# Patient Record
Sex: MALE | Race: WHITE | NOT HISPANIC OR LATINO | Employment: FULL TIME | URBAN - METROPOLITAN AREA
[De-identification: names, ages, dates, MRNs, and addresses within clinical notes are randomized per-mention and may not be internally consistent; named-entity substitution may affect disease eponyms.]

---

## 2019-03-29 ENCOUNTER — MYC MEDICAL ADVICE (OUTPATIENT)
Dept: FAMILY MEDICINE | Facility: CLINIC | Age: 49
End: 2019-03-29

## 2019-04-26 ENCOUNTER — OFFICE VISIT (OUTPATIENT)
Dept: FAMILY MEDICINE | Facility: CLINIC | Age: 49
End: 2019-04-26
Payer: COMMERCIAL

## 2019-04-26 VITALS
BODY MASS INDEX: 34.78 KG/M2 | DIASTOLIC BLOOD PRESSURE: 82 MMHG | WEIGHT: 279.7 LBS | SYSTOLIC BLOOD PRESSURE: 124 MMHG | HEART RATE: 71 BPM | OXYGEN SATURATION: 97 % | HEIGHT: 75 IN | TEMPERATURE: 98.3 F

## 2019-04-26 DIAGNOSIS — K21.9 GASTROESOPHAGEAL REFLUX DISEASE WITHOUT ESOPHAGITIS: ICD-10-CM

## 2019-04-26 DIAGNOSIS — Z12.11 SCREEN FOR COLON CANCER: ICD-10-CM

## 2019-04-26 DIAGNOSIS — M51.26 LUMBAR HERNIATED DISC: ICD-10-CM

## 2019-04-26 DIAGNOSIS — D22.9 ATYPICAL MOLE: ICD-10-CM

## 2019-04-26 DIAGNOSIS — I10 ESSENTIAL HYPERTENSION: ICD-10-CM

## 2019-04-26 DIAGNOSIS — Z11.3 SCREEN FOR STD (SEXUALLY TRANSMITTED DISEASE): ICD-10-CM

## 2019-04-26 DIAGNOSIS — E78.2 MIXED HYPERLIPIDEMIA: ICD-10-CM

## 2019-04-26 DIAGNOSIS — G47.09 OTHER INSOMNIA: ICD-10-CM

## 2019-04-26 DIAGNOSIS — E55.9 VITAMIN D DEFICIENCY: ICD-10-CM

## 2019-04-26 DIAGNOSIS — Z00.00 ENCOUNTER FOR ROUTINE ADULT HEALTH EXAMINATION WITHOUT ABNORMAL FINDINGS: Primary | ICD-10-CM

## 2019-04-26 DIAGNOSIS — I82.90 CLOT: ICD-10-CM

## 2019-04-26 PROBLEM — R73.03 PREDIABETES: Status: ACTIVE | Noted: 2019-04-26

## 2019-04-26 PROBLEM — K80.20 CHOLELITHIASIS: Status: ACTIVE | Noted: 2019-04-26

## 2019-04-26 PROBLEM — K22.4 INEFFECTIVE ESOPHAGEAL MOTILITY: Status: ACTIVE | Noted: 2019-04-26

## 2019-04-26 LAB
ALBUMIN SERPL-MCNC: 4 G/DL (ref 3.4–5)
ALP SERPL-CCNC: 73 U/L (ref 40–150)
ALT SERPL W P-5'-P-CCNC: 73 U/L (ref 0–70)
ANION GAP SERPL CALCULATED.3IONS-SCNC: 7 MMOL/L (ref 3–14)
AST SERPL W P-5'-P-CCNC: 28 U/L (ref 0–45)
BILIRUB SERPL-MCNC: 0.3 MG/DL (ref 0.2–1.3)
BUN SERPL-MCNC: 11 MG/DL (ref 7–30)
CALCIUM SERPL-MCNC: 9.4 MG/DL (ref 8.5–10.1)
CHLORIDE SERPL-SCNC: 107 MMOL/L (ref 94–109)
CHOLEST SERPL-MCNC: 196 MG/DL
CO2 SERPL-SCNC: 26 MMOL/L (ref 20–32)
CREAT SERPL-MCNC: 0.78 MG/DL (ref 0.66–1.25)
DEPRECATED CALCIDIOL+CALCIFEROL SERPL-MC: 22 UG/L (ref 20–75)
GFR SERPL CREATININE-BSD FRML MDRD: >90 ML/MIN/{1.73_M2}
GLUCOSE SERPL-MCNC: 104 MG/DL (ref 70–99)
HBA1C MFR BLD: 6 % (ref 0–5.6)
HCV AB SERPL QL IA: NONREACTIVE
HDLC SERPL-MCNC: 38 MG/DL
HIV 1+2 AB+HIV1 P24 AG SERPL QL IA: NONREACTIVE
LDLC SERPL CALC-MCNC: 128 MG/DL
NONHDLC SERPL-MCNC: 158 MG/DL
POTASSIUM SERPL-SCNC: 4 MMOL/L (ref 3.4–5.3)
PROT SERPL-MCNC: 7.7 G/DL (ref 6.8–8.8)
PSA SERPL-ACNC: 2.48 UG/L (ref 0–4)
SODIUM SERPL-SCNC: 140 MMOL/L (ref 133–144)
T PALLIDUM AB SER QL: NONREACTIVE
TRIGL SERPL-MCNC: 150 MG/DL

## 2019-04-26 PROCEDURE — 80061 LIPID PANEL: CPT | Performed by: FAMILY MEDICINE

## 2019-04-26 PROCEDURE — G0103 PSA SCREENING: HCPCS | Performed by: FAMILY MEDICINE

## 2019-04-26 PROCEDURE — 0064U ANTB TP TOTAL&RPR IA QUAL: CPT | Performed by: FAMILY MEDICINE

## 2019-04-26 PROCEDURE — 86803 HEPATITIS C AB TEST: CPT | Performed by: FAMILY MEDICINE

## 2019-04-26 PROCEDURE — 80053 COMPREHEN METABOLIC PANEL: CPT | Performed by: FAMILY MEDICINE

## 2019-04-26 PROCEDURE — 99396 PREV VISIT EST AGE 40-64: CPT | Performed by: FAMILY MEDICINE

## 2019-04-26 PROCEDURE — 82306 VITAMIN D 25 HYDROXY: CPT | Performed by: FAMILY MEDICINE

## 2019-04-26 PROCEDURE — 36415 COLL VENOUS BLD VENIPUNCTURE: CPT | Performed by: FAMILY MEDICINE

## 2019-04-26 PROCEDURE — 87591 N.GONORRHOEAE DNA AMP PROB: CPT | Performed by: FAMILY MEDICINE

## 2019-04-26 PROCEDURE — 87389 HIV-1 AG W/HIV-1&-2 AB AG IA: CPT | Performed by: FAMILY MEDICINE

## 2019-04-26 PROCEDURE — 87491 CHLMYD TRACH DNA AMP PROBE: CPT | Performed by: FAMILY MEDICINE

## 2019-04-26 PROCEDURE — 83036 HEMOGLOBIN GLYCOSYLATED A1C: CPT | Performed by: FAMILY MEDICINE

## 2019-04-26 RX ORDER — AMLODIPINE BESYLATE 10 MG/1
10 TABLET ORAL DAILY
Qty: 90 TABLET | Refills: 3 | Status: SHIPPED | OUTPATIENT
Start: 2019-04-26 | End: 2020-03-10

## 2019-04-26 RX ORDER — LOSARTAN POTASSIUM 25 MG/1
25 TABLET ORAL DAILY
Qty: 90 TABLET | Refills: 3 | Status: SHIPPED | OUTPATIENT
Start: 2019-04-26 | End: 2020-05-19

## 2019-04-26 RX ORDER — ZOLPIDEM TARTRATE 10 MG/1
10 TABLET ORAL
COMMUNITY
Start: 2018-11-30 | End: 2020-05-27

## 2019-04-26 RX ORDER — CYCLOBENZAPRINE HCL 10 MG
10 TABLET ORAL 3 TIMES DAILY PRN
Qty: 90 TABLET | Refills: 0 | Status: SHIPPED | OUTPATIENT
Start: 2019-04-26 | End: 2019-09-25

## 2019-04-26 RX ORDER — ROSUVASTATIN CALCIUM 20 MG/1
20 TABLET, COATED ORAL DAILY
Qty: 90 TABLET | Refills: 3 | Status: SHIPPED | OUTPATIENT
Start: 2019-04-26 | End: 2020-03-10

## 2019-04-26 RX ORDER — NAPROXEN 500 MG/1
500 TABLET ORAL PRN
COMMUNITY
Start: 2018-06-08 | End: 2020-05-27

## 2019-04-26 RX ORDER — LOSARTAN POTASSIUM 25 MG/1
25 TABLET ORAL
COMMUNITY
Start: 2018-11-25 | End: 2019-04-26

## 2019-04-26 RX ORDER — ASPIRIN 325 MG
TABLET ORAL DAILY
COMMUNITY

## 2019-04-26 RX ORDER — CYCLOBENZAPRINE HCL 10 MG
10 TABLET ORAL
COMMUNITY
Start: 2018-06-08 | End: 2019-04-26

## 2019-04-26 RX ORDER — AMLODIPINE BESYLATE 10 MG/1
10 TABLET ORAL
COMMUNITY
Start: 2018-11-25 | End: 2019-04-26

## 2019-04-26 RX ORDER — PRAVASTATIN SODIUM 40 MG
TABLET ORAL
COMMUNITY
Start: 2018-11-25 | End: 2019-04-26

## 2019-04-26 SDOH — HEALTH STABILITY: MENTAL HEALTH: HOW OFTEN DO YOU HAVE A DRINK CONTAINING ALCOHOL?: 2-3 TIMES A WEEK

## 2019-04-26 ASSESSMENT — MIFFLIN-ST. JEOR: SCORE: 2219.34

## 2019-04-26 NOTE — NURSING NOTE
"Chief Complaint   Patient presents with     Physical     would like to get tested for \"everything\", pt is fasting     /82   Pulse 71   Temp 98.3  F (36.8  C) (Oral)   Ht 1.905 m (6' 3\")   Wt 126.9 kg (279 lb 11.2 oz)   SpO2 97%   BMI 34.96 kg/m   Estimated body mass index is 34.96 kg/m  as calculated from the following:    Height as of this encounter: 1.905 m (6' 3\").    Weight as of this encounter: 126.9 kg (279 lb 11.2 oz).        Health Maintenance due pending provider review:  NONE    n/a    Lashell Branch CMA  "

## 2019-04-26 NOTE — PROGRESS NOTES
SUBJECTIVE:   CC: Sinan Rodriguez is an 48 year old male who presents for preventative health visit.     Healthy Habits:     Getting at least 3 servings of Calcium per day:  NO    Bi-annual eye exam:  Yes    Dental care twice a year:  Yes    Sleep apnea or symptoms of sleep apnea:  None    Diet:  Regular (no restrictions)    Frequency of exercise:  2-3 days/week    Duration of exercise:  Other    Taking medications regularly:  Yes    Medication side effects:  None    PHQ-2 Total Score: 0    Additional concerns today:  No          -------------------------------------    Today's PHQ-2 Score:   PHQ-2 ( 1999 [employer]) 4/19/2019   Q1: Little interest or pleasure in doing things 0   Q2: Feeling down, depressed or hopeless 0   PHQ-2 Score 0   Q1: Little interest or pleasure in doing things Not at all   Q2: Feeling down, depressed or hopeless Not at all   PHQ-2 Score 0       Abuse: Current or Past(Physical, Sexual or Emotional)- NO  Do you feel safe in your environment? YES    Social History     Tobacco Use     Smoking status: Not on file   Substance Use Topics     Alcohol use: Not on file         Alcohol Use 4/19/2019   Prescreen: >3 drinks/day or >7 drinks/week? No   No flowsheet data found.    Last PSA: No results found for: PSA    Reviewed orders with patient. Reviewed health maintenance and updated orders accordingly - Yes  Patient Active Problem List   Diagnosis     Chondromalacia of patella     Knee pain     Low back pain     Essential hypertension     Clot     Mixed hyperlipidemia     Gastroesophageal reflux disease without esophagitis     Other insomnia     Lumbar herniated disc     Past Surgical History:   Procedure Laterality Date     KNEE SURGERY Bilateral     scope multiple - most recent 2014     STRIP VEIN BILATERAL Bilateral 2015       Social History     Tobacco Use     Smoking status: Current Some Day Smoker     Types: Cigars, Dip, chew, snus or snuff     Smokeless tobacco: Current User   Substance Use Topics      Alcohol use: Yes     Frequency: 2-3 times a week     Family History   Problem Relation Age of Onset     Cholecystitis Father 67        passed away - gangrene     Cancer Brother         arm - sarcoma in HS           Reviewed and updated as needed this visit by clinical staff         Reviewed and updated as needed this visit by Provider            Review of Systems  CONSTITUTIONAL: NEGATIVE for fever, chills, change in weight  INTEGUMENTARY/SKIN: NEGATIVE for worrisome rashes, moles or lesions  EYES: NEGATIVE for vision changes or irritation  ENT: NEGATIVE for ear, mouth and throat problems  RESP: NEGATIVE for significant cough or SOB  CV: NEGATIVE for chest pain, palpitations or peripheral edema  GI: NEGATIVE for nausea, abdominal pain, heartburn, or change in bowel habits   male: negative for dysuria, hematuria, decreased urinary stream, erectile dysfunction, urethral discharge  MUSCULOSKELETAL: NEGATIVE for significant arthralgias or myalgia  NEURO: NEGATIVE for weakness, dizziness or paresthesias  PSYCHIATRIC: NEGATIVE for changes in mood or affect    OBJECTIVE:   There were no vitals taken for this visit.    Physical Exam  GENERAL: alert, no distress and over weight  EYES: Eyes grossly normal to inspection, PERRL and conjunctivae and sclerae normal  HENT: ear canals and TM's normal, nose and mouth without ulcers or lesions  NECK: no adenopathy, no asymmetry, masses, or scars and thyroid normal to palpation  RESP: lungs clear to auscultation - no rales, rhonchi or wheezes  CV: regular rate and rhythm, normal S1 S2, no S3 or S4, no murmur, click or rub, no peripheral edema and peripheral pulses strong  ABDOMEN: soft, nontender, no hepatosplenomegaly, no masses and bowel sounds normal  MS: no gross musculoskeletal defects noted, no edema  SKIN: multiple atypical moles  NEURO: Normal strength and tone, mentation intact and speech normal  PSYCH: mentation appears normal, affect normal/bright    Diagnostic Test  Results:  Labs pending    ASSESSMENT/PLAN:   1. Encounter for routine adult health examination without abnormal findings  Routine screening  - PSA, screen    2. Essential hypertension  BP is controlled - refilled meds  - losartan (COZAAR) 25 MG tablet; Take 1 tablet (25 mg) by mouth daily  Dispense: 90 tablet; Refill: 3  - amLODIPine (NORVASC) 10 MG tablet; Take 1 tablet (10 mg) by mouth daily  Dispense: 90 tablet; Refill: 3  - Comprehensive metabolic panel (BMP + Alb, Alk Phos, ALT, AST, Total. Bili, TP)  - Hemoglobin A1c    3. Mixed hyperlipidemia  Will try crestor instead of pravasatin  - rosuvastatin (CRESTOR) 20 MG tablet; Take 1 tablet (20 mg) by mouth daily  Dispense: 90 tablet; Refill: 3  - Comprehensive metabolic panel (BMP + Alb, Alk Phos, ALT, AST, Total. Bili, TP)  - Lipid panel reflex to direct LDL Fasting  - Hemoglobin A1c    4. Clot  On ASA    5. Gastroesophageal reflux disease without esophagitis     - omeprazole (PRILOSEC) 20 MG DR capsule; Take 1 capsule (20 mg) by mouth daily  Dispense: 90 capsule; Refill: 3    6. Other insomnia  Uses ambien -   Thinks 90 tablets will last 1 year   Had Rx filled 6/2018 and another 90 filled 11/2018  Discussed future refills will have him come in to discuss    7. Lumbar herniated disc  Refilled prn back goes out  - cyclobenzaprine (FLEXERIL) 10 MG tablet; Take 1 tablet (10 mg) by mouth 3 times daily as needed for muscle spasms  Dispense: 90 tablet; Refill: 0    8. Screen for STD (sexually transmitted disease)  pending  - HIV Antigen Antibody Combo  - Hepatitis C antibody  - Treponema Abs w Reflex to RPR and Titer  - NEISSERIA GONORRHOEA PCR  - CHLAMYDIA TRACHOMATIS PCR    9. Vitamin D deficiency  pending  - Vitamin D Deficiency    10. Screen for colon cancer  referred  - GASTROENTEROLOGY ADULT REF PROCEDURE ONLY [facility] [phone]; Dr. SAJAN Frankel    11. Atypical mole  referred  - DERMATOLOGY REFERRAL    COUNSELING:   Reviewed preventive health  counseling, as reflected in patient instructions    BP Readings from Last 1 Encounters:   No data found for BP     There is no height or weight on file to calculate BMI.      Weight management plan: Discussed healthy diet and exercise guidelines     has no tobacco history on file.  Tobacco Cessation Action Plan: smoking cigars intermiitent    Counseling Resources:  ATP IV Guidelines  Pooled Cohorts Equation Calculator  FRAX Risk Assessment  ICSI Preventive Guidelines  Dietary Guidelines for Americans, 2010  USDA's MyPlate  ASA Prophylaxis  Lung CA Screening    Jyotsna Mora,   [facility]

## 2019-04-28 LAB
C TRACH DNA SPEC QL NAA+PROBE: NEGATIVE
N GONORRHOEA DNA SPEC QL NAA+PROBE: NEGATIVE
SPECIMEN SOURCE: NORMAL
SPECIMEN SOURCE: NORMAL

## 2019-05-07 NOTE — RESULT ENCOUNTER NOTE
Dear Sinan,   Your test results are all back -   -PSA (prostate specific antigen) test is normal.  This indicates a low likelihood of prostate cancer.  ADVISE: rechecking this in 1 year.  -Cholesterol levels (LDL,HDL, Triglycerides) are borderline.  ADVISE: keep working on healthy diet and exercise - rechecking in 1 year.   -Liver and gallbladder tests (ALT,AST, Alk phos,bilirubin) ALT is just mildly elevated.   Typically we are concerned if the value is 3 times the upper limit of normal so in the 200+ range.   Since it is so minimal we should plan to recheck in 3 months.  -Kidney function (GFR) is normal.  -Sodium is normal.  -Potassium is normal.  -Calcium is normal.  -Glucose is slight elevated and may be a sign of early diabetes (prediabetes). ADVISE:: eating a low carbohydrate diet, exercising, trying to lose weight (if necessary) and rechecking your glucose level in 12 months.  -A1C (diabetic test) is elevated and a sign of prediabetes.  ADVISE: recheck in 12 months  -Hepatitis C antibody screen test shows no signs of a previous hepatitis C infection.  -HIV test is normal.  -Vitamin D level is normal and getting 1000 IU daily in your diet or supplements is recommended.   -Chlamydia and gonnohrea tests are normal.  Let us know if you have any questions.  -Jyotsna Mora, DO

## 2019-06-03 ENCOUNTER — MYC MEDICAL ADVICE (OUTPATIENT)
Dept: FAMILY MEDICINE | Facility: CLINIC | Age: 49
End: 2019-06-03

## 2019-06-21 ENCOUNTER — HOSPITAL ENCOUNTER (OUTPATIENT)
Facility: CLINIC | Age: 49
End: 2019-06-21
Attending: SURGERY | Admitting: SURGERY
Payer: COMMERCIAL

## 2019-07-12 ENCOUNTER — OFFICE VISIT (OUTPATIENT)
Dept: FAMILY MEDICINE | Facility: CLINIC | Age: 49
End: 2019-07-12
Payer: COMMERCIAL

## 2019-07-12 VITALS
WEIGHT: 280.5 LBS | HEIGHT: 75 IN | DIASTOLIC BLOOD PRESSURE: 80 MMHG | TEMPERATURE: 98.8 F | OXYGEN SATURATION: 97 % | BODY MASS INDEX: 34.88 KG/M2 | HEART RATE: 71 BPM | SYSTOLIC BLOOD PRESSURE: 117 MMHG

## 2019-07-12 DIAGNOSIS — I82.90 CLOT: ICD-10-CM

## 2019-07-12 DIAGNOSIS — E78.2 MIXED HYPERLIPIDEMIA: ICD-10-CM

## 2019-07-12 DIAGNOSIS — R73.09 ABNORMAL GLUCOSE: ICD-10-CM

## 2019-07-12 DIAGNOSIS — H26.9 CATARACT, UNSPECIFIED CATARACT TYPE, UNSPECIFIED LATERALITY: ICD-10-CM

## 2019-07-12 DIAGNOSIS — Z01.818 PREOP GENERAL PHYSICAL EXAM: Primary | ICD-10-CM

## 2019-07-12 LAB
ALBUMIN SERPL-MCNC: 4.1 G/DL (ref 3.4–5)
ALP SERPL-CCNC: 72 U/L (ref 40–150)
ALT SERPL W P-5'-P-CCNC: 72 U/L (ref 0–70)
ANION GAP SERPL CALCULATED.3IONS-SCNC: 5 MMOL/L (ref 3–14)
AST SERPL W P-5'-P-CCNC: 28 U/L (ref 0–45)
BILIRUB SERPL-MCNC: 0.4 MG/DL (ref 0.2–1.3)
BUN SERPL-MCNC: 11 MG/DL (ref 7–30)
CALCIUM SERPL-MCNC: 9.1 MG/DL (ref 8.5–10.1)
CHLORIDE SERPL-SCNC: 108 MMOL/L (ref 94–109)
CHOLEST SERPL-MCNC: 155 MG/DL
CK SERPL-CCNC: 264 U/L (ref 30–300)
CO2 SERPL-SCNC: 28 MMOL/L (ref 20–32)
CREAT SERPL-MCNC: 0.88 MG/DL (ref 0.66–1.25)
GFR SERPL CREATININE-BSD FRML MDRD: >90 ML/MIN/{1.73_M2}
GLUCOSE SERPL-MCNC: 94 MG/DL (ref 70–99)
HBA1C MFR BLD: 5.9 % (ref 0–5.6)
HDLC SERPL-MCNC: 39 MG/DL
LDLC SERPL CALC-MCNC: 80 MG/DL
NONHDLC SERPL-MCNC: 116 MG/DL
POTASSIUM SERPL-SCNC: 4.1 MMOL/L (ref 3.4–5.3)
PROT SERPL-MCNC: 7.3 G/DL (ref 6.8–8.8)
SODIUM SERPL-SCNC: 141 MMOL/L (ref 133–144)
TRIGL SERPL-MCNC: 180 MG/DL

## 2019-07-12 PROCEDURE — 83036 HEMOGLOBIN GLYCOSYLATED A1C: CPT | Performed by: FAMILY MEDICINE

## 2019-07-12 PROCEDURE — 80053 COMPREHEN METABOLIC PANEL: CPT | Performed by: FAMILY MEDICINE

## 2019-07-12 PROCEDURE — 99214 OFFICE O/P EST MOD 30 MIN: CPT | Performed by: FAMILY MEDICINE

## 2019-07-12 PROCEDURE — 82550 ASSAY OF CK (CPK): CPT | Performed by: FAMILY MEDICINE

## 2019-07-12 PROCEDURE — 36415 COLL VENOUS BLD VENIPUNCTURE: CPT | Performed by: FAMILY MEDICINE

## 2019-07-12 PROCEDURE — 80061 LIPID PANEL: CPT | Performed by: FAMILY MEDICINE

## 2019-07-12 ASSESSMENT — MIFFLIN-ST. JEOR: SCORE: 2222.97

## 2019-07-12 NOTE — NURSING NOTE
"Chief Complaint   Patient presents with     Pre-Op Exam     /80   Pulse 71   Temp 98.8  F (37.1  C) (Oral)   Ht 1.905 m (6' 3\")   Wt 127.2 kg (280 lb 8 oz)   SpO2 97%   BMI 35.06 kg/m   Estimated body mass index is 35.06 kg/m  as calculated from the following:    Height as of this encounter: 1.905 m (6' 3\").    Weight as of this encounter: 127.2 kg (280 lb 8 oz).        Health Maintenance due pending provider review:  NONE    n/a    Lashell Branch CMA  "

## 2019-07-12 NOTE — RESULT ENCOUNTER NOTE
Dear Sinan,   Your test results are all back -   -Cholesterol levels (LDL,HDL, Triglycerides) are improved on the Crestor - if you are tolerating, continue the same dose.  ADVISE: rechecking in 1 year.   -Liver and gallbladder tests (ALT,AST, Alk phos,bilirubin) ALT is still borderline and unchanged.  Plan to recheck in one year.  -Kidney function (GFR) is normal.  -Sodium is normal.  -Potassium is normal.  -Calcium is normal.  -Glucose (diabetic screening test) is normal.  -A1C (diabetic test) is borderline (but better) and indicates that your blood sugar has been in a borderline range the last 3 months.    Let us know if you have any questions.  -Jyotsna Mora, DO

## 2019-07-12 NOTE — PROGRESS NOTES
[facility]  [address] [place]  [facility] [address] [phone]  Dept: [phone]    PRE-OP EVALUATION:  Today's date: 2019    Sinan Rodriguez (: 1970) presents for pre-operative evaluation assessment as requested by Dr. Yohan Pierson.  He requires evaluation and anesthesia risk assessment prior to undergoing surgery/procedure for treatment of cataract .    Fax number for surgical facility: [phone]  Primary Physician: Jyotsna Mora  Type of Anesthesia Anticipated: to be determined    Patient has a Health Care Directive or Living Will:  NO    Preop Questions 2019   Who is doing your surgery? Yohan Pierson   What are you having done? Cataract Surgery, both eyes.   Date of Surgery/Procedure: 2019 and 2019   Facility or Hospital where procedure/surgery will be performed: [facility]   1.  Do you have a history of Heart attack, stroke, stent, coronary bypass surgery, or other heart surgery? No   2.  Do you ever have any pain or discomfort in your chest? No   3.  Do you have a history of  Heart Failure? No   4.   Are you troubled by shortness of breath when:  walking on a level surface, or up a slight hill, or at night? No   5.  Do you currently have a cold, bronchitis or other respiratory infection? No   6.  Do you have a cough, shortness of breath, or wheezing? No   7.  Do you sometimes get pains in the calves of your legs when you walk? No   8. Do you or anyone in your family have previous history of blood clots? YES - personal hx - superficial but took lovenox and now is on ASA   9.  Do you or does anyone in your family have a serious bleeding problem such as prolonged bleeding following surgeries or cuts? No   10. Have you ever had problems with anemia or been told to take iron pills? No   11. Have you had any abnormal blood loss such as black, tarry or bloody stools? No   12. Have you ever had a blood transfusion? No   13. Have you  or any of your relatives ever had problems with anesthesia? No   14. Do you have sleep apnea, excessive snoring or daytime drowsiness? No   15. Do you have any prosthetic heart valves? No   16. Do you have prosthetic joints? No         HPI:     HPI related to upcoming procedure: Bilateral cataracts dx 2019 -         See problem list for active medical problems.  Problems all longstanding and stable, except as noted/documented.  See ROS for pertinent symptoms related to these conditions.      MEDICAL HISTORY:     Patient Active Problem List    Diagnosis Date Noted     Essential hypertension 04/26/2019     Priority: Medium     Diagnosed late 40's  ? Other med       Clot 04/26/2019     Priority: Medium     Superficial - diagnosed in 10/2015  Took lovenox for a while  Now on ASA 325mg daily       Mixed hyperlipidemia 04/26/2019     Priority: Medium     Took atorvastatin but had myalgia and mildly elevated CK       Gastroesophageal reflux disease without esophagitis 04/26/2019     Priority: Medium     Went to [facility] -   Globus sensation - had series of tests  Omeprazole 20mg daily       Other insomnia 04/26/2019     Priority: Medium     Uses prn travel   Qty 90 tablets will last one year       Lumbar herniated disc 04/26/2019     Priority: Medium     Uses prn flexeril       Ineffective esophageal motility 04/26/2019     Priority: Medium     Prediabetes 04/26/2019     Priority: Medium     Cholelithiasis 04/26/2019     Priority: Medium     asymptomatic, see u/s 10/16       Low back pain 04/08/2015     Priority: Medium     Diagnosis updated by automated process. Provider to review and confirm.       Chondromalacia of patella 04/03/2015     Priority: Medium     Knee pain 04/03/2015     Priority: Medium     Varicose veins of other specified sites 08/19/2014     Priority: Medium     Obesity, unspecified 12/01/2006     Priority: Medium      No past medical history on file.  Past Surgical History:   Procedure Laterality Date      KNEE SURGERY Bilateral     scope multiple - most recent 2014     STRIP VEIN BILATERAL Bilateral 2015     Current Outpatient Medications   Medication Sig Dispense Refill     amLODIPine (NORVASC) 10 MG tablet Take 1 tablet (10 mg) by mouth daily 90 tablet 3     aspirin (ASPIRIN ADULT) 325 MG tablet daily        cyclobenzaprine (FLEXERIL) 10 MG tablet Take 1 tablet (10 mg) by mouth 3 times daily as needed for muscle spasms 90 tablet 0     losartan (COZAAR) 25 MG tablet Take 1 tablet (25 mg) by mouth daily 90 tablet 3     omeprazole (PRILOSEC) 20 MG DR capsule Take 1 capsule (20 mg) by mouth daily 90 capsule 3     rosuvastatin (CRESTOR) 20 MG tablet Take 1 tablet (20 mg) by mouth daily 90 tablet 3     naproxen (NAPROSYN) 500 MG tablet Take 500 mg by mouth as needed        zolpidem (AMBIEN) 10 MG tablet Take 10 mg by mouth       OTC products: Aspirin    Allergies   Allergen Reactions     Atorvastatin      Mildly elevated CK     Erythromycin      No reaction to eye drops recently     Iodine      injectable     Phenobarbital       Latex Allergy: NO    Social History     Tobacco Use     Smoking status: Current Some Day Smoker     Types: Cigars, Dip, chew, snus or snuff     Smokeless tobacco: Current User   Substance Use Topics     Alcohol use: Yes     Frequency: 2-3 times a week     History   Drug Use Unknown       REVIEW OF SYSTEMS:   CONSTITUTIONAL: NEGATIVE for fever, chills, change in weight  INTEGUMENTARY/SKIN: NEGATIVE for worrisome rashes, moles or lesions  EYES: NEGATIVE for vision changes or irritation  ENT/MOUTH: NEGATIVE for ear, mouth and throat problems  RESP: NEGATIVE for significant cough or SOB  BREAST: NEGATIVE for masses, tenderness or discharge  CV: NEGATIVE for chest pain, palpitations or peripheral edema  GI: NEGATIVE for nausea, abdominal pain, heartburn, or change in bowel habits  : NEGATIVE for frequency, dysuria, or hematuria  MUSCULOSKELETAL: NEGATIVE for significant arthralgias or  "myalgia  NEURO: NEGATIVE for weakness, dizziness or paresthesias  ENDOCRINE: NEGATIVE for temperature intolerance, skin/hair changes  HEME: NEGATIVE for bleeding problems  PSYCHIATRIC: NEGATIVE for changes in mood or affect    EXAM:   /80   Pulse 71   Temp 98.8  F (37.1  C) (Oral)   Ht 1.905 m (6' 3\")   Wt 127.2 kg (280 lb 8 oz)   SpO2 97%   BMI 35.06 kg/m      GENERAL APPEARANCE: healthy, alert and no distress     EYES: EOMI,  PERRL     HENT: ear canals and TM's normal and nose and mouth without ulcers or lesions     NECK: no adenopathy, no asymmetry, masses, or scars and thyroid normal to palpation     RESP: lungs clear to auscultation - no rales, rhonchi or wheezes     CV: regular rates and rhythm, normal S1 S2, no S3 or S4 and no murmur, click or rub     ABDOMEN:  soft, nontender, no HSM or masses and bowel sounds normal     MS: extremities normal- no gross deformities noted, no evidence of inflammation in joints, FROM in all extremities.     SKIN: no suspicious lesions or rashes     NEURO: Normal strength and tone, sensory exam grossly normal, mentation intact and speech normal     PSYCH: mentation appears normal. and affect normal/bright     LYMPHATICS: No cervical adenopathy    DIAGNOSTICS:   No labs or EKG required for low risk surgery (cataract, skin procedure, breast biopsy, etc)    Recent Labs   Lab Test 04/26/19  0858      POTASSIUM 4.0   CR 0.78   A1C 6.0*    had labs drawn but for follow-up on other issues not related to surgery     IMPRESSION:   Reason for surgery/procedure: 47 y/o male here for preoperative evaluation for cataract surgery     The proposed surgical procedure is considered LOW risk.    REVISED CARDIAC RISK INDEX  The patient has the following serious cardiovascular risks for perioperative complications such as (MI, PE, VFib and 3  AV Block):  No serious cardiac risks  INTERPRETATION: 0 risks: Class I (very low risk - 0.4% complication rate)    The patient has the " following additional risks for perioperative complications:  No identified additional risks      ICD-10-CM    1. Preop general physical exam Z01.818    2. Cataract, unspecified cataract type, unspecified laterality H26.9    3. Clot I82.90    4. Mixed hyperlipidemia E78.2 Lipid panel reflex to direct LDL Fasting     CK total   5. Abnormal glucose R73.09 Comprehensive metabolic panel (BMP + Alb, Alk Phos, ALT, AST, Total. Bili, TP)     Hemoglobin A1c       RECOMMENDATIONS:         --Patient is to take all scheduled medications on the day of surgery EXCEPT for modifications listed below.  Hold PRN meds - flexeril and naproxen     APPROVAL GIVEN to proceed with proposed procedure, without further diagnostic evaluation       Signed Electronically by: Jyotsna Mora,     Copy of this evaluation report is provided to requesting physician.    August Preop Guidelines    Revised Cardiac Risk Index

## 2019-08-19 ENCOUNTER — TRANSFERRED RECORDS (OUTPATIENT)
Dept: HEALTH INFORMATION MANAGEMENT | Facility: CLINIC | Age: 49
End: 2019-08-19

## 2019-08-23 ENCOUNTER — HOSPITAL ENCOUNTER (OUTPATIENT)
Facility: CLINIC | Age: 49
Discharge: HOME | End: 2019-08-23
Attending: SURGERY | Admitting: SURGERY
Payer: COMMERCIAL

## 2019-08-23 VITALS
DIASTOLIC BLOOD PRESSURE: 91 MMHG | SYSTOLIC BLOOD PRESSURE: 134 MMHG | OXYGEN SATURATION: 98 % | RESPIRATION RATE: 16 BRPM | WEIGHT: 280 LBS | HEIGHT: 75 IN | BODY MASS INDEX: 34.82 KG/M2

## 2019-08-23 LAB
COLONOSCOPY: NORMAL
GI HISTORY AND PHYSICALL: NORMAL

## 2019-08-23 PROCEDURE — 45378 DIAGNOSTIC COLONOSCOPY: CPT | Mod: PT | Performed by: SURGERY

## 2019-08-23 PROCEDURE — 25000128 H RX IP 250 OP 636

## 2019-08-23 PROCEDURE — G0500 MOD SEDAT ENDO SERVICE >5YRS: HCPCS

## 2019-08-23 RX ORDER — ONDANSETRON 2 MG/ML
4 INJECTION INTRAMUSCULAR; INTRAVENOUS EVERY 6 HOURS PRN
Status: CANCELLED | OUTPATIENT
Start: 2019-08-23

## 2019-08-23 RX ORDER — NALOXONE HYDROCHLORIDE 0.4 MG/ML
.1-.4 INJECTION, SOLUTION INTRAMUSCULAR; INTRAVENOUS; SUBCUTANEOUS
Status: CANCELLED | OUTPATIENT
Start: 2019-08-23 | End: 2019-08-24

## 2019-08-23 RX ORDER — ONDANSETRON 4 MG/1
4 TABLET, ORALLY DISINTEGRATING ORAL EVERY 6 HOURS PRN
Status: CANCELLED | OUTPATIENT
Start: 2019-08-23

## 2019-08-23 RX ORDER — FLUMAZENIL 0.1 MG/ML
0.2 INJECTION, SOLUTION INTRAVENOUS
Status: CANCELLED | OUTPATIENT
Start: 2019-08-23 | End: 2019-08-23

## 2019-08-23 RX ORDER — LIDOCAINE 40 MG/G
CREAM TOPICAL
Status: DISCONTINUED | OUTPATIENT
Start: 2019-08-23 | End: 2019-08-23 | Stop reason: HOSPADM

## 2019-08-23 RX ORDER — ONDANSETRON 2 MG/ML
4 INJECTION INTRAMUSCULAR; INTRAVENOUS
Status: DISCONTINUED | OUTPATIENT
Start: 2019-08-23 | End: 2019-08-23 | Stop reason: HOSPADM

## 2019-08-23 ASSESSMENT — MIFFLIN-ST. JEOR: SCORE: 2220.7

## 2019-09-10 ENCOUNTER — OFFICE VISIT (OUTPATIENT)
Dept: FAMILY MEDICINE | Facility: CLINIC | Age: 49
End: 2019-09-10
Payer: COMMERCIAL

## 2019-09-10 VITALS
RESPIRATION RATE: 14 BRPM | SYSTOLIC BLOOD PRESSURE: 118 MMHG | HEART RATE: 69 BPM | DIASTOLIC BLOOD PRESSURE: 81 MMHG | WEIGHT: 278.5 LBS | HEIGHT: 75 IN | OXYGEN SATURATION: 97 % | BODY MASS INDEX: 34.63 KG/M2

## 2019-09-10 DIAGNOSIS — I10 ESSENTIAL HYPERTENSION: ICD-10-CM

## 2019-09-10 DIAGNOSIS — H26.9 CATARACT OF BOTH EYES, UNSPECIFIED CATARACT TYPE: ICD-10-CM

## 2019-09-10 DIAGNOSIS — Z01.818 PREOP GENERAL PHYSICAL EXAM: Primary | ICD-10-CM

## 2019-09-10 DIAGNOSIS — E78.2 MIXED HYPERLIPIDEMIA: ICD-10-CM

## 2019-09-10 PROCEDURE — 99214 OFFICE O/P EST MOD 30 MIN: CPT | Performed by: PHYSICIAN ASSISTANT

## 2019-09-10 ASSESSMENT — PAIN SCALES - GENERAL: PAINLEVEL: NO PAIN (0)

## 2019-09-10 ASSESSMENT — MIFFLIN-ST. JEOR: SCORE: 2213.9

## 2019-09-10 NOTE — PROGRESS NOTES
[facility]  [address]  [facility] [address] [phone]  Dept: [phone]    PRE-OP EVALUATION:  Today's date: 9/10/2019    Sinan oRdriguez (: 1970) presents for pre-operative evaluation assessment as requested by Dr. Pierson.  He requires evaluation and anesthesia risk assessment prior to undergoing surgery/procedure for treatment of cataract surgery .    Proposed Surgery/ Procedure: Cataract surgery  Date of Surgery/ Procedure: 2019  Time of Surgery/ Procedure: [facility]  Hospital/Surgical Facility: [facility]  Fax number for surgical facility: [phone]  Primary Physician: Jyotsna Mora  Type of Anesthesia Anticipated: to be determined    Patient has a Health Care Directive or Living Will:  NO    1. NO - Do you have a history of heart attack, stroke, stent, bypass or surgery on an artery in the head, neck, heart or legs?  2. NO - Do you ever have any pain or discomfort in your chest?  3. NO - Do you have a history of  Heart Failure?  4. NO - Are you troubled by shortness of breath when: walking on the level, up a slight hill or at night?  5. NO - Do you currently have a cold, bronchitis or other respiratory infection?  6. NO - Do you have a cough, shortness of breath or wheezing?  7. NO - Do you sometimes get pains in the calves of your legs when you walk?  8. YES - DO YOU OR ANYONE IN YOUR FAMILY HAVE PREVIOUS HISTORY OF BLOOD CLOTS? Yes- Personal hx-superficial but took lovenox and now is on ASA  9. NO - Do you or does anyone in your family have a serious bleeding problem such as prolonged bleeding following surgeries or cuts?  10. NO - Have you ever had problems with anemia or been told to take iron pills?  11. NO - Have you had any abnormal blood loss such as black, tarry or bloody stools, or abnormal vaginal bleeding?  12. NO - Have you ever had a blood transfusion?  13. NO - Have you or any of your relatives ever had problems with  anesthesia?  14. NO - Do you have sleep apnea, excessive snoring or daytime drowsiness?  15. NO - Do you have any prosthetic heart valves?  16. NO - Do you have prosthetic joints?  17. NO - Is there any chance that you may be pregnant?      HPI:     HPI related to upcoming procedure: 49 y/o male here for pre-op exam.  Hx of cataracts.  Was going to get done this summer, but found out that new trifocal lens was going to get approved so he has waited.  It has now been approved, and is planning on getting done.      HYPERLIPIDEMIA - Patient has a long history of significant Hyperlipidemia requiring medication for treatment with recent good control. Patient reports no problems or side effects with the medication.     HYPERTENSION - Patient has longstanding history of HTN , currently denies any symptoms referable to elevated blood pressure. Specifically denies chest pain, palpitations, dyspnea, orthopnea, PND or peripheral edema. Blood pressure readings have been in normal range. Current medication regimen is as listed below. Patient denies any side effects of medication.       MEDICAL HISTORY:     Patient Active Problem List    Diagnosis Date Noted     Essential hypertension 04/26/2019     Priority: Medium     Diagnosed late 40's  ? Other med       Clot 04/26/2019     Priority: Medium     Superficial - diagnosed in 10/2015  Took lovenox for a while  Now on ASA 325mg daily       Mixed hyperlipidemia 04/26/2019     Priority: Medium     Took atorvastatin but had myalgia and mildly elevated CK       Gastroesophageal reflux disease without esophagitis 04/26/2019     Priority: Medium     Went to [facility] -   Globus sensation - had series of tests  Omeprazole 20mg daily       Other insomnia 04/26/2019     Priority: Medium     Uses prn travel   Qty 90 tablets will last one year       Lumbar herniated disc 04/26/2019     Priority: Medium     Uses prn flexeril       Ineffective esophageal motility 04/26/2019     Priority: Medium      Prediabetes 04/26/2019     Priority: Medium     Cholelithiasis 04/26/2019     Priority: Medium     asymptomatic, see u/s 10/16       Low back pain 04/08/2015     Priority: Medium     Diagnosis updated by automated process. Provider to review and confirm.       Chondromalacia of patella 04/03/2015     Priority: Medium     Knee pain 04/03/2015     Priority: Medium     Varicose veins of other specified sites 08/19/2014     Priority: Medium     Obesity, unspecified 12/01/2006     Priority: Medium      Past Medical History:   Diagnosis Date     Hypertension      Past Surgical History:   Procedure Laterality Date     COLONOSCOPY N/A 8/23/2019    Procedure: COLONOSCOPY;  Surgeon: Silvano Segovia MD;  Location:  GI     KNEE SURGERY Bilateral     scope multiple - most recent 2014     ORTHOPEDIC SURGERY       STRIP VEIN BILATERAL Bilateral 2015     Current Outpatient Medications   Medication Sig Dispense Refill     amLODIPine (NORVASC) 10 MG tablet Take 1 tablet (10 mg) by mouth daily 90 tablet 3     aspirin (ASPIRIN ADULT) 325 MG tablet daily        cyclobenzaprine (FLEXERIL) 10 MG tablet Take 1 tablet (10 mg) by mouth 3 times daily as needed for muscle spasms 90 tablet 0     losartan (COZAAR) 25 MG tablet Take 1 tablet (25 mg) by mouth daily 90 tablet 3     naproxen (NAPROSYN) 500 MG tablet Take 500 mg by mouth as needed        omeprazole (PRILOSEC) 20 MG DR capsule Take 1 capsule (20 mg) by mouth daily 90 capsule 3     rosuvastatin (CRESTOR) 20 MG tablet Take 1 tablet (20 mg) by mouth daily 90 tablet 3     zolpidem (AMBIEN) 10 MG tablet Take 10 mg by mouth       OTC products: None, except as noted above    Allergies   Allergen Reactions     Atorvastatin      Mildly elevated CK     Erythromycin      No reaction to eye drops recently     Iodine      injectable     Phenobarbital       Latex Allergy: NO    Social History     Tobacco Use     Smoking status: Current Some Day Smoker     Types: Cigars, Dip, chew, snus or  "snuff     Smokeless tobacco: Current User   Substance Use Topics     Alcohol use: Yes     Frequency: 2-3 times a week     History   Drug Use Unknown       REVIEW OF SYSTEMS:   CONSTITUTIONAL: NEGATIVE for fever, chills, change in weight  ENT/MOUTH: NEGATIVE for ear, mouth and throat problems  RESP: NEGATIVE for significant cough or SOB  CV: NEGATIVE for chest pain, palpitations or peripheral edema    EXAM:   /81 (BP Location: Left arm, Patient Position: Sitting, Cuff Size: Adult Large)   Pulse 69   Resp 14   Ht 1.905 m (6' 3\")   Wt 126.3 kg (278 lb 8 oz)   SpO2 97%   BMI 34.81 kg/m    GENERAL APPEARANCE: alert and no distress  HENT: ear canals and TM's normal and nose and mouth without ulcers or lesions  RESP: lungs clear to auscultation - no rales, rhonchi or wheezes  CV: regular rate and rhythm, normal S1 S2, no S3 or S4 and no murmur, click or rub   NEURO: Normal strength and tone, sensory exam grossly normal, mentation intact and speech normal    DIAGNOSTICS:   No labs or EKG required for low risk surgery (cataract, skin procedure, breast biopsy, etc)    Recent Labs   Lab Test 07/12/19  0929 04/26/19  0858    140   POTASSIUM 4.1 4.0   CR 0.88 0.78   A1C 5.9* 6.0*        IMPRESSION:   Reason for surgery/procedure: cataract  Diagnosis/reason for consult: as above    The proposed surgical procedure is considered LOW risk.    REVISED CARDIAC RISK INDEX  The patient has the following serious cardiovascular risks for perioperative complications such as (MI, PE, VFib and 3  AV Block):  No serious cardiac risks  INTERPRETATION: 0 risks: Class I (very low risk - 0.4% complication rate)    The patient has the following additional risks for perioperative complications:  No identified additional risks      ICD-10-CM    1. Preop general physical exam Z01.818    2. Cataract of both eyes, unspecified cataract type H26.9    3. Mixed hyperlipidemia E78.2    4. Essential hypertension I10        RECOMMENDATIONS: "       Cardiovascular Risk  Performs 4 METs exercise without symptoms (Walk on level ground at 15 minutes per mile (4 miles/hour)) .       --Patient is to take all scheduled medications on the day of surgery EXCEPT for modifications listed below.  Naproxen and flexeril.    APPROVAL GIVEN to proceed with proposed procedure, without further diagnostic evaluation       Signed Electronically by: Luis Castro PA-C    Copy of this evaluation report is provided to requesting physician.    [facility] Preop Guidelines    Revised Cardiac Risk Index

## 2019-09-25 DIAGNOSIS — M51.26 LUMBAR HERNIATED DISC: ICD-10-CM

## 2019-09-25 RX ORDER — CYCLOBENZAPRINE HCL 10 MG
TABLET ORAL
Qty: 90 TABLET | Refills: 0 | Status: SHIPPED | OUTPATIENT
Start: 2019-09-25

## 2019-09-25 NOTE — TELEPHONE ENCOUNTER
Last Written Prescription Date:  4/26/2019  Last Fill Quantity: 90,  # refills: 0   Last office visit: 9/10/2019 with prescribing provider:  Abby   Future Office Visit:      Requested Prescriptions   Pending Prescriptions Disp Refills     cyclobenzaprine (FLEXERIL) 10 MG tablet [Pharmacy Med Name: CYCLOBENZAPRINE HCL TABS 10MG] 90 tablet 12     Sig: TAKE 1 TABLET THREE TIMES A DAY AS NEEDED FOR MUSCLE SPASMS       There is no refill protocol information for this order

## 2019-09-25 NOTE — TELEPHONE ENCOUNTER
Routing refill request to provider for review/approval because:  Drug not on the [facility] refill protocol   Sujey DE LA CRUZ RN

## 2019-11-04 ENCOUNTER — HEALTH MAINTENANCE LETTER (OUTPATIENT)
Age: 49
End: 2019-11-04

## 2020-02-05 ENCOUNTER — OFFICE VISIT (OUTPATIENT)
Dept: URGENT CARE | Facility: URGENT CARE | Age: 50
End: 2020-02-05
Payer: COMMERCIAL

## 2020-02-05 ENCOUNTER — APPOINTMENT (OUTPATIENT)
Dept: CT IMAGING | Facility: CLINIC | Age: 50
End: 2020-02-05
Attending: EMERGENCY MEDICINE
Payer: COMMERCIAL

## 2020-02-05 ENCOUNTER — HOSPITAL ENCOUNTER (EMERGENCY)
Facility: CLINIC | Age: 50
Discharge: HOME | End: 2020-02-05
Attending: EMERGENCY MEDICINE | Admitting: EMERGENCY MEDICINE
Payer: COMMERCIAL

## 2020-02-05 VITALS
DIASTOLIC BLOOD PRESSURE: 118 MMHG | BODY MASS INDEX: 34.54 KG/M2 | RESPIRATION RATE: 18 BRPM | HEIGHT: 75 IN | OXYGEN SATURATION: 98 % | SYSTOLIC BLOOD PRESSURE: 131 MMHG | WEIGHT: 277.8 LBS | TEMPERATURE: 98.1 F | HEART RATE: 87 BPM

## 2020-02-05 VITALS
DIASTOLIC BLOOD PRESSURE: 74 MMHG | SYSTOLIC BLOOD PRESSURE: 121 MMHG | BODY MASS INDEX: 34.62 KG/M2 | WEIGHT: 277 LBS | OXYGEN SATURATION: 96 % | HEART RATE: 107 BPM | TEMPERATURE: 100.1 F

## 2020-02-05 DIAGNOSIS — C64.1 RENAL CELL CARCINOMA OF RIGHT KIDNEY METASTATIC TO OTHER SITE (H): ICD-10-CM

## 2020-02-05 DIAGNOSIS — R14.0 ABDOMINAL BLOATING: Primary | ICD-10-CM

## 2020-02-05 DIAGNOSIS — R31.9 HEMATURIA, UNSPECIFIED TYPE: ICD-10-CM

## 2020-02-05 DIAGNOSIS — D72.819 LEUKOPENIA, UNSPECIFIED TYPE: ICD-10-CM

## 2020-02-05 LAB
ALBUMIN SERPL-MCNC: 3.9 G/DL (ref 3.4–5)
ALBUMIN UR-MCNC: 30 MG/DL
ALP SERPL-CCNC: 86 U/L (ref 40–150)
ALT SERPL W P-5'-P-CCNC: 41 U/L (ref 0–70)
ANION GAP SERPL CALCULATED.3IONS-SCNC: 6 MMOL/L (ref 3–14)
APPEARANCE UR: CLEAR
AST SERPL W P-5'-P-CCNC: 14 U/L (ref 0–45)
BASOPHILS # BLD AUTO: 0 10E9/L (ref 0–0.2)
BASOPHILS NFR BLD AUTO: 0.3 %
BILIRUB SERPL-MCNC: 0.5 MG/DL (ref 0.2–1.3)
BILIRUB UR QL STRIP: ABNORMAL
BUN SERPL-MCNC: 7 MG/DL (ref 7–30)
CALCIUM SERPL-MCNC: 9.2 MG/DL (ref 8.5–10.1)
CHLORIDE SERPL-SCNC: 105 MMOL/L (ref 94–109)
CO2 SERPL-SCNC: 25 MMOL/L (ref 20–32)
COLOR UR AUTO: YELLOW
CREAT SERPL-MCNC: 0.68 MG/DL (ref 0.66–1.25)
D DIMER PPP FEU-MCNC: 7 UG/ML FEU (ref 0–0.5)
DIFFERENTIAL METHOD BLD: ABNORMAL
EOSINOPHIL # BLD AUTO: 0.3 10E9/L (ref 0–0.7)
EOSINOPHIL NFR BLD AUTO: 2.1 %
ERYTHROCYTE [DISTWIDTH] IN BLOOD BY AUTOMATED COUNT: 14.5 % (ref 10–15)
GFR SERPL CREATININE-BSD FRML MDRD: >90 ML/MIN/{1.73_M2}
GLUCOSE SERPL-MCNC: 110 MG/DL (ref 70–99)
GLUCOSE UR STRIP-MCNC: NEGATIVE MG/DL
HCT VFR BLD AUTO: 42 % (ref 40–53)
HGB BLD-MCNC: 13.9 G/DL (ref 13.3–17.7)
HGB UR QL STRIP: ABNORMAL
KETONES UR STRIP-MCNC: >=80 MG/DL
LEUKOCYTE ESTERASE UR QL STRIP: NEGATIVE
LIPASE SERPL-CCNC: 676 U/L (ref 73–393)
LYMPHOCYTES # BLD AUTO: 1.3 10E9/L (ref 0.8–5.3)
LYMPHOCYTES NFR BLD AUTO: 8.6 %
MCH RBC QN AUTO: 27.6 PG (ref 26.5–33)
MCHC RBC AUTO-ENTMCNC: 33.1 G/DL (ref 31.5–36.5)
MCV RBC AUTO: 83 FL (ref 78–100)
MONOCYTES # BLD AUTO: 0.9 10E9/L (ref 0–1.3)
MONOCYTES NFR BLD AUTO: 6.2 %
MUCOUS THREADS #/AREA URNS LPF: PRESENT /LPF
NEUTROPHILS # BLD AUTO: 12.3 10E9/L (ref 1.6–8.3)
NEUTROPHILS NFR BLD AUTO: 82.8 %
NITRATE UR QL: NEGATIVE
NON-SQ EPI CELLS #/AREA URNS LPF: ABNORMAL /LPF
PH UR STRIP: 7 PH (ref 5–7)
PLATELET # BLD AUTO: 272 10E9/L (ref 150–450)
POTASSIUM SERPL-SCNC: 3.5 MMOL/L (ref 3.4–5.3)
PROT SERPL-MCNC: 7.6 G/DL (ref 6.8–8.8)
RBC # BLD AUTO: 5.04 10E12/L (ref 4.4–5.9)
RBC #/AREA URNS AUTO: ABNORMAL /HPF
SODIUM SERPL-SCNC: 136 MMOL/L (ref 133–144)
SOURCE: ABNORMAL
SP GR UR STRIP: 1.02 (ref 1–1.03)
UROBILINOGEN UR STRIP-ACNC: 0.2 EU/DL (ref 0.2–1)
WBC # BLD AUTO: 14.9 10E9/L (ref 4–11)
WBC #/AREA URNS AUTO: ABNORMAL /HPF

## 2020-02-05 PROCEDURE — 99213 OFFICE O/P EST LOW 20 MIN: CPT | Performed by: PHYSICIAN ASSISTANT

## 2020-02-05 PROCEDURE — 83690 ASSAY OF LIPASE: CPT | Performed by: EMERGENCY MEDICINE

## 2020-02-05 PROCEDURE — 80053 COMPREHEN METABOLIC PANEL: CPT | Performed by: EMERGENCY MEDICINE

## 2020-02-05 PROCEDURE — 81001 URINALYSIS AUTO W/SCOPE: CPT | Performed by: PHYSICIAN ASSISTANT

## 2020-02-05 PROCEDURE — 25000125 ZZHC RX 250: Performed by: EMERGENCY MEDICINE

## 2020-02-05 PROCEDURE — 85025 COMPLETE CBC W/AUTO DIFF WBC: CPT | Performed by: PHYSICIAN ASSISTANT

## 2020-02-05 PROCEDURE — 36415 COLL VENOUS BLD VENIPUNCTURE: CPT | Performed by: PHYSICIAN ASSISTANT

## 2020-02-05 PROCEDURE — 25000128 H RX IP 250 OP 636: Performed by: EMERGENCY MEDICINE

## 2020-02-05 PROCEDURE — 74177 CT ABD & PELVIS W/CONTRAST: CPT

## 2020-02-05 PROCEDURE — 99285 EMERGENCY DEPT VISIT HI MDM: CPT | Mod: 25

## 2020-02-05 PROCEDURE — 96374 THER/PROPH/DIAG INJ IV PUSH: CPT | Mod: 59

## 2020-02-05 PROCEDURE — 85379 FIBRIN DEGRADATION QUANT: CPT | Performed by: EMERGENCY MEDICINE

## 2020-02-05 RX ORDER — IOPAMIDOL 755 MG/ML
135 INJECTION, SOLUTION INTRAVASCULAR ONCE
Status: COMPLETED | OUTPATIENT
Start: 2020-02-05 | End: 2020-02-05

## 2020-02-05 RX ORDER — ONDANSETRON 2 MG/ML
4 INJECTION INTRAMUSCULAR; INTRAVENOUS ONCE
Status: COMPLETED | OUTPATIENT
Start: 2020-02-05 | End: 2020-02-05

## 2020-02-05 RX ADMIN — IOPAMIDOL 135 ML: 755 INJECTION, SOLUTION INTRAVENOUS at 21:39

## 2020-02-05 RX ADMIN — SODIUM CHLORIDE 80 ML: 9 INJECTION, SOLUTION INTRAVENOUS at 21:39

## 2020-02-05 RX ADMIN — ONDANSETRON 4 MG: 2 INJECTION INTRAMUSCULAR; INTRAVENOUS at 21:40

## 2020-02-05 ASSESSMENT — ENCOUNTER SYMPTOMS
RHINORRHEA: 0
CARDIOVASCULAR NEGATIVE: 1
ABDOMINAL DISTENTION: 1
NAUSEA: 0
DYSURIA: 0
FEVER: 1
RESPIRATORY NEGATIVE: 1
MUSCULOSKELETAL NEGATIVE: 1
VOMITING: 0
FEVER: 1
DIARRHEA: 0
ABDOMINAL PAIN: 0
SHORTNESS OF BREATH: 1
GASTROINTESTINAL NEGATIVE: 1

## 2020-02-05 ASSESSMENT — MIFFLIN-ST. JEOR: SCORE: 2205.72

## 2020-02-05 NOTE — ED AVS SNAPSHOT
Emergency Department  [address] [place] [address]  Phone:  [phone]  Fax:  [phone]                                    Sinan Rodriguez   MRN: [id number]    Department:   Emergency Department   Date of Visit:  2/5/2020           After Visit Summary Signature Page    I have received my discharge instructions, and my questions have been answered. I have discussed any challenges I see with this plan with the nurse or doctor.    ..........................................................................................................................................  Patient/Patient Representative Signature      ..........................................................................................................................................  Patient Representative Print Name and Relationship to Patient    ..................................................               ................................................  Date                                   Time    ..........................................................................................................................................  Reviewed by Signature/Title    ...................................................              ..............................................  Date                                               Time          22EPIC Rev 08/18

## 2020-02-05 NOTE — PROGRESS NOTES
ua  SUBJECTIVE:   Sinan Rodriguez is a 49 year old male presenting with a chief complaint of   Chief Complaint   Patient presents with     Urgent Care     Breathing Problem     when breathing abdominal area feels tight.       He is an established patient of [facility].  Patient presents with a bloating feeling with deep breaths.  Patient states it started on 2/2/20 evening.  No pain anywhere.  Normal bowel movement and urinating normally.  Started with fever last night.  No nausea or vomiting, no ear pain, ST, cough, dysuria, complaints of anything else.      Review of Systems   Constitutional: Positive for fever.   HENT: Negative.    Respiratory: Negative.    Cardiovascular: Negative.    Gastrointestinal: Negative.         Abdominal bloating with deep breaths   Genitourinary: Negative.    Musculoskeletal: Negative.    All other systems reviewed and are negative.      Past Medical History:   Diagnosis Date     Hypertension      Family History   Problem Relation Age of Onset     Cholecystitis Father 67        passed away - gangrene     Cancer Brother         arm - sarcoma in HS     Current Outpatient Medications   Medication Sig Dispense Refill     amLODIPine (NORVASC) 10 MG tablet Take 1 tablet (10 mg) by mouth daily 90 tablet 3     aspirin (ASPIRIN ADULT) 325 MG tablet daily        cyclobenzaprine (FLEXERIL) 10 MG tablet TAKE 1 TABLET THREE TIMES A DAY AS NEEDED FOR MUSCLE SPASMS 90 tablet 0     losartan (COZAAR) 25 MG tablet Take 1 tablet (25 mg) by mouth daily 90 tablet 3     naproxen (NAPROSYN) 500 MG tablet Take 500 mg by mouth as needed        omeprazole (PRILOSEC) 20 MG DR capsule Take 1 capsule (20 mg) by mouth daily 90 capsule 3     rosuvastatin (CRESTOR) 20 MG tablet Take 1 tablet (20 mg) by mouth daily 90 tablet 3     zolpidem (AMBIEN) 10 MG tablet Take 10 mg by mouth       Social History     Tobacco Use     Smoking status: Current Some Day Smoker     Types: Cigars, Dip, chew, snus or snuff     Smokeless  tobacco: Current User   Substance Use Topics     Alcohol use: Yes     Frequency: 2-3 times a week       OBJECTIVE  /74   Pulse 107   Temp 100.1  F (37.8  C) (Oral)   Wt 125.6 kg (277 lb)   SpO2 96%   BMI 34.62 kg/m      Physical Exam  Vitals signs and nursing note reviewed.   Constitutional:       General: He is not in acute distress.     Appearance: Normal appearance. He is obese. He is not ill-appearing, toxic-appearing or diaphoretic.   HENT:      Head: Normocephalic and atraumatic.      Right Ear: Tympanic membrane, ear canal and external ear normal.      Left Ear: Tympanic membrane, ear canal and external ear normal.      Nose: Nose normal.      Mouth/Throat:      Mouth: Mucous membranes are moist.      Pharynx: Oropharynx is clear.   Eyes:      Extraocular Movements: Extraocular movements intact.      Conjunctiva/sclera: Conjunctivae normal.   Neck:      Musculoskeletal: Normal range of motion and neck supple. No neck rigidity or muscular tenderness.   Cardiovascular:      Rate and Rhythm: Normal rate and regular rhythm.      Pulses: Normal pulses.      Heart sounds: Normal heart sounds.   Pulmonary:      Effort: Pulmonary effort is normal.      Breath sounds: Normal breath sounds.   Abdominal:      General: Abdomen is flat. Bowel sounds are normal. There is no distension.      Palpations: Abdomen is soft. There is no mass.      Tenderness: There is no abdominal tenderness. There is no guarding or rebound.      Hernia: No hernia is present.   Lymphadenopathy:      Cervical: No cervical adenopathy.   Skin:     General: Skin is warm and dry.      Capillary Refill: Capillary refill takes less than 2 seconds.   Neurological:      General: No focal deficit present.      Mental Status: He is alert.   Psychiatric:         Mood and Affect: Mood normal.         Behavior: Behavior normal.         Labs:  Results for orders placed or performed in visit on 02/05/20 (from the past 24 hour(s))   CBC with platelets  and differential   Result Value Ref Range    WBC 14.9 (H) 4.0 - 11.0 10e9/L    RBC Count 5.04 4.4 - 5.9 10e12/L    Hemoglobin 13.9 13.3 - 17.7 g/dL    Hematocrit 42.0 40.0 - 53.0 %    MCV 83 78 - 100 fl    MCH 27.6 26.5 - 33.0 pg    MCHC 33.1 31.5 - 36.5 g/dL    RDW 14.5 10.0 - 15.0 %    Platelet Count 272 150 - 450 10e9/L    % Neutrophils 82.8 %    % Lymphocytes 8.6 %    % Monocytes 6.2 %    % Eosinophils 2.1 %    % Basophils 0.3 %    Absolute Neutrophil 12.3 (H) 1.6 - 8.3 10e9/L    Absolute Lymphocytes 1.3 0.8 - 5.3 10e9/L    Absolute Monocytes 0.9 0.0 - 1.3 10e9/L    Absolute Eosinophils 0.3 0.0 - 0.7 10e9/L    Absolute Basophils 0.0 0.0 - 0.2 10e9/L    Diff Method Automated Method        X-Ray was not done.    ASSESSMENT:      ICD-10-CM    1. Abdominal bloating R14.0 CBC with platelets and differential     *UA reflex to Microscopic and Culture ([facility] (except [place] and [place])        Medical Decision Making:    Differential Diagnosis:  Abdominal Pain: Constipation, viral infection.    Serious Comorbid Conditions:  Adult:  None    PLAN:    To ED for evaluation. Discussed lab results.     Followup:  Patient directed to ED for evaluation.  Patient left in stable condition.

## 2020-02-06 ENCOUNTER — APPOINTMENT (OUTPATIENT)
Dept: GENERAL RADIOLOGY | Facility: CLINIC | Age: 50
End: 2020-02-06
Attending: PHYSICIAN ASSISTANT
Payer: COMMERCIAL

## 2020-02-06 ENCOUNTER — HOSPITAL ENCOUNTER (EMERGENCY)
Facility: CLINIC | Age: 50
Discharge: HOME | End: 2020-02-06
Attending: PHYSICIAN ASSISTANT | Admitting: PHYSICIAN ASSISTANT
Payer: COMMERCIAL

## 2020-02-06 ENCOUNTER — APPOINTMENT (OUTPATIENT)
Dept: ULTRASOUND IMAGING | Facility: CLINIC | Age: 50
End: 2020-02-06
Attending: PHYSICIAN ASSISTANT
Payer: COMMERCIAL

## 2020-02-06 VITALS
BODY MASS INDEX: 34.54 KG/M2 | HEART RATE: 94 BPM | OXYGEN SATURATION: 98 % | DIASTOLIC BLOOD PRESSURE: 86 MMHG | SYSTOLIC BLOOD PRESSURE: 120 MMHG | WEIGHT: 277.8 LBS | HEIGHT: 75 IN | TEMPERATURE: 97.4 F | RESPIRATION RATE: 16 BRPM

## 2020-02-06 DIAGNOSIS — R22.32 MASS OF LEFT WRIST: ICD-10-CM

## 2020-02-06 PROCEDURE — 99285 EMERGENCY DEPT VISIT HI MDM: CPT | Mod: 25

## 2020-02-06 PROCEDURE — 73110 X-RAY EXAM OF WRIST: CPT | Mod: LT

## 2020-02-06 PROCEDURE — 93971 EXTREMITY STUDY: CPT | Mod: LT

## 2020-02-06 ASSESSMENT — MIFFLIN-ST. JEOR: SCORE: 2205.72

## 2020-02-06 ASSESSMENT — ENCOUNTER SYMPTOMS
COLOR CHANGE: 0
JOINT SWELLING: 1
CONSTIPATION: 0
ARTHRALGIAS: 0
SHORTNESS OF BREATH: 0

## 2020-02-06 NOTE — ED NOTES
Report received. Patient visualized. Patient is resting in bed and awaits MD. Will continue to monitor.

## 2020-02-06 NOTE — ED TRIAGE NOTES
Pt developed lump on left wrist over the past 2 hours. Denies injury. Has iodine injection yesterday.

## 2020-02-06 NOTE — TELEPHONE ENCOUNTER
ONCOLOGY INTAKE: Records Information      APPT INFORMATION:  Referring provider:  ED   Referring provider s clinic:  ED   Reason for visit/diagnosis:  Renal cell carcinoma of right kidney   Has patient been notified of appointment date and time?: yes     RECORDS INFORMATION:  Were the records received with the referral (via [employer])? Records in [facility]     Has patient been seen for any external appt for this diagnosis? Was seen at ED     If yes, where?     Has patient had any imaging or procedures outside of [facility] for this condition? Yes, CT chest       If Yes, where? [facility]     ADDITIONAL INFORMATION:  none

## 2020-02-06 NOTE — ED PROVIDER NOTES
"  History     Chief Complaint:  Wrist Pain      The history is provided by the patient.      Sinan Rodriguez is a 49 year old left-handed male who presents for evaluation of left wrist pain. About two hours ago, the patient began feeling some discomfort in his left wrist and \"felt like his arm was raising off the table\" so he looked down and saw a bump on his left distal forearm. He called his oncologist which wasn't able to see him until next week, so he came to the ED. He does not have any pain in the area, but it does feel sore and tight, especially when he moves his fingers. There is no redness and he denies chest pain and shortness of breath. He also denies trauma to the area.  He does have a history of blood clots and has had veins stripped in both legs.    Allergies:  Atorvastatin  Erythromycin  Iodine  Phenobarbital    Medications:    Ambien   Cozaar  Flexeril  Naproxen  Norvasc  Pravastatin    Past Medical History:    Cholelithiasis  Clot  GERD  Hyperlipidemia  Hypertension   Lumbar herniated disc  Obesity  Prediabetes  Varicose veins    Past Surgical History:    Colonoscopy  Knee surgery, scope multiple  Orthopedic surgery  Strip vein bilateral    Family History:    Cholecystitis  Arm cancer    Social History:  Marital Status:  Single [1]  Presents by himself.  Positive for tobacco use.  Positive for alcohol use.   Negative for drug use.    Review of Systems   Respiratory: Negative for shortness of breath.    Cardiovascular: Negative for chest pain.   Musculoskeletal: Positive for joint swelling. Negative for arthralgias (sore and tight left wrist).   Skin: Negative for color change.   All other systems reviewed and are negative.      Physical Exam     Patient Vitals for the past 24 hrs:   BP Temp Temp src Pulse Resp SpO2 Height Weight   02/06/20 1610 120/86 -- -- -- -- 98 % -- --   02/06/20 1153 (!) 141/80 97.4  F (36.3  C) Temporal 94 16 98 % 1.905 m (6' 3\") 126 kg (277 lb 12.8 oz)       Physical " Exam  Constitutional: Pleasant. Cooperative.   Eyes: Pupils equally round and reactive  HENT: Head is normal in appearance. Oropharynx is normal with moist mucus membranes.  Cardiovascular: Regular rate and rhythm and without murmurs.  Respiratory: Normal respiratory effort, lungs are clear bilaterally.  GI: Abdomen is soft, non-tender, non-distended. No guarding, rebound, or rigidity.  Musculoskeletal: No asymmetry of the lower extremities. Mass to distal left forearm just proximal to wrist on volar aspect. Non-tender. Full ROM of left upper extremity. 5/5 strength in LUE throughout.   Skin: Normal, without rash.  Neurologic: Cranial nerves grossly intact, normal cognition, no focal deficits. Alert and oriented x 3. Sensation intact distally.  Psychiatric: Normal affect.  Nursing notes and vital signs reviewed.      Emergency Department Course     Imaging:  Radiology findings were communicated with the patient who voiced understanding of the findings.    XR wrist left G/E 3 views:  Negative exam, as per radiology.     US upper extremity venous duplex left:  1.  Area of palpable concern in the left forearm represents a vascular  mass, concerning for metastasis.  2.  No evidence of , as per radiology.     Procedures:  None    Emergency Department Course:  Past medical records, nursing notes, and vitals reviewed.    1325 I performed an exam of the patient as documented above.     The patient was sent for a left wrist XR and an upper extremity venous duplex US while in the emergency department, results above.     1507 I rechecked the patient and discussed the results of his workup thus far.   1514 Patient rechecked and updated.   1600 Patient rechecked and updated.     Findings and plan explained to the patient. Patient discharged home with instructions regarding supportive care, medications, and reasons to return. The importance of close follow-up was reviewed.     I personally reviewed the imaging results with the  patient and answered all related questions prior to discharge.     Impression & Plan     Medical Decision Making:  Sinan Rodriguez is a 49 year old male who presents to the emergency department for evaluation of left wrist mass.  Patient noticed development of lump to his left distal forearm around 10 AM today.  This is in the setting of renal cell carcinoma diagnosis yesterday.  Patient has not seen oncology yet.  He denies any trauma.  See HPI as above for additional details.  Vitals and physical exam as above.  Differential included fracture, strain, sprain, hematoma, metastasis, ganglion cyst, DVT, among others.  The above work-up was obtained.  No evidence for DVT.  X-ray showed no evidence for fracture.  Ultrasound revealed mass that was vascularized.  Discussed with patient the unclear etiology of the mass.  Metastasis is certainly a possibility, however this seems quite atypical given that it just seemed to appear around 10 AM today. Another vascular anomaly may be possible as well, although was not noted on US. No injections to that forearm in prior work up to suggest association with previous injections.  Remainder of differential seems unlikely.  Will have patient follow-up with oncology as scheduled.  Discussed reasons to return.  All questions answered.  Patient discharged home in stable condition.    Diagnosis:    ICD-10-CM    1. Mass of left wrist R22.32        Disposition:  Discharged to home.    Scribe Disclosure:  Carol CAMPOVERDE, am serving as a scribe at 1:25 PM on 2/6/2020 to document services personally performed by Rajan Collado PA-C based on my observations and the provider's statements to me.      EMERGENCY DEPARTMENT       Rajan Collado PA-C  02/06/20 1932

## 2020-02-06 NOTE — ED PROVIDER NOTES
History   Chief Complaint:  Abdominal Distension    The history is provided by the patient.      Sinan Rodriguez is a 49 year old male with a history of GERD, hypertension, and hyperlipidemia who presents with abdominal discomfort. Two nights ago he developed a discomfort in his abdomen, like a bloating sensation, worse when he takes a deep breath. He has dyspnea only because of this sensation. He has had TMax of 100 F for which he took ibuprofen, last dose 3 hours prior to arrival. He also took an extra omeprazole out of concern for GERD without improvement in his symptoms. He went to urgent care for evaluation where a UA and CBC were performed and he was referred to the ER.    Sinan denies abdominal pain, chest pain, nausea, vomiting, diarrhea, dysuria, rhinorrhea, leg pain or swelling, or known sick contacts. He has had regular bowel movements recently. He recently traveled to [place]. He has no history of abdominal surgeries.    [facility] Workup- 02/05/2020:  CBC: WBC 14.9 (H) o/w WNL (HGB 13.9, )  UA macroscopic: Bilirubin- small, Ketones >= 80, Blood- small, Protein Albumin 30 o/w negative  Urine microscopic: RBC 2-5, Squamous Epithelial- moderate, Mucous- present o/w negative    Allergies:  Atorvastatin  Erythromycin  Iodine  Phenobarbital    Medications:   Amlodipine  Aspirin  Flexeril  Losartan  Prilosec  Crestor  Ambien    Past Medical History:    Essential hypertension  Mixed hyperlipidemia  GERD without esophagitis  Superficial clot  Insomnia  Lumbar herniated disc  Ineffective esophageal motility  Prediabetes  Cholelithiasis  Low back pain  Chondromalacia of patella  Varicose veins of other specified sites  Obesity    Past Surgical History:    Strip vein bilateral    Family History:    Cholecystitis   Sarcoma - arm    Social History:  Smoking status: No  Smokeless tobacco: Current user - chewing tobacco  Alcohol use: Yes  Drug use: Never  Presents with a female .    Review of  "Systems   Constitutional: Positive for fever (low-grade).   HENT: Negative for rhinorrhea.    Respiratory: Positive for shortness of breath (with deep breaths).    Cardiovascular: Negative for chest pain and leg swelling.   Gastrointestinal: Positive for abdominal distention. Negative for abdominal pain, constipation, diarrhea, nausea and vomiting.   Genitourinary: Negative for dysuria.   Musculoskeletal:        Negative for leg pain.   All other systems reviewed and are negative.    Physical Exam   Patient Vitals for the past 24 hrs:   BP Temp Temp src Pulse Heart Rate Resp SpO2 Height Weight   02/05/20 2300 (!) 131/118 -- -- 87 -- -- 98 % -- --   02/05/20 2230 (!) 123/91 -- -- 96 -- -- 97 % -- --   02/05/20 2200 124/77 -- -- 80 -- -- 98 % -- --   02/05/20 2045 135/88 -- -- -- -- -- -- -- --   02/05/20 1900 118/81 98.1  F (36.7  C) Temporal -- 95 18 97 % 1.905 m (6' 3\") 126 kg (277 lb 12.8 oz)     Physical Exam  General: Well-developed and well-nourished. Well appearing middle aged  man. Cooperative.  Head:  Atraumatic.  Eyes:  Conjunctivae, lids, and sclerae are normal.  ENT:    Normal nose. Moist mucous membranes.  Neck:  Supple. Normal range of motion.  CV:  Regular rate and rhythm. Normal heart sounds with no murmurs, rubs, or gallops detected.  Resp:  No respiratory distress. Clear to auscultation bilaterally without decreased breath sounds, wheezing, rales, or rhonchi.  GI:  Soft. Non-distended. Non-tender.    MS:  Normal ROM. No bilateral lower extremity edema.  Skin:  Warm. Non-diaphoretic. No pallor.  Neuro:  Awake. A&Ox3. Normal strength.  Psych: Normal mood and affect. Normal speech.  Vitals reviewed.    Emergency Department Course   Imaging:  Radiographic findings were communicated with the patient who voiced understanding of the findings.    CT Chest (PE)/Abdomen/Pelvis w Contrast:  1. Negative for pulmonary embolism.  2. Bulky soft tissue mass involving the left kidney measuring 4.7 x 6.9 cm " consistent with renal cell carcinoma. There are multiple bilateral pulmonary nodules, extensive mediastinal adenopathy and extensive retroperitoneal adenopathy as well as mesenteric adenopathy. There is an abnormal nodule in the right adrenal gland with 3 abnormal enhancing masses in the liver. Lytic bony destructive lesion involving right posterior rib 6 as well as bony destructive lesion in the left acetabulum. Constellation of findings are consistent with metastatic renal cell carcinoma.  As read by Radiology.    Laboratory:  Laboratory findings were communicated with the patient who voiced understanding of the findings.    CMP: Glucose 110 (H) o/w WNL (Creatinine 0.68)  Lipase: 676 (H)  D-dimer: 7.0 (H)    Interventions:  2140 Zofran 4 mg IV    Emergency Department Course:  Past medical records, nursing notes, and vitals reviewed.   1954 I performed an exam of the patient and obtained history, as documented above.    IV inserted and blood drawn. The patient was sent for a chest/abdomen/pelvis CT while in the emergency department, findings above.    2223 I spoke to Dr. Dupree of radiology regarding the findings on the patient's CT.    2303 I rechecked the patient. Explained findings to the patient. Discussed the plan with the patient.    2313 I spoke to Dr. Nash of oncology regarding the patient.    2329 I rechecked and updated the patient.    Findings and plan explained to the patient. Patient discharged home with instructions regarding supportive care, medications, and reasons to return. The importance of close follow-up was reviewed.     Impression & Plan    Medical Decision Making:  Sinan is a 49-year-old man who developed abdominal discomfort described as a bloating sensation 2 days ago.  He notes this is worse when he takes a deep breath but he denies other shortness of breath or true pain in the chest or abdomen.  He was sent from urgent care due to a leukocytosis of 14.9.  His exam is unremarkable.   However, I am somewhat concerned by the pleuritic nature of his discomfort and sent a d-dimer as he has had recent travel.  This is significantly elevated at 7.0.  As such, he was sent for CT scan of the chest, abdomen, and pelvis with contrast.  While his laboratory studies are reassuring aside from a minimally elevated lipase of 676, his CT reveals what is most likely a metastatic renal cell carcinoma with a left kidney soft tissue mass with multiple pulmonary nodules, mediastinal, mesenteric, and retroperitoneal adenopathy, right adrenal nodule, liver masses, and lytic bony lesions of the right sixth posterior rib and left acetabulum.  He was given some Zofran without much change in his symptoms which were quite mild and vague to begin with.  Because he is not acutely ill and his symptoms are mild he does not require admission.  However, I did discuss his case with Dr. Nash, hematology/oncology, who will have the oncology office contact Sinan to arrange close outpatient follow-up as he will clearly need biopsy and further work-up and treatment as urgently as possible.  I discussed the results at length with Sinan and his significant other and he verbalized understanding of likely metastatic cancer diagnosis.  He understands he will be called by the cancer center but I have also provided him with their number if he has not heard from them in a timely manner.  He will return with new concerns.  All questions answered.  Amenable to discharge.    Diagnosis:    ICD-10-CM   1. Renal cell carcinoma of right kidney metastatic to other site (H) C64.1        Disposition:  Discharged home.    2/5/2020   David Williamson I, David Williamson, am serving as a scribe at 7:51 PM on 2/5/2020 to document services personally performed by Nereida Haas MD based on my observations and the provider's statements to me.      Nereida Haas MD  02/06/20 1961

## 2020-02-06 NOTE — ED PROVIDER NOTES
Emergency Department Attending Supervision Note  2/6/2020  3:17 PM      I evaluated this patient in conjunction with Rajan Collado PA-C.      Briefly, the patient presented with acute painless swelling to his left anterior distal forearm that he noticed several hours ago.  He was in this emergency department yesterday for abdominal symptoms and was diagnosed with a renal mass presumably a new diagnosis of metastatic renal cell carcinoma, though no tissue diagnosis has yet been confirmed.  He has an oncology appointment set for Monday, in 4 days.  He denies any known trauma to his left forearm.  No fevers or rash.  No recent IVs in his left arm.  No prior similar problems.  He spends extensive time typing for work, including resting his wrist at the edge of the desk.  He has no chest pain or trouble breathing.      On my exam, he has modest swelling to the left distal anterior forearm without fluctuance or induration.  Good range of motion of left wrist and fingers.  Easily palpable left wrist pulses, no thrill palpable.  No overlying skin changes or crepitus.    XR wrist left G/E 3 views:  Negative exam, as per radiology.      US upper extremity venous duplex left:  1.  Area of palpable concern in the left forearm represents a vascular  mass, concerning for metastasis.  2.  No evidence of DVT, as per radiology.     My impression is that the etiology of his swelling in the left anterior wrist is unconfirmed.  No overlying skin changes or fluctuance to suggest infection.  I do not think that attempt at aspiration is indicated at this time.  No signs of acute vascular occlusion or compartment syndrome.  No bony pathology detected.  While the radiologist read comments on the possibility that this represents a metastasis, the time course and location would be quite unusual for this.  Tissue diagnosis may ultimately be pursued.  I explained this diagnostic uncertainty to the patient, as well as my recommendation for close  outpatient follow-up.  I do not think that immediate consultation or hospitalization is indicated.  We discussed close monitoring of this.  I offered to place an Ace wrap or other measures to support his wrist, which he declined, as he wishes to be able to fully observe this out of concern that it will grow rapidly.  Patient stated that he has the means to be seen anywhere necessary, and mentioned a variety of prestigious cancer centers around the country that he plans to contact in an effort to get seen sooner than his currently scheduled appointment for this coming Monday, in 4 days.  He was specifically asked to return here for sudden worsening at any hour.  He was discharged home.      Diagnosis    ICD-10-CM    1. Mass of left wrist R22.32          JEREMY Bach MD    This record was created at least in part using electronic voice recognition software, so please excuse any typographical errors.         Luis Bach MD  02/06/20 [id number]

## 2020-02-06 NOTE — ED AVS SNAPSHOT
Emergency Department  [address] [place] [id number]  Phone:  [phone]  Fax:  [phone]                                    Sinan Rodriguez   MRN: [id number]    Department:   Emergency Department   Date of Visit:  2/6/2020           After Visit Summary Signature Page    I have received my discharge instructions, and my questions have been answered. I have discussed any challenges I see with this plan with the nurse or doctor.    ..........................................................................................................................................  Patient/Patient Representative Signature      ..........................................................................................................................................  Patient Representative Print Name and Relationship to Patient    ..................................................               ................................................  Date                                   Time    ..........................................................................................................................................  Reviewed by Signature/Title    ...................................................              ..............................................  Date                                               Time          22EPIC Rev 08/18

## 2020-02-06 NOTE — DISCHARGE INSTRUCTIONS
You should be called by the [facility].  If you are not called by midday on Friday please call them to arrange follow-up.  You will need a biopsy for definitive diagnosis.  Return with new concerns.

## 2020-02-07 ENCOUNTER — TEAM CONFERENCE (OUTPATIENT)
Dept: INTERVENTIONAL RADIOLOGY/VASCULAR | Facility: CLINIC | Age: 50
End: 2020-02-07

## 2020-02-07 ENCOUNTER — TRANSFERRED RECORDS (OUTPATIENT)
Dept: HEALTH INFORMATION MANAGEMENT | Facility: CLINIC | Age: 50
End: 2020-02-07

## 2020-02-07 ENCOUNTER — PREP FOR PROCEDURE (OUTPATIENT)
Dept: SURGERY | Facility: CLINIC | Age: 50
End: 2020-02-07

## 2020-02-07 DIAGNOSIS — R59.0 MEDIASTINAL ADENOPATHY: Primary | ICD-10-CM

## 2020-02-07 DIAGNOSIS — N28.89 RENAL MASS: ICD-10-CM

## 2020-02-07 NOTE — TELEPHONE ENCOUNTER
Pulmonary Nodule Conference      Patient Name: Sinan Rodriguez    Reason for conference discussion (brief overview): 49 year old male with a history of GERD, hypertension, and hyperlipidemia who presents with abdominal discomfort. Two nights ago he developed a discomfort in his abdomen, like a bloating sensation, worse when he takes a deep breath. He has dyspnea only because of this sensation. He has had TMax of 100 F for which he took ibuprofen, last dose 3 hours prior to arrival. He also took an extra omeprazole out of concern for GERD without improvement in his symptoms. He went to urgent care for evaluation where a UA and CBC were performed and he was referred to the ER.  CT Chest (PE)/Abdomen/Pelvis w Contrast:  1. Negative for pulmonary embolism.  2. Bulky soft tissue mass involving the left kidney measuring 4.7 x 6.9 cm consistent with renal cell carcinoma. There are multiple bilateral pulmonary nodules, extensive mediastinal adenopathy and extensive retroperitoneal adenopathy as well as mesenteric adenopathy. There is an abnormal nodule in the right adrenal gland with 3 abnormal enhancing masses in the liver. Lytic bony destructive lesion involving right posterior rib 6 as well as bony destructive lesion in the left acetabulum. Constellation of findings are consistent with metastatic renal cell carcinoma.  Specific Question:  What can be biopsied. Prefer not to do bone    Pertinent Histology:  NA    Referring Physician: med onc    The patient's case was presented at the multidisciplinary conference for the above noted reason.  There was a consensus recommendation for the following actions:     The consensus from the group was that pulmonology could do a flex and radial bronchoscopy and EBUS.  I will send a message to med onc.  He has an appt with Dr. Zen Tolentino on 2/10/2020     Case Lead:  Kelly Burnett, EDE    Interventional Radiology Staff Present: Gianfranco Zepeda MD.  Dr. Baig was present from  pulmonology

## 2020-02-10 ENCOUNTER — PRE VISIT (OUTPATIENT)
Facility: CLINIC | Age: 50
End: 2020-02-10

## 2020-02-12 ENCOUNTER — TRANSFERRED RECORDS (OUTPATIENT)
Dept: HEALTH INFORMATION MANAGEMENT | Facility: CLINIC | Age: 50
End: 2020-02-12

## 2020-02-19 ENCOUNTER — TRANSFERRED RECORDS (OUTPATIENT)
Dept: HEALTH INFORMATION MANAGEMENT | Facility: CLINIC | Age: 50
End: 2020-02-19

## 2020-02-28 ENCOUNTER — TRANSFERRED RECORDS (OUTPATIENT)
Dept: HEALTH INFORMATION MANAGEMENT | Facility: CLINIC | Age: 50
End: 2020-02-28

## 2020-02-29 ENCOUNTER — TRANSFERRED RECORDS (OUTPATIENT)
Dept: HEALTH INFORMATION MANAGEMENT | Facility: CLINIC | Age: 50
End: 2020-02-29

## 2020-02-29 LAB
CREAT SERPL-MCNC: 0.8 MG/DL (ref 0.72–1.25)
GFR SERPL CREATININE-BSD FRML MDRD: >60 ML/MIN/1.73M2
GLUCOSE SERPL-MCNC: 138 MG/DL (ref 65–100)
POTASSIUM SERPL-SCNC: 3.9 MMOL/L (ref 3.5–5)

## 2020-03-10 DIAGNOSIS — I10 ESSENTIAL HYPERTENSION: ICD-10-CM

## 2020-03-10 DIAGNOSIS — E78.2 MIXED HYPERLIPIDEMIA: ICD-10-CM

## 2020-03-10 RX ORDER — AMLODIPINE BESYLATE 10 MG/1
TABLET ORAL
Qty: 90 TABLET | Refills: 0 | Status: SHIPPED | OUTPATIENT
Start: 2020-03-10 | End: 2020-05-27

## 2020-03-10 RX ORDER — ROSUVASTATIN CALCIUM 20 MG/1
TABLET, COATED ORAL
Qty: 90 TABLET | Refills: 0 | Status: SHIPPED | OUTPATIENT
Start: 2020-03-10 | End: 2020-05-27

## 2020-03-10 NOTE — TELEPHONE ENCOUNTER
"Requested Prescriptions   Pending Prescriptions Disp Refills     amLODIPine (NORVASC) 10 MG tablet [Pharmacy Med Name: AMLODIPINE BESYLATE TABS 10MG] 90 tablet 3     Sig: TAKE 1 TABLET DAILY       Calcium Channel Blockers Protocol  Passed - 3/10/2020  7:41 AM        Passed - Blood pressure under 140/90 in past 12 months     BP Readings from Last 3 Encounters:   02/06/20 120/86   02/05/20 (!) 131/118   02/05/20 121/74                 Passed - Recent (12 mo) or future (30 days) visit within the authorizing provider's specialty     Patient has had an office visit with the authorizing provider or a provider within the authorizing providers department within the previous 12 mos or has a future within next 30 days. See \"Patient Info\" tab in inbasket, or \"Choose Columns\" in Meds & Orders section of the refill encounter.              Passed - Medication is active on med list        Passed - Patient is age 18 or older        Passed - Normal serum creatinine on file in past 12 months     Recent Labs   Lab Test 02/05/20  1939   CR 0.68                rosuvastatin (CRESTOR) 20 MG tablet [Pharmacy Med Name: ROSUVASTATIN TABS 20MG] 90 tablet 3     Sig: TAKE 1 TABLET DAILY       Statins Protocol Passed - 3/10/2020  7:41 AM        Passed - LDL on file in past 12 months     Recent Labs   Lab Test 07/12/19  0929   LDL 80             Passed - No abnormal creatine kinase in past 12 months     Recent Labs   Lab Test 07/12/19  0929                   Passed - Recent (12 mo) or future (30 days) visit within the authorizing provider's specialty     Patient has had an office visit with the authorizing provider or a provider within the authorizing providers department within the previous 12 mos or has a future within next 30 days. See \"Patient Info\" tab in inbasket, or \"Choose Columns\" in Meds & Orders section of the refill encounter.              Passed - Medication is active on med list        Passed - Patient is age 18 or older    "      Medication is being filled for 1 time refill only due to:  Patient needs to be seen because due for follow up appointment with PCP.

## 2020-03-18 ENCOUNTER — DOCUMENTATION ONLY (OUTPATIENT)
Dept: OTHER | Facility: CLINIC | Age: 50
End: 2020-03-18

## 2020-03-18 ENCOUNTER — AMBULATORY - HEALTHEAST (OUTPATIENT)
Dept: OTHER | Facility: CLINIC | Age: 50
End: 2020-03-18

## 2020-03-20 ENCOUNTER — TRANSFERRED RECORDS (OUTPATIENT)
Dept: HEALTH INFORMATION MANAGEMENT | Facility: CLINIC | Age: 50
End: 2020-03-20

## 2020-04-10 ENCOUNTER — TRANSFERRED RECORDS (OUTPATIENT)
Dept: HEALTH INFORMATION MANAGEMENT | Facility: CLINIC | Age: 50
End: 2020-04-10

## 2020-05-01 ENCOUNTER — TRANSFERRED RECORDS (OUTPATIENT)
Dept: HEALTH INFORMATION MANAGEMENT | Facility: CLINIC | Age: 50
End: 2020-05-01

## 2020-05-18 DIAGNOSIS — I10 ESSENTIAL HYPERTENSION: ICD-10-CM

## 2020-05-18 DIAGNOSIS — K21.9 GASTROESOPHAGEAL REFLUX DISEASE WITHOUT ESOPHAGITIS: ICD-10-CM

## 2020-05-18 DIAGNOSIS — E78.2 MIXED HYPERLIPIDEMIA: ICD-10-CM

## 2020-05-19 RX ORDER — ROSUVASTATIN CALCIUM 20 MG/1
TABLET, COATED ORAL
Start: 2020-05-19

## 2020-05-19 NOTE — TELEPHONE ENCOUNTER
Rosuvastatin:  Too soon.  Rx sent 3/10/2020 for #90    Last seen 4/26/19 for physical.  [employer] message sent to patient    Jolynn Weathers RN

## 2020-05-22 RX ORDER — LOSARTAN POTASSIUM 25 MG/1
TABLET ORAL
Qty: 30 TABLET | Refills: 0 | Status: SHIPPED | OUTPATIENT
Start: 2020-05-22 | End: 2020-05-27

## 2020-05-22 NOTE — TELEPHONE ENCOUNTER
Prescription approved per [facility] Refill Protocol.  Sujey DE LA CRUZ RN

## 2020-05-27 ENCOUNTER — MYC MEDICAL ADVICE (OUTPATIENT)
Dept: FAMILY MEDICINE | Facility: CLINIC | Age: 50
End: 2020-05-27

## 2020-05-27 ENCOUNTER — VIRTUAL VISIT (OUTPATIENT)
Dept: FAMILY MEDICINE | Facility: CLINIC | Age: 50
End: 2020-05-27
Payer: COMMERCIAL

## 2020-05-27 ENCOUNTER — TRANSFERRED RECORDS (OUTPATIENT)
Dept: HEALTH INFORMATION MANAGEMENT | Facility: CLINIC | Age: 50
End: 2020-05-27

## 2020-05-27 DIAGNOSIS — Z11.3 ROUTINE SCREENING FOR STI (SEXUALLY TRANSMITTED INFECTION): ICD-10-CM

## 2020-05-27 DIAGNOSIS — I10 ESSENTIAL HYPERTENSION: ICD-10-CM

## 2020-05-27 DIAGNOSIS — C79.51 METASTATIC RENAL CELL CARCINOMA TO BONE (H): ICD-10-CM

## 2020-05-27 DIAGNOSIS — G47.00 INSOMNIA, UNSPECIFIED TYPE: Primary | ICD-10-CM

## 2020-05-27 DIAGNOSIS — K21.9 GASTROESOPHAGEAL REFLUX DISEASE WITHOUT ESOPHAGITIS: ICD-10-CM

## 2020-05-27 DIAGNOSIS — C64.9 METASTATIC RENAL CELL CARCINOMA TO BONE (H): ICD-10-CM

## 2020-05-27 DIAGNOSIS — R73.09 ELEVATED GLUCOSE: ICD-10-CM

## 2020-05-27 DIAGNOSIS — E78.2 MIXED HYPERLIPIDEMIA: ICD-10-CM

## 2020-05-27 PROCEDURE — 99214 OFFICE O/P EST MOD 30 MIN: CPT | Mod: TEL | Performed by: FAMILY MEDICINE

## 2020-05-27 RX ORDER — AMLODIPINE BESYLATE 10 MG/1
10 TABLET ORAL DAILY
Qty: 90 TABLET | Refills: 3 | Status: SHIPPED | OUTPATIENT
Start: 2020-05-27 | End: 2021-04-12

## 2020-05-27 RX ORDER — ROSUVASTATIN CALCIUM 20 MG/1
20 TABLET, COATED ORAL DAILY
Qty: 90 TABLET | Refills: 3 | Status: SHIPPED | OUTPATIENT
Start: 2020-05-27 | End: 2021-02-05

## 2020-05-27 RX ORDER — ZOLPIDEM TARTRATE 10 MG/1
10 TABLET ORAL
Qty: 30 TABLET | Refills: 1 | Status: SHIPPED | OUTPATIENT
Start: 2020-05-27 | End: 2022-08-10

## 2020-05-27 RX ORDER — LOSARTAN POTASSIUM 25 MG/1
25 TABLET ORAL DAILY
Qty: 90 TABLET | Refills: 3 | Status: SHIPPED | OUTPATIENT
Start: 2020-05-27 | End: 2020-08-13

## 2020-05-27 NOTE — PROGRESS NOTES
"Sinan Rodriguez is a 49 year old male who is being evaluated via a billable telephone visit.      The patient has been notified of following:     \"This telephone visit will be conducted via a call between you and your physician/provider. We have found that certain health care needs can be provided without the need for a physical exam.  This service lets us provide the care you need with a short phone conversation.  If a prescription is necessary we can send it directly to your pharmacy.  If lab work is needed we can place an order for that and you can then stop by our lab to have the test done at a later time.    Telephone visits are billed at different rates depending on your insurance coverage. During this emergency period, for some insurers they may be billed the same as an in-person visit.  Please reach out to your insurance provider with any questions.    If during the course of the call the physician/provider feels a telephone visit is not appropriate, you will not be charged for this service.\"    Patient has given verbal consent for Telephone visit?  Yes    What phone number would you like to be contacted at? [phone]    How would you like to obtain your AVS? Kourtneyhart    Tara     Sinan Rodrgiuez is a 49 year old male who presents via phone visit today for the following health issues:    HPI  Hyperlipidemia Follow-Up      Are you regularly taking any medication or supplement to lower your cholesterol?   Crestor    Are you having muscle aches or other side effects that you think could be caused by your cholesterol lowering medication?  No    Hypertension Follow-up      Do you check your blood pressure regularly outside of the clinic? No     Are you following a low salt diet? Yes    Are your blood pressures ever more than 140 on the top number (systolic) OR more   than 90 on the bottom number (diastolic), for example 140/90? No      How many servings of fruits and vegetables do you eat daily?  0-1    On " average, how many sweetened beverages do you drink each day (Examples: soda, juice, sweet tea, etc.  Do NOT count diet or artificially sweetened beverages)?   0    How many days per week do you exercise enough to make your heart beat faster? 7    How many minutes a day do you exercise enough to make your heart beat faster? 30 - 60    How many days per week do you miss taking your medication? 0    Was taking the blood pressure regularly - 115-125/75-85   Last 128/77 May 27th     part of clinical trial -   Finished the first 3 months   All the tumors except for one are smaller    One of the tumors was on the femur    -------------------------------------    Patient Active Problem List   Diagnosis     Chondromalacia of patella     Knee pain     Low back pain     Essential hypertension     Clot     Mixed hyperlipidemia     Gastroesophageal reflux disease without esophagitis     Other insomnia     Lumbar herniated disc     Ineffective esophageal motility     Obesity, unspecified     Prediabetes     Cholelithiasis     Varicose veins of other specified sites     Metastatic renal cell carcinoma to bone (H)     Past Surgical History:   Procedure Laterality Date     COLONOSCOPY N/A 8/23/2019    Procedure: COLONOSCOPY;  Surgeon: Silvaon Segovia MD;  Location:  GI     KNEE SURGERY Bilateral     scope multiple - most recent 2014     ORTHOPEDIC SURGERY       STRIP VEIN BILATERAL Bilateral 2015       Social History     Tobacco Use     Smoking status: Current Some Day Smoker     Types: Cigars, Dip, chew, snus or snuff     Smokeless tobacco: Current User   Substance Use Topics     Alcohol use: Yes     Frequency: 2-3 times a week     Family History   Problem Relation Age of Onset     Cholecystitis Father 67        passed away - gangrene     Cancer Brother         arm - sarcoma in HS           Reviewed and updated as needed this visit by Provider         Review of Systems   Constitutional, HEENT, cardiovascular, pulmonary, GI,  , musculoskeletal, neuro, skin, endocrine and psych systems are negative, except as otherwise noted.       Objective   Reported vitals:  There were no vitals taken for this visit. , alert and no distress  PSYCH: Alert and oriented times 3; coherent speech, normal   rate and volume, able to articulate logical thoughts, able   to abstract reason, no tangential thoughts, no hallucinations   or delusions  His affect is normal  RESP: No cough, no audible wheezing, able to talk in full sentences  Remainder of exam unable to be completed due to telephone visits    Diagnostic Test Results:  Labs reviewed in Epic        Assessment/Plan:  1. Metastatic renal cell carcinoma to bone (H)  Working with [facility] - in immunotherapy study  - Comprehensive metabolic panel (BMP + Alb, Alk Phos, ALT, AST, Total. Bili, TP); Future  - **TSH with free T4 reflex FUTURE anytime; Future  - **CBC with platelets FUTURE anytime; Future    2. Essential hypertension  BP controlled on current meds  Will refill for now  Stopped  naproxen   - amLODIPine (NORVASC) 10 MG tablet; Take 1 tablet (10 mg) by mouth daily  Dispense: 90 tablet; Refill: 3  - losartan (COZAAR) 25 MG tablet; Take 1 tablet (25 mg) by mouth daily  Dispense: 90 tablet; Refill: 3  - Comprehensive metabolic panel (BMP + Alb, Alk Phos, ALT, AST, Total. Bili, TP); Future  - **TSH with free T4 reflex FUTURE anytime; Future  - **CBC with platelets FUTURE anytime; Future    3. Gastroesophageal reflux disease without esophagitis  GERD stable - refilled   - omeprazole (PRILOSEC) 20 MG DR capsule; Take 1 capsule (20 mg) by mouth daily  Dispense: 90 capsule; Refill: 3  - Comprehensive metabolic panel (BMP + Alb, Alk Phos, ALT, AST, Total. Bili, TP); Future  - **TSH with free T4 reflex FUTURE anytime; Future  - **CBC with platelets FUTURE anytime; Future    4. Mixed hyperlipidemia     - Lipid panel reflex to direct LDL Fasting; Future  - rosuvastatin (CRESTOR) 20 MG tablet; Take 1 tablet  (20 mg) by mouth daily  Dispense: 90 tablet; Refill: 3  - Comprehensive metabolic panel (BMP + Alb, Alk Phos, ALT, AST, Total. Bili, TP); Future  - **TSH with free T4 reflex FUTURE anytime; Future  - **CBC with platelets FUTURE anytime; Future    5. Insomnia, unspecified type  Will refill the ambien using prn   - zolpidem (AMBIEN) 10 MG tablet; Take 1 tablet (10 mg) by mouth nightly as needed for sleep  Dispense: 30 tablet; Refill: 1    6. Elevated glucose     - **A1C FUTURE anytime; Future  - Comprehensive metabolic panel (BMP + Alb, Alk Phos, ALT, AST, Total. Bili, TP); Future  - **CBC with platelets FUTURE anytime; Future    7. Routine screening for STI (sexually transmitted infection)     - HIV Antigen Antibody Combo; Future  - Hepatitis B Surface Antibody; Future  - **Hepatitis C Screen Reflex to RNA FUTURE anytime; Future  - Treponema Abs w Reflex to RPR and Titer; Future  - NEISSERIA GONORRHOEA PCR; Future  - CHLAMYDIA TRACHOMATIS PCR; Future    No follow-ups on file.      Phone call duration: 19 minutes    Jyotsna Mora, DO

## 2020-06-02 ENCOUNTER — MYC MEDICAL ADVICE (OUTPATIENT)
Dept: FAMILY MEDICINE | Facility: CLINIC | Age: 50
End: 2020-06-02

## 2020-06-02 DIAGNOSIS — G89.18 POSTOPERATIVE PAIN: Primary | ICD-10-CM

## 2020-06-02 RX ORDER — OXYCODONE HYDROCHLORIDE 5 MG/1
5 TABLET ORAL EVERY 6 HOURS PRN
Qty: 30 TABLET | Refills: 0 | Status: SHIPPED | OUTPATIENT
Start: 2020-06-02 | End: 2020-06-05

## 2020-06-02 NOTE — TELEPHONE ENCOUNTER
PN  [facility]  [facility] to discuss or let him know one week of opioids is sufficient post surgery?  Thank you,  Rebeca Kelley RN

## 2020-06-02 NOTE — TELEPHONE ENCOUNTER
[place] let him know I faxed and Rx for qty 30   They often want patients to start lowering the dose of the pain medication after the first 3-7 days.    Hopefully pharmacy will fill but [place] has state law for acute pain to only fill a 3 day supply  Post op may have slight increase but will have to see.  If needs future refill will have visit  Thanks  JHONNY

## 2020-06-26 ENCOUNTER — TRANSFERRED RECORDS (OUTPATIENT)
Dept: HEALTH INFORMATION MANAGEMENT | Facility: CLINIC | Age: 50
End: 2020-06-26

## 2020-07-10 ENCOUNTER — TRANSFERRED RECORDS (OUTPATIENT)
Dept: HEALTH INFORMATION MANAGEMENT | Facility: CLINIC | Age: 50
End: 2020-07-10

## 2020-07-16 ENCOUNTER — MYC MEDICAL ADVICE (OUTPATIENT)
Dept: FAMILY MEDICINE | Facility: CLINIC | Age: 50
End: 2020-07-16

## 2020-07-16 DIAGNOSIS — I10 ESSENTIAL HYPERTENSION: ICD-10-CM

## 2020-07-17 NOTE — TELEPHONE ENCOUNTER
Please have the patient increase the losartan from 25mg up to 50mg (so two tablets a day).  Keep rechecking BP for the next 2 weeks then a virtual visit to discuss   Thanks  PN

## 2020-07-24 ENCOUNTER — TRANSFERRED RECORDS (OUTPATIENT)
Dept: HEALTH INFORMATION MANAGEMENT | Facility: CLINIC | Age: 50
End: 2020-07-24

## 2020-07-24 DIAGNOSIS — E78.2 MIXED HYPERLIPIDEMIA: ICD-10-CM

## 2020-07-24 DIAGNOSIS — C79.51 METASTATIC RENAL CELL CARCINOMA TO BONE (H): ICD-10-CM

## 2020-07-24 DIAGNOSIS — R73.09 ELEVATED GLUCOSE: ICD-10-CM

## 2020-07-24 DIAGNOSIS — C64.9 METASTATIC RENAL CELL CARCINOMA TO BONE (H): ICD-10-CM

## 2020-07-24 DIAGNOSIS — K21.9 GASTROESOPHAGEAL REFLUX DISEASE WITHOUT ESOPHAGITIS: ICD-10-CM

## 2020-07-24 DIAGNOSIS — I10 ESSENTIAL HYPERTENSION: ICD-10-CM

## 2020-07-24 DIAGNOSIS — Z11.3 ROUTINE SCREENING FOR STI (SEXUALLY TRANSMITTED INFECTION): ICD-10-CM

## 2020-07-24 LAB
ALBUMIN SERPL-MCNC: 3.8 G/DL (ref 3.4–5)
ALP SERPL-CCNC: 106 U/L (ref 40–150)
ALT SERPL W P-5'-P-CCNC: 97 U/L (ref 0–70)
ANION GAP SERPL CALCULATED.3IONS-SCNC: 7 MMOL/L (ref 3–14)
AST SERPL W P-5'-P-CCNC: 49 U/L (ref 0–45)
BILIRUB SERPL-MCNC: 0.3 MG/DL (ref 0.2–1.3)
BUN SERPL-MCNC: 9 MG/DL (ref 7–30)
CALCIUM SERPL-MCNC: 8.3 MG/DL (ref 8.5–10.1)
CHLORIDE SERPL-SCNC: 105 MMOL/L (ref 94–109)
CHOLEST SERPL-MCNC: 164 MG/DL
CO2 SERPL-SCNC: 25 MMOL/L (ref 20–32)
CREAT SERPL-MCNC: 0.67 MG/DL (ref 0.66–1.25)
ERYTHROCYTE [DISTWIDTH] IN BLOOD BY AUTOMATED COUNT: 17.7 % (ref 10–15)
GFR SERPL CREATININE-BSD FRML MDRD: >90 ML/MIN/{1.73_M2}
GLUCOSE SERPL-MCNC: 109 MG/DL (ref 70–99)
HBA1C MFR BLD: 6.3 % (ref 0–5.6)
HCT VFR BLD AUTO: 47.5 % (ref 40–53)
HDLC SERPL-MCNC: 40 MG/DL
HGB BLD-MCNC: 15.8 G/DL (ref 13.3–17.7)
LDLC SERPL CALC-MCNC: 75 MG/DL
MCH RBC QN AUTO: 26.2 PG (ref 26.5–33)
MCHC RBC AUTO-ENTMCNC: 33.3 G/DL (ref 31.5–36.5)
MCV RBC AUTO: 79 FL (ref 78–100)
NONHDLC SERPL-MCNC: 124 MG/DL
PLATELET # BLD AUTO: 214 10E9/L (ref 150–450)
POTASSIUM SERPL-SCNC: 3.8 MMOL/L (ref 3.4–5.3)
PROT SERPL-MCNC: 7.4 G/DL (ref 6.8–8.8)
RBC # BLD AUTO: 6.03 10E12/L (ref 4.4–5.9)
SODIUM SERPL-SCNC: 137 MMOL/L (ref 133–144)
T4 FREE SERPL-MCNC: 1.29 NG/DL (ref 0.76–1.46)
TRIGL SERPL-MCNC: 243 MG/DL
TSH SERPL DL<=0.005 MIU/L-ACNC: 4.24 MU/L (ref 0.4–4)
WBC # BLD AUTO: 8 10E9/L (ref 4–11)

## 2020-07-24 PROCEDURE — 86780 TREPONEMA PALLIDUM: CPT | Performed by: FAMILY MEDICINE

## 2020-07-24 PROCEDURE — 84439 ASSAY OF FREE THYROXINE: CPT | Performed by: FAMILY MEDICINE

## 2020-07-24 PROCEDURE — 80061 LIPID PANEL: CPT | Performed by: FAMILY MEDICINE

## 2020-07-24 PROCEDURE — 86803 HEPATITIS C AB TEST: CPT | Performed by: FAMILY MEDICINE

## 2020-07-24 PROCEDURE — 87591 N.GONORRHOEAE DNA AMP PROB: CPT | Performed by: FAMILY MEDICINE

## 2020-07-24 PROCEDURE — 87491 CHLMYD TRACH DNA AMP PROBE: CPT | Performed by: FAMILY MEDICINE

## 2020-07-24 PROCEDURE — 86706 HEP B SURFACE ANTIBODY: CPT | Performed by: FAMILY MEDICINE

## 2020-07-24 PROCEDURE — 83036 HEMOGLOBIN GLYCOSYLATED A1C: CPT | Performed by: FAMILY MEDICINE

## 2020-07-24 PROCEDURE — 80053 COMPREHEN METABOLIC PANEL: CPT | Performed by: FAMILY MEDICINE

## 2020-07-24 PROCEDURE — 84443 ASSAY THYROID STIM HORMONE: CPT | Performed by: FAMILY MEDICINE

## 2020-07-24 PROCEDURE — 87389 HIV-1 AG W/HIV-1&-2 AB AG IA: CPT | Performed by: FAMILY MEDICINE

## 2020-07-24 PROCEDURE — 85027 COMPLETE CBC AUTOMATED: CPT | Performed by: FAMILY MEDICINE

## 2020-07-24 PROCEDURE — 36415 COLL VENOUS BLD VENIPUNCTURE: CPT | Performed by: FAMILY MEDICINE

## 2020-07-25 LAB — T PALLIDUM AB SER QL: NONREACTIVE

## 2020-07-26 ENCOUNTER — MYC MEDICAL ADVICE (OUTPATIENT)
Dept: FAMILY MEDICINE | Facility: CLINIC | Age: 50
End: 2020-07-26

## 2020-07-27 LAB
HBV SURFACE AB SERPL IA-ACNC: >1000 M[IU]/ML
HCV AB SERPL QL IA: NONREACTIVE
HIV 1+2 AB+HIV1 P24 AG SERPL QL IA: NONREACTIVE

## 2020-08-07 ENCOUNTER — TRANSFERRED RECORDS (OUTPATIENT)
Dept: HEALTH INFORMATION MANAGEMENT | Facility: CLINIC | Age: 50
End: 2020-08-07

## 2020-08-12 ENCOUNTER — MYC MEDICAL ADVICE (OUTPATIENT)
Dept: FAMILY MEDICINE | Facility: CLINIC | Age: 50
End: 2020-08-12

## 2020-08-12 DIAGNOSIS — I10 ESSENTIAL HYPERTENSION: ICD-10-CM

## 2020-08-13 RX ORDER — LOSARTAN POTASSIUM 25 MG/1
75 TABLET ORAL DAILY
Qty: 90 TABLET | Refills: 0 | Status: SHIPPED | OUTPATIENT
Start: 2020-08-13 | End: 2020-09-09

## 2020-08-13 NOTE — TELEPHONE ENCOUNTER
PN,    Please see [employer] message below.  Offered patient appointment today with CLAY.    Do you want to see patient?  Do virtual visit? - currently your schedule is full     Jolynn Weathers RN

## 2020-08-21 ENCOUNTER — TRANSFERRED RECORDS (OUTPATIENT)
Dept: HEALTH INFORMATION MANAGEMENT | Facility: CLINIC | Age: 50
End: 2020-08-21

## 2020-09-02 ENCOUNTER — TRANSFERRED RECORDS (OUTPATIENT)
Dept: HEALTH INFORMATION MANAGEMENT | Facility: CLINIC | Age: 50
End: 2020-09-02

## 2020-09-04 ENCOUNTER — TRANSFERRED RECORDS (OUTPATIENT)
Dept: HEALTH INFORMATION MANAGEMENT | Facility: CLINIC | Age: 50
End: 2020-09-04

## 2020-09-08 ENCOUNTER — MYC MEDICAL ADVICE (OUTPATIENT)
Dept: FAMILY MEDICINE | Facility: CLINIC | Age: 50
End: 2020-09-08

## 2020-09-09 ENCOUNTER — VIRTUAL VISIT (OUTPATIENT)
Dept: FAMILY MEDICINE | Facility: CLINIC | Age: 50
End: 2020-09-09
Payer: COMMERCIAL

## 2020-09-09 DIAGNOSIS — E03.2 HYPOTHYROIDISM DUE TO MEDICATION: ICD-10-CM

## 2020-09-09 DIAGNOSIS — R73.09 ELEVATED GLUCOSE: ICD-10-CM

## 2020-09-09 DIAGNOSIS — I10 ESSENTIAL HYPERTENSION: ICD-10-CM

## 2020-09-09 PROCEDURE — 99214 OFFICE O/P EST MOD 30 MIN: CPT | Mod: TEL | Performed by: FAMILY MEDICINE

## 2020-09-09 RX ORDER — LEVOTHYROXINE SODIUM 75 UG/1
75 TABLET ORAL DAILY
COMMUNITY
Start: 2020-09-09 | End: 2021-04-28

## 2020-09-09 RX ORDER — LOSARTAN POTASSIUM 25 MG/1
75 TABLET ORAL DAILY
Qty: 270 TABLET | Refills: 1 | Status: SHIPPED | OUTPATIENT
Start: 2020-09-09 | End: 2021-04-12

## 2020-09-09 NOTE — PROGRESS NOTES
"Sinan Rodriguez is a 49 year old male who is being evaluated via a billable telephone visit.      The patient has been notified of following:     \"This telephone visit will be conducted via a call between you and your physician/provider. We have found that certain health care needs can be provided without the need for a physical exam.  This service lets us provide the care you need with a short phone conversation.  If a prescription is necessary we can send it directly to your pharmacy.  If lab work is needed we can place an order for that and you can then stop by our lab to have the test done at a later time.    Telephone visits are billed at different rates depending on your insurance coverage. During this emergency period, for some insurers they may be billed the same as an in-person visit.  Please reach out to your insurance provider with any questions.    If during the course of the call the physician/provider feels a telephone visit is not appropriate, you will not be charged for this service.\"    Patient has given verbal consent for Telephone visit?  Yes    What phone number would you like to be contacted at? [phone]    How would you like to obtain your AVS? Kourtneyharalla Pacheco     Sinan Rodriguez is a 49 year old male who presents via phone visit today for the following health issues:    HPI     Hypertension Follow-up      Do you check your blood pressure regularly outside of the clinic? Yes     Are you following a low salt diet? Yes    Are your blood pressures ever more than 140 on the top number (systolic) OR more   than 90 on the bottom number (diastolic), for example 140/90? No      How many servings of fruits and vegetables do you eat daily?  2-3    On average, how many sweetened beverages do you drink each day (Examples: soda, juice, sweet tea, etc.  Do NOT count diet or artificially sweetened beverages)?   1    How many days per week do you exercise enough to make your heart beat faster? " "7    How many minutes a day do you exercise enough to make your heart beat faster? 60 or more    How many days per week do you miss taking your medication? 0    About 2 weeks ago started taking losartan 25mg TID  Will take it at 8am, 1pm and evening before sleep  Unsure if any side effects -     Recently had bout of fatigue for 4-5 days  Didn't stop him from doing things -   Middle of last week -     Went to see endocrine -   Started levothyroxine 75mcg           Review of Systems   Constitutional, HEENT, cardiovascular, pulmonary, GI, , musculoskeletal, neuro, skin, endocrine and psych systems are negative, except as otherwise noted.       Objective          Vitals:  No vitals were obtained today due to virtual visit.    healthy, alert and no distress  PSYCH: Alert and oriented times 3; coherent speech, normal   rate and volume, able to articulate logical thoughts, able   to abstract reason, no tangential thoughts, no hallucinations   or delusions  His affect is normal  RESP: No cough, no audible wheezing, able to talk in full sentences  Remainder of exam unable to be completed due to telephone visits            Assessment/Plan:    Assessment & Plan     Hypothyroidism due to medication  Saw endocrinologist -   Started on levothyroxine -   Updated chart -  Due to his chemotherapy drugs    Essential hypertension  BP well controlled  Actually taking the med TID  Pt will call or RTC if symptoms worsen or do not improve.   - losartan (COZAAR) 25 MG tablet; Take 3 tablets (75 mg) by mouth daily    Elevated glucose  Plan to check A1C at the end of the October        Tobacco Cessation:   reports that he has been smoking cigars and dip, chew, snus or snuff. He uses smokeless tobacco.        BMI:   Estimated body mass index is 34.72 kg/m  as calculated from the following:    Height as of 2/6/20: 1.905 m (6' 3\").    Weight as of 2/6/20: 126 kg (277 lb 12.8 oz).           Pt will call or RTC if symptoms worsen or do not " improve.      No follow-ups on file.    Jyotsna Mora DO  [facility]    Phone call duration:  14 minutes

## 2020-09-09 NOTE — TELEPHONE ENCOUNTER
PN,    Please see [employer] message.  Patient has appointment with you this am.  Thanks,  Jolynn Weathers RN

## 2020-09-18 ENCOUNTER — TRANSFERRED RECORDS (OUTPATIENT)
Dept: HEALTH INFORMATION MANAGEMENT | Facility: CLINIC | Age: 50
End: 2020-09-18

## 2020-10-08 ENCOUNTER — MYC MEDICAL ADVICE (OUTPATIENT)
Dept: FAMILY MEDICINE | Facility: CLINIC | Age: 50
End: 2020-10-08

## 2020-10-16 ENCOUNTER — TRANSFERRED RECORDS (OUTPATIENT)
Dept: HEALTH INFORMATION MANAGEMENT | Facility: CLINIC | Age: 50
End: 2020-10-16

## 2020-11-20 ENCOUNTER — MYC MEDICAL ADVICE (OUTPATIENT)
Dept: FAMILY MEDICINE | Facility: CLINIC | Age: 50
End: 2020-11-20

## 2020-11-22 ENCOUNTER — HEALTH MAINTENANCE LETTER (OUTPATIENT)
Age: 50
End: 2020-11-22

## 2020-12-11 ENCOUNTER — TRANSFERRED RECORDS (OUTPATIENT)
Dept: HEALTH INFORMATION MANAGEMENT | Facility: CLINIC | Age: 50
End: 2020-12-11

## 2020-12-18 ENCOUNTER — OFFICE VISIT (OUTPATIENT)
Dept: PODIATRY | Facility: CLINIC | Age: 50
End: 2020-12-18
Payer: COMMERCIAL

## 2020-12-18 VITALS
HEIGHT: 75 IN | DIASTOLIC BLOOD PRESSURE: 72 MMHG | WEIGHT: 261 LBS | BODY MASS INDEX: 32.45 KG/M2 | SYSTOLIC BLOOD PRESSURE: 110 MMHG

## 2020-12-18 DIAGNOSIS — L85.9 HYPERKERATOSIS: ICD-10-CM

## 2020-12-18 DIAGNOSIS — L85.3 DRY SKIN: Primary | ICD-10-CM

## 2020-12-18 PROCEDURE — 99203 OFFICE O/P NEW LOW 30 MIN: CPT | Mod: 25 | Performed by: PODIATRIST

## 2020-12-18 PROCEDURE — 11055 PARING/CUTG B9 HYPRKER LES 1: CPT | Performed by: PODIATRIST

## 2020-12-18 RX ORDER — AMMONIUM LACTATE 12 G/100G
CREAM TOPICAL
Qty: 385 G | Refills: 1 | Status: SHIPPED | OUTPATIENT
Start: 2020-12-18 | End: 2022-01-14

## 2020-12-18 ASSESSMENT — MIFFLIN-ST. JEOR: SCORE: 2124.52

## 2020-12-18 NOTE — PROGRESS NOTES
"Foot & Ankle Surgery  December 18, 2020    CC: \"sore on bottom of foot\"; also concerned about dry skin on heels    I was asked to see Sinan Rodriguez regarding the chief complaint by:  self    HPI:  Pt is a 50 year old male who presents with above complaint.  Lesion plantar left foot x 3 months.  \"remove the sore\".  \"pain\", 4/10 \"daily\", worse with \"walking\".  \"creams + ointments\" for treatment.  Insufficient improvement.  Also c/o dry skin on heels.      ROS:   Pos for CC.  The patient denies current nausea, vomiting, chills, fevers, belly pain, calf pain, chest pain or SOB.  Complete remainder of ROS is otherwise neg.    VITALS:  There were no vitals filed for this visit.    PMH:    Past Medical History:   Diagnosis Date     Hypertension        SXHX:    Past Surgical History:   Procedure Laterality Date     COLONOSCOPY N/A 8/23/2019    Procedure: COLONOSCOPY;  Surgeon: Silvano Segovia MD;  Location:  GI     KNEE SURGERY Bilateral     scope multiple - most recent 2014     ORTHOPEDIC SURGERY       STRIP VEIN BILATERAL Bilateral 2015        MEDS:    Current Outpatient Medications   Medication     amLODIPine (NORVASC) 10 MG tablet     aspirin (ASPIRIN ADULT) 325 MG tablet     Cabozantinib S-Malate (CABOMETYX) 60 MG     cyclobenzaprine (FLEXERIL) 10 MG tablet     levothyroxine (SYNTHROID/LEVOTHROID) 75 MCG tablet     losartan (COZAAR) 25 MG tablet     omeprazole (PRILOSEC) 20 MG DR capsule     rosuvastatin (CRESTOR) 20 MG tablet     zolpidem (AMBIEN) 10 MG tablet     No current facility-administered medications for this visit.        ALL:     Allergies   Allergen Reactions     Atorvastatin      Mildly elevated CK     Erythromycin      No reaction to eye drops recently     Iodine      injectable     Phenobarbital        FMH:    Family History   Problem Relation Age of Onset     Cholecystitis Father 67        passed away - gangrene     Cancer Brother         arm - sarcoma in HS       SocHx:    Social History "     Socioeconomic History     Marital status: Single     Spouse name: Not on file     Number of children: Not on file     Years of education: Not on file     Highest education level: Not on file   Occupational History     Not on file   Social Needs     Financial resource strain: Not on file     Food insecurity     Worry: Not on file     Inability: Not on file     Transportation needs     Medical: Not on file     Non-medical: Not on file   Tobacco Use     Smoking status: Current Some Day Smoker     Types: Cigars, Dip, chew, snus or snuff     Smokeless tobacco: Current User   Substance and Sexual Activity     Alcohol use: Yes     Frequency: 2-3 times a week     Drug use: Never     Sexual activity: Yes     Partners: Female   Lifestyle     Physical activity     Days per week: Not on file     Minutes per session: Not on file     Stress: Not on file   Relationships     Social connections     Talks on phone: Not on file     Gets together: Not on file     Attends [employer] service: Not on file     Active member of club or organization: Not on file     Attends meetings of clubs or organizations: Not on file     Relationship status: Not on file     Intimate partner violence     Fear of current or ex partner: Not on file     Emotionally abused: Not on file     Physically abused: Not on file     Forced sexual activity: Not on file   Other Topics Concern     Parent/sibling w/ CABG, MI or angioplasty before 65F 55M? Not Asked   Social History Narrative     Not on file           EXAMINATION:  Gen:   No apparent distress  Neuro:   A&Ox3, no deficits  Psych:    Answering questions appropriately for age and situation with normal affect  Head:    NCAT  Eye:    Visual scanning without deficit  Ear:    Response to auditory stimuli wnl  Lung:    Non-labored breathing on RA noted  Abd:    NTND per patient report  Lymph:    Neg for pitting/non-pitting edema BLE  Vasc:    Pulses palpable, CFT minimally delayed  Neuro:    Light touch  sensation intact to all sensory nerve distributions without paresthesias  Derm:    Large nucleated lesion sub 3rd MPJ L foot.  After debridement, there was a large crater but no open wounds.  No clear evidence of verrucous lesion.  Dry skin on heels, no current wounds/fissures  MSK:    ROM, strength wnl without limitation, no pain on palpation noted.  Calf:    Neg for redness, swelling or tenderness    Assessment:  50 year old male with large nucleated hyperkeratotic lesion sub 3rd MPJ L foot; dry skin on heels      Plan:  Discussed etiologies, anatomy and options  1.  Large nucleated hyperkeratotic lesion sub 3rd MPJ L foot  -We discussed that many hyper-keratotic lesions are caused by pressure or shearing forces on the skin, and these can often be treated sufficiently with shoes, inserts and local tissue debridement.  Some lesions, however, can have a deep core, and while home treatments are helpful, occasional clinical debridement may be necessary.  In certain cases, surgical excision may be required if no other treatments have proven sufficient.  -Our home instruction handout was dispensed, detailing home therapies to minimize regrowth of the hyperkeratotic tissue.    -debrided with 15 blade without incident  -OTC insert; we also discussed option for custom orthotics.  He will consider and call prn for an order  -he asked about a better long-term solution.  We discussed that, as these are often related to abnormal biomechanics, there is an option for a 3rd metatarsal osteotomy +/- excision of the skin lesion and flap closure.  Not indicated right now    2.  Dry skin on heels with occasional fissuring  -Rx for Lac hydrin  -advised wearing shoes with heel counter and minimizing shoeless ambulation, as this can stress the skin and lead to cracking     Follow up:  prn or sooner with acute issues      Patient's medical history was reviewed today      Mike Calix DPM [facility]AC[facility] FACFAOM  Podiatric Foot & Ankle  Surgeon  [facility]  [phone]

## 2020-12-18 NOTE — PATIENT INSTRUCTIONS
Thank you for choosing [facility] Podiatry / Foot & Ankle Surgery!    DR. MENDOZA'S CLINIC LOCATIONS:   [address] [facility] [address]   [place] [address]                        [place] [address]   [phone]      SET UP SURGERY: [phone]   APPOINTMENTS: [phone]   BILLING QUESTIONS: [phone]   FAX NUMBER: [phone]     Follow up: as needed    Next steps: Please follow instructions and follow up as needed    Please read through the following handouts and if you have any questions, please feel free to call us or send a [employer] message!    CALLUS / CORNS / IPKs  When there is excessive friction or pressure on the skin, the body responds by making the skin thicker to protect the deeper structures from becoming exposed. While this works well to protect the deeper structures, the thickened skin can increase pressure and pain.    CALLUS: Flat, diffuse thickening are simple calluses and they are usually caused by friction. Often these are the result of rubbing on a shoe or going barefoot.    CORNS: Calluses with a central core between the toes are called corns. These result from prominent joints on adjacent toes rubbing together. Theses are a symptom of bone malalignment and will always recur unless the underlying bones are addressed surgically.    IPKs: Calluses with a central core on the ball of the foot are usually IPKs (intractable plantar keratosis). These are caused by excessive pressure from the metatarsals, the bones that make up the ball of the foot. Often one of these bones is too long or too prominent.  Again, these will always recur unless the underlying bone issue is addressed. There is no cure for these. They will either go away by themselves, recur, or more could develop.    ROUTINE MAINTENANCE  1. File them down with a pumice stone or  callus file a couple times a week.   2. An electric callus removing device. Amope Pedi Perfect Electronic Pedicure Foot File and Callus Remover can be a good option.   3. Lotion can be applied to soften the callus. A urea based cream such as Kersal or Vanicream or thicker cream with shea butter are good options.  4. Toe spacers or toe covers can be used for corns, gel pads can be used for other lesions on the bottom of the foot.   If there is a surgical pathology noted, such as a prominent bone, often this needs to be addressed surgically to minimize recurrence. However, sometimes the lesion simply migrates to another spot after surgery, so it is not a guaranteed cure.     There was also discussion of the cost structure of callus care if they were to come back and have it treated in the clinic. Explained that if insurance does not cover it, they would be billed. This charge could range from $100 - $160.  They were also provided information on places to get the callus treatment.    OVER THE COUNTER INSERTS    Most of these can be found at your local [employer], [employer], or online:    [employer]   Sofsole Fit [facility]   Power Step   Walk-Fit ([employer])  *For heel pain* Arch Cradles  *For heel pain*       ** A good high quality over the counter insert should cost around $40-$50    ** Capsulitis/Metarasalgia - try adding a metatarsal pad on your inserts or find an insert with one built in        BUNION (HALLUX ABDUCTO VALGUS)  A bunion is caused by muscle imbalance. The great toe is pulled toward the smaller toes. The metatarsal head is pushed outward creating a lump on the side of your foot. Imbalance is the result of foot structure and instability.   Bunions do not improve with time. They usually enlarge, however this is a fairly slow process. Shoes do not necessarily cause bunions, however, they can hasten development and definitely cause bunions to hurt.   Bunions often run in  families. We inherit a certain foot structure, which may be predisposed to bunion development.   Bunion pain is likely a combination of shoes rubbing on the bump, nerve irritation, compression between the toes, joint misalignment, arthritis and altered gait.   SYMPTOMS   Bunions are usually termed mild, moderate or severe. Just because you have a bunion does not mean you have to have pain. There are some people with very severe bunions and no pain and people with mild bunions and a lot of pain.   - Pain on the inside of your foot at the big toe joint (1st MTPJ)   - Swelling on the inside of your foot at the big toe joint   - Redness on the inside of your foot at the big toe joint   - Numbness or burning in the big toe (hallux)   - Decreased motion at the big toe joint   - Painful bursa (fluid-filled sac) on the inside of your foot at the big toe joint   - Pain while wearing shoes -especially shoes too narrow or with high heels    - Pain during activities   - Corn in between the big toe and second toe   - Callous formation on the side or bottom of the big toe or big toe joint   - Callous under the second toe joint (2nd MTPJ)   - Pain in the second toe joint   TREATMENT  Conservative (non-surgical) treatment will not make the bunion go away, but it will hopefully decrease the signs and symptoms you have and help you get rid of the pain and get you back to your activities.   1.  Wider shoes or extra depth shoes: Most bunion pain can be improved simply by wearing compatible shoes. People with bunions cannot choose footwear simply because they like the style. Your bunion should determine which shoes are to be worn. Wide shoes with nonirritating seams,soft leather and a square toe box are most compatible with a bunion. Shoes should fit appropriately right out of the box but may need to be professionally stretched and modified to accommodate the bump. Heels, dress shoes and shoes with pointed toes will not be comfortable.    2. NSAIDs   3.  Arch supports, custom inserts, padding, splints, toe spacers : Most bunion pain can be improved simply by wearing compatible shoes. People with bunions cannot choose footwear simply because they like the style. Your bunion should determine which shoes are to be worn. Wide shoes with nonirritating seams,soft leather and a square toe box are most compatible with a bunion. Shoes should fit appropriately right out of the box but may need to be professionally stretched and modified to accommodate the bump. Heels, dress shoes and shoes with pointed toes will not be comfortable.   4.  Change activities   5.  Physical therapy  SURGERY  Surgical treatment for bunions is sometimes needed. If you are limited by pain, cannot fit in shoes comfortably and are not able to do your daily activities then surgery may be a good option for you. There are many different surgical procedures to repair bunions. Your foot and ankle surgeon will review your foot exam findings, your x-rays, your age, your health, your lifestyle, your physical activity level and discuss with you which procedure he or she would recommend. Surgical procedures for bunions range from soft tissue repair to cutting and realigning the bones. It is not recommended that you have bunion surgery for cosmetic reasons (you do not like how your foot looks) or because you want to fit in a certain pair of shoes; There is the risk that even after surgery, the bunion will reoccur 9-10% of the time.   Bunion surgery involves cutting and repositioning the bones surrounding the bunion. Pins and screws are used to hold the bones in place during the healing process. The goal of bunion surgery is to reduce the size of the bunion bump. Realignment of the toe and joint is attempted.     Some first toes cannot be forced back into normal alignment even with surgery. Surgery is helpful in most cases but does not necessarily create a normal foot.   Healing after surgery  requires about six weeks of protection. This allows the bone to heal. Maximum recovery takes about one year. The scar tissue and jOint structures require this amount of time to finish the healing process. Expect stiffness, swelling and numbness during that time frame. Bunion surgery does involve side effects. Some side effects are predictable and others are less common but do occur. A scar will be visible and could be irritated by shoes. The shoe may rub on the screw or internal pin requiring surgical removal of these fixation devices. The screw and pin would likely be left in place for a full year. The first toe may loose motion after bunion surgery. The amount of stiffness is variable. Some people never regain normal motion of the first toe. This is due to scar tissue inherent to any surgery. The first toe may drift toward the second toe or away from the second toe. Spreading of the first and second toes is a rare occurrence after bunion surgery. This can be quite bothersome and would need to be surgically repaired. Toe drift toward the second toe could result in a recurrent bunion and revision surgery. Joint fusion is one option to correct an unstable, drifting toe. This procedure straightens the toe, however, no motion remains. Fusion may be necessary to correct complications of bunion surgery or as the original procedure in severe cases.   All surgical procedures involve risk of infection, numbness, pain, delayed healing, osteotomy dislocation, blood clots, continued foot pain, etc. Bunion surgery is quite complex and should not be taken lightly.   Any skin incision can lead to infection. Deep infection might involve the bone and thus repeat surgery and six weeks of IV antibiotics. Scar tissue can cause nerve pain or numbness. This is generally temporary but can be permanent. We do not have treatments that cure nerve problems. Second toe pain could be related to altered mechanics and pressure transferred to the  second toe. Most feet with bunions have pre-existing second toe problems. Delayed bone healing would lengthen the healing time. Some bones simply do not heal. This requires repeat surgery, electronic bone stimulation and/or extended protection. Smokers have an approximate 20% chance of poor bone healing. This is double that of a non-smoker. The bone cut may displace. This may need to be repaired with a second operation. Displacement can cause jOint malalignment. Immobility after surgery can cause blood clots in the legs and lungs. This could result in death.   Foot pain is complex. Most feet hurt for more than one reason. Fixing the bunion would not necessarily create a pain free foot. Appropriate shoes, healthy body weight, avoidance of bare foot walking and moderation of activity will always be necessary to enjoy foot comfort. Your bunion may involve arthritis, which is incurable even with surgery. Long standing bunions often involve chronic irritation to the surrounding nerves. Nerve pain may not resolve even with reducing the bunion bump since permanent nerve damage may be present   Bunion surgery is nevertheless quite successful. Most surgical patients are pleased with their foot following bunion surgery. Many of the issues described above can be controlled by taking proper care of your foot during the healing process.   Your surgeon would be happy to fully describe any of the above issues. You should pursue a full understanding of the operation,recovery process and any potential problems that could develop.   PREVENTION  1.  Do not wear high heels if there is a family history of bunions.  2.  Wear shoes that have enough width and depth in the toe box  Here are exercises that may benefit people with bunions:   Toe stretches - Stretching out your toes can help keep them limber and offset foot pain. To stretch your toes, point your toes straight ahead for 5 seconds and then curl them under for 5 seconds. Repeat  these stretches 10 times. These exercises can be especially beneficial if you also have hammertoes, or chronically bent toes, in addition to a bunion.   Toe flexing and kaylen - Press your toes against a hard surface such as a wall, to flex and stretch them; hold the position for 10 seconds and repeat three to four times. Then flex your toes in the opposite direction; hold the position for 10 seconds and repeat three to four times.   Stretching your big toe - Using your fingers to gently pull your big toe over into proper alignment can be helpful as well. Hold your toe in position for 10 seconds and repeat three to four times.   Resistance exercises - Wrap either a towel or belt around your big toe and use it to pull your big toe toward you while simultaneously pushing forward, against the towel, with your big toe.   Ball roll - To massage the bottom of your foot, sit down, place a golf ball on the floor under your foot, and roll it around under your foot for two minutes. This can help relieve foot strain and cramping.   Towel curls - You can strengthen your toes by spreading out a small towel on the floor, curling your toes around it, and pulling it toward you. Repeat five times. Gripping objects with your toes like this can help keep your foot flexible.   Picking up marbles - Another gripping exercise you can perform to keep your foot flexible is picking up marbles with your toes. Do this by placing 20 marbles on the floor in front of you and use your foot to pick the marbles up one by one and place them in a bowl.   Walking along the beach - Whenever possible, spend time walking on sand. This can give you a gentle foot massage and also help strengthen your toes. This is especially beneficial for people who have arthritis associated with their bunions.         (BMI) Body Mass Index  Many things can cause foot and ankle problems. Foot structure, activity level, foot mechanics and injuries are common causes of  pain.One very important issue that often goes unmentioned, is body weight.  Extra weight can cause increased stress on muscles, ligaments, bones and tendons. Sometimes just a few extra pounds is all it takes to put one over her/his threshold. Without reducing that stress, it can be difficult to alleviate pain. Some people are uncomfortable addressing this issue, but we feel it is important for you to think about it. As Foot & Ankle specialists, our job is addressing the lower extremity problem and possible causes. Regarding extra body weight, we encourage patients to discuss diet and weight management plans with their primary care doctors. It is this team approach that gives you the best opportunity for pain relief and getting you back on your feet.

## 2021-02-05 ENCOUNTER — TELEPHONE (OUTPATIENT)
Dept: FAMILY MEDICINE | Facility: CLINIC | Age: 51
End: 2021-02-05

## 2021-02-05 ENCOUNTER — TRANSFERRED RECORDS (OUTPATIENT)
Dept: HEALTH INFORMATION MANAGEMENT | Facility: CLINIC | Age: 51
End: 2021-02-05

## 2021-02-05 NOTE — TELEPHONE ENCOUNTER
Please let her know that I am OK with stopping the crestor   We should plan to recheck his lipids in around 2-3 months.    Thanks  PN

## 2021-02-05 NOTE — TELEPHONE ENCOUNTER
PN,  Bere, Dr. Simpson's nurse, in oncology calling wanting to update you that he stopped pt's Crestor due to persistantly elevated ALT.  Bere is calling wanting to make sure you are ok with it too.  Please advise.  Thanks,  Gemini Arceo RN

## 2021-02-08 NOTE — TELEPHONE ENCOUNTER
Attempt to call Bere at [facility], on hold x 5+ minutes.    Will try to call later.  Jolynn Weathers RN

## 2021-02-18 ENCOUNTER — TRANSFERRED RECORDS (OUTPATIENT)
Dept: HEALTH INFORMATION MANAGEMENT | Facility: CLINIC | Age: 51
End: 2021-02-18

## 2021-03-12 ENCOUNTER — IMMUNIZATION (OUTPATIENT)
Dept: NURSING | Facility: CLINIC | Age: 51
End: 2021-03-12
Payer: COMMERCIAL

## 2021-03-12 PROCEDURE — 91300 PR COVID VAC PFIZER DIL RECON 30 MCG/0.3 ML IM: CPT

## 2021-03-12 PROCEDURE — 0001A PR COVID VAC PFIZER DIL RECON 30 MCG/0.3 ML IM: CPT

## 2021-03-26 ENCOUNTER — TRANSFERRED RECORDS (OUTPATIENT)
Dept: HEALTH INFORMATION MANAGEMENT | Facility: CLINIC | Age: 51
End: 2021-03-26

## 2021-04-02 ENCOUNTER — IMMUNIZATION (OUTPATIENT)
Dept: NURSING | Facility: CLINIC | Age: 51
End: 2021-04-02
Attending: INTERNAL MEDICINE
Payer: COMMERCIAL

## 2021-04-02 PROCEDURE — 91300 PR COVID VAC PFIZER DIL RECON 30 MCG/0.3 ML IM: CPT

## 2021-04-02 PROCEDURE — 0002A PR COVID VAC PFIZER DIL RECON 30 MCG/0.3 ML IM: CPT

## 2021-04-23 ENCOUNTER — TRANSFERRED RECORDS (OUTPATIENT)
Dept: HEALTH INFORMATION MANAGEMENT | Facility: CLINIC | Age: 51
End: 2021-04-23

## 2021-04-26 ENCOUNTER — MYC MEDICAL ADVICE (OUTPATIENT)
Dept: PODIATRY | Facility: CLINIC | Age: 51
End: 2021-04-26

## 2021-04-27 ENCOUNTER — OFFICE VISIT (OUTPATIENT)
Dept: PODIATRY | Facility: CLINIC | Age: 51
End: 2021-04-27
Payer: COMMERCIAL

## 2021-04-27 VITALS
DIASTOLIC BLOOD PRESSURE: 70 MMHG | BODY MASS INDEX: 34.47 KG/M2 | SYSTOLIC BLOOD PRESSURE: 122 MMHG | WEIGHT: 277.2 LBS | HEIGHT: 75 IN

## 2021-04-27 DIAGNOSIS — L85.9 HYPERKERATOSIS: ICD-10-CM

## 2021-04-27 DIAGNOSIS — L98.9 SKIN LESION OF FOOT: Primary | ICD-10-CM

## 2021-04-27 PROCEDURE — 99213 OFFICE O/P EST LOW 20 MIN: CPT | Performed by: PODIATRIST

## 2021-04-27 ASSESSMENT — MIFFLIN-ST. JEOR: SCORE: 2198

## 2021-04-27 NOTE — TELEPHONE ENCOUNTER
Patient seen in clinic today, discussed with  and patient    Ne Cheney, ATC    4/27/2021 at 1:57 PM

## 2021-04-27 NOTE — PATIENT INSTRUCTIONS
Thank you for choosing [facility] Podiatry / Foot & Ankle Surgery!    DR MENDOZA'S CLINIC LOCATIONS  [facility]   [address] [facility] [address]   [place] [address] [place], [place] [id number] [phone]       SET UP SURGERY: [phone]    APPOINTMENTS: [phone]    BILLING QUESTIONS: [phone]    FAX NUMBER: [phone]        CALLUS / CORNS / IPKs  When there is excessive friction or pressure on the skin, the body responds by making the skin thicker to protect the deeper structures from becoming exposed. While this works well to protect the deeper structures, the thickened skin can increase pressure and pain.    CALLUS: Flat, diffuse thickening are simple calluses and they are usually caused by friction. Often these are the result of rubbing on a shoe or going barefoot.    CORNS: Calluses with a central core between the toes are called corns. These result from prominent joints on adjacent toes rubbing together. Theses are a symptom of bone malalignment and will always recur unless the underlying bones are addressed surgically.    IPKs: Calluses with a central core on the ball of the foot are usually IPKs (intractable plantar keratosis). These are caused by excessive pressure from the metatarsals, the bones that make up the ball of the foot. Often one of these bones is too long or too prominent.  Again, these will always recur unless the underlying bone issue is addressed. There is no cure for these. They will either go away by themselves, recur, or more could develop.    ROUTINE MAINTENANCE  1. File them down with a pumice stone or callus file a couple times a week.   2. An electric callus removing device. Amope Pedi Perfect Electronic Pedicure Foot File and Callus Remover can be a good option.   3. Lotion can be applied to soften the callus. A urea based cream such as Kersal or Vanicream or thicker cream with shea butter are good options.  4. Toe  spacers or toe covers can be used for corns, gel pads can be used for other lesions on the bottom of the foot.   If there is a surgical pathology noted, such as a prominent bone, often this needs to be addressed surgically to minimize recurrence. However, sometimes the lesion simply migrates to another spot after surgery, so it is not a guaranteed cure.     There was also discussion of the cost structure of callus care if they were to come back and have it treated in the clinic. Explained that if insurance does not cover it, they would be billed. This charge could range from $100 - $160.  They were also provided information on places to get the callus treatment.

## 2021-04-27 NOTE — PROGRESS NOTES
SUBJECTIVE:   CC: Sinan Rodriguez is an 50 year old male who presents for preventative health visit.       Patient has been advised of split billing requirements and indicates understanding: Yes  Healthy Habits:     Getting at least 3 servings of Calcium per day:  Yes    Bi-annual eye exam:  Yes    Dental care twice a year:  Yes    Sleep apnea or symptoms of sleep apnea:  None    Diet:  Regular (no restrictions)    Frequency of exercise:  4-5 days/week    Duration of exercise:  45-60 minutes    Taking medications regularly:  Yes    Medication side effects:  None    PHQ-2 Total Score: 0    Additional concerns today:  No          -------------------------------------    Today's PHQ-2 Score:   PHQ-2 ( 1999 [employer]) 4/28/2021   Q1: Little interest or pleasure in doing things 0   Q2: Feeling down, depressed or hopeless 0   PHQ-2 Score 0   Q1: Little interest or pleasure in doing things Not at all   Q2: Feeling down, depressed or hopeless Not at all   PHQ-2 Score 0       Abuse: Current or Past(Physical, Sexual or Emotional)-   Do you feel safe in your environment? Yes        Social History     Tobacco Use     Smoking status: Former Smoker     Smokeless tobacco: Former User   Substance Use Topics     Alcohol use: Yes     Frequency: 2-3 times a week     If you drink alcohol do you typically have >3 drinks per day or >7 drinks per week? No    Alcohol Use 4/28/2021   Prescreen: >3 drinks/day or >7 drinks/week? No   Prescreen: >3 drinks/day or >7 drinks/week? -   No flowsheet data found.    Last PSA:   PSA   Date Value Ref Range Status   04/28/2021 2.41 0 - 4 ug/L Final     Comment:     Assay Method:  Chemiluminescence using Siemens Vista analyzer       Reviewed orders with patient. Reviewed health maintenance and updated orders accordingly - Yes  Patient Active Problem List   Diagnosis     Chondromalacia of patella     Knee pain     Low back pain     Essential hypertension     Clot     Mixed hyperlipidemia     Gastroesophageal  reflux disease without esophagitis     Other insomnia     Lumbar herniated disc     Ineffective esophageal motility     Obesity, unspecified     Prediabetes     Cholelithiasis     Varicose veins of other specified sites     Metastatic renal cell carcinoma to bone (H)     Hypothyroidism due to medication     Elevated glucose     Past Surgical History:   Procedure Laterality Date     COLONOSCOPY N/A 8/23/2019    Procedure: COLONOSCOPY;  Surgeon: Silvano Segovia MD;  Location:  GI     KNEE SURGERY Bilateral     scope multiple - most recent 2014     ORTHOPEDIC SURGERY       STRIP VEIN BILATERAL Bilateral 2015       Social History     Tobacco Use     Smoking status: Former Smoker     Smokeless tobacco: Former User   Substance Use Topics     Alcohol use: Yes     Frequency: 2-3 times a week     Family History   Problem Relation Age of Onset     Cholecystitis Father 67        passed away - gangrene     Cancer Brother         arm - sarcoma in HS           Reviewed and updated as needed this visit by clinical staff  Tobacco  Allergies  Meds  Problems  Med Hx  Surg Hx  Fam Hx          Reviewed and updated as needed this visit by Provider  Tobacco  Allergies  Meds  Problems  Med Hx  Surg Hx  Fam Hx             Review of Systems  CONSTITUTIONAL: NEGATIVE for fever, chills, change in weight  INTEGUMENTARY/SKIN: NEGATIVE for worrisome rashes, moles or lesions  EYES: NEGATIVE for vision changes or irritation  ENT: NEGATIVE for ear, mouth and throat problems  RESP: NEGATIVE for significant cough or SOB  CV: NEGATIVE for chest pain, palpitations or peripheral edema  GI: NEGATIVE for nausea, abdominal pain, heartburn, or change in bowel habits   male: negative for dysuria, hematuria, decreased urinary stream, erectile dysfunction, urethral discharge  MUSCULOSKELETAL: NEGATIVE for significant arthralgias or myalgia  NEURO: NEGATIVE for weakness, dizziness or paresthesias  PSYCHIATRIC: NEGATIVE for changes in  "mood or affect    OBJECTIVE:   /73   Pulse 74   Resp 16   Ht 1.905 m (6' 3\")   Wt 123.1 kg (271 lb 4.8 oz)   SpO2 99%   BMI 33.91 kg/m      Physical Exam  GENERAL: healthy, alert and no distress  EYES: Eyes grossly normal to inspection, PERRL and conjunctivae and sclerae normal  HENT: ear canals and TM's normal,   NECK: no adenopathy, no asymmetry, masses, or scars and thyroid normal to palpation  RESP: lungs clear to auscultation - no rales, rhonchi or wheezes  CV: regular rate and rhythm, normal S1 S2, no S3 or S4, no murmur, click or rub, no peripheral edema and peripheral pulses strong  ABDOMEN: soft, nontender, no hepatosplenomegaly, no masses and bowel sounds normal  MS: no gross musculoskeletal defects noted, no edema  SKIN: no suspicious lesions or rashes  NEURO: Normal strength and tone, mentation intact and speech normal  PSYCH: mentation appears normal, affect normal/bright    Diagnostic Test Results:  Labs reviewed in Epic  Results for orders placed or performed in visit on 04/28/21 (from the past 24 hour(s))   Hemoglobin A1c   Result Value Ref Range    Hemoglobin A1C 5.8 (H) 0 - 5.6 %   HIV Antigen Antibody Combo   Result Value Ref Range    HIV Antigen Antibody Combo Nonreactive NR^Nonreactive       Hepatitis C antibody   Result Value Ref Range    Hepatitis C Antibody Nonreactive NR^Nonreactive   PSA, screen   Result Value Ref Range    PSA 2.41 0 - 4 ug/L       ASSESSMENT/PLAN:   1. Routine general medical examination at a health care facility       2. Elevated glucose  Borderline glucose   Will [id number]  - Hemoglobin A1c    3. Essential hypertension  BP well controlled - refilled meds   - losartan (COZAAR) 25 MG tablet; Take 3 tablets (75 mg) by mouth daily  Dispense: 270 tablet; Refill: 3  - amLODIPine (NORVASC) 10 MG tablet; Take 1 tablet (10 mg) by mouth daily  Dispense: 90 tablet; Refill: 3    4. Mixed hyperlipidemia   refilled - stable -  Had elevated LFT this past year but due to " "chemo drug from oncologist  - rosuvastatin (CRESTOR) 20 MG tablet; Take 1 tablet (20 mg) by mouth daily  Dispense: 90 tablet; Refill: 3    5. Routine screening for STI (sexually transmitted infection)     - NEISSERIA GONORRHOEA PCR  - CHLAMYDIA TRACHOMATIS PCR  - HIV Antigen Antibody Combo  - Hepatitis C antibody  - Treponema Abs w Reflex to RPR and Titer    6. Screening for prostate cancer     - PSA, screen    Patient has been advised of split billing requirements and indicates understanding: Yes  COUNSELING:   Reviewed preventive health counseling, as reflected in patient instructions    Estimated body mass index is 33.91 kg/m  as calculated from the following:    Height as of this encounter: 1.905 m (6' 3\").    Weight as of this encounter: 123.1 kg (271 lb 4.8 oz).     Weight management plan: Discussed healthy diet and exercise guidelines    He reports that he has quit smoking. He has quit using smokeless tobacco.      Counseling Resources:  ATP IV Guidelines  Pooled Cohorts Equation Calculator  FRAX Risk Assessment  ICSI Preventive Guidelines  Dietary Guidelines for Americans, 2010  USDA's MyPlate  ASA Prophylaxis  Lung CA Screening    Jyotsna Mora, [facility]  "

## 2021-04-27 NOTE — PROGRESS NOTES
"Foot & Ankle Surgery   April 27, 2021    S:  Pt is seen today for evaluation of painful hyperkeratotic lesion plantar left third MP joint.  I saw him for this 12/18/2020.  He recently had sent a MDdatacort request for additional diagnosis codes to be added to his previous and today's office visit to help facilitate insurance coverage for the procedure.  He states he has done some of the home therapies intermittently, but did not do the pumice stone therapy.  He recently got a pumice stone to utilize.    Vitals:    04/27/21 0937   BP: 122/70   Weight: 125.7 kg (277 lb 3.2 oz)   Height: 1.905 m (6' 3\")   '      ROS - Pos for CC.  Patient denies current nausea, vomiting, chills, fevers, belly pain, calf pain, chest pain or SOB.  Complete remainder of ROS it otherwise neg.    PE:  Gen:   No apparent distress  Eye:    Visual scanning without deficit  Ear:    Response to auditory stimuli wnl  Lung:    Non-labored breathing on RA noted  Abd:    NTND per patient report  Lymph:    Neg for pitting/non-pitting edema BLE  Vasc:    Pulses palpable, CFT minimally delayed  Neuro:    Light touch sensation intact to all sensory nerve distributions without paresthesias  Derm:    Left lower extremity -large nucleated hyperkeratotic lesion subthird MP joint of the left foot without verrucous changes  MSK:    ROM, strength wnl without limitation, no pain on palpation noted.  Calf:    Neg for redness, swelling or tenderness    Assessment:  50 year old male with prominent nucleated hyperkeratotic lesion subthird MP joint left foot      Plan:  Discussed etiologies, anatomy and options  1.  Prominent nucleated hyperkeratotic lesion subthird MP joint left foot, very painful  -We discussed that many hyper-keratotic lesions are caused by pressure or shearing forces on the skin, and these can often be treated sufficiently with shoes, inserts and local tissue debridement.  Some lesions, however, can have a deep core, and while home treatments are " helpful, occasional clinical debridement may be necessary.  In certain cases, surgical excision may be required if no other treatments have proven sufficient.  -Our home instruction handout was dispensed, detailing home therapies to minimize regrowth of the hyperkeratotic tissue.    -we discussed a rough estimate of the cost of debridement in clinic, and how insurance may not cover the cost meaning the patient may be responsible.    -Debrided with 15 blade without incident; no charge  -The diagnosis codes he was hoping to have added are related more so to varicose veins.  As I am not treating him for that, nor does that have any impact on the issue we are treating, I will not be adding that to his visits.  Patient understands    Follow up: As needed or sooner with acute issues           Mike Calix DPM FACFAS FACFAOM  Podiatric Foot & Ankle Surgeon  [facility]  [phone]

## 2021-04-28 ENCOUNTER — MYC MEDICAL ADVICE (OUTPATIENT)
Dept: FAMILY MEDICINE | Facility: CLINIC | Age: 51
End: 2021-04-28

## 2021-04-28 ENCOUNTER — OFFICE VISIT (OUTPATIENT)
Dept: FAMILY MEDICINE | Facility: CLINIC | Age: 51
End: 2021-04-28
Payer: COMMERCIAL

## 2021-04-28 VITALS
OXYGEN SATURATION: 99 % | HEIGHT: 75 IN | BODY MASS INDEX: 33.73 KG/M2 | RESPIRATION RATE: 16 BRPM | DIASTOLIC BLOOD PRESSURE: 73 MMHG | SYSTOLIC BLOOD PRESSURE: 105 MMHG | WEIGHT: 271.3 LBS | HEART RATE: 74 BPM

## 2021-04-28 DIAGNOSIS — C64.9 METASTATIC RENAL CELL CARCINOMA TO BONE (H): ICD-10-CM

## 2021-04-28 DIAGNOSIS — I10 ESSENTIAL HYPERTENSION: ICD-10-CM

## 2021-04-28 DIAGNOSIS — E78.2 MIXED HYPERLIPIDEMIA: ICD-10-CM

## 2021-04-28 DIAGNOSIS — R73.09 ELEVATED GLUCOSE: ICD-10-CM

## 2021-04-28 DIAGNOSIS — Z11.3 ROUTINE SCREENING FOR STI (SEXUALLY TRANSMITTED INFECTION): ICD-10-CM

## 2021-04-28 DIAGNOSIS — C79.51 METASTATIC RENAL CELL CARCINOMA TO BONE (H): ICD-10-CM

## 2021-04-28 DIAGNOSIS — Z12.5 SCREENING FOR PROSTATE CANCER: ICD-10-CM

## 2021-04-28 DIAGNOSIS — Z00.00 ROUTINE GENERAL MEDICAL EXAMINATION AT A HEALTH CARE FACILITY: Primary | ICD-10-CM

## 2021-04-28 LAB
HBA1C MFR BLD: 5.8 % (ref 0–5.6)
HCV AB SERPL QL IA: NONREACTIVE
HIV 1+2 AB+HIV1 P24 AG SERPL QL IA: NONREACTIVE
PSA SERPL-ACNC: 2.41 UG/L (ref 0–4)
T PALLIDUM AB SER QL: NONREACTIVE

## 2021-04-28 PROCEDURE — 99396 PREV VISIT EST AGE 40-64: CPT | Performed by: FAMILY MEDICINE

## 2021-04-28 PROCEDURE — 87491 CHLMYD TRACH DNA AMP PROBE: CPT | Performed by: FAMILY MEDICINE

## 2021-04-28 PROCEDURE — G0103 PSA SCREENING: HCPCS | Performed by: FAMILY MEDICINE

## 2021-04-28 PROCEDURE — 87389 HIV-1 AG W/HIV-1&-2 AB AG IA: CPT | Performed by: FAMILY MEDICINE

## 2021-04-28 PROCEDURE — 99000 SPECIMEN HANDLING OFFICE-LAB: CPT | Performed by: FAMILY MEDICINE

## 2021-04-28 PROCEDURE — 87591 N.GONORRHOEAE DNA AMP PROB: CPT | Performed by: FAMILY MEDICINE

## 2021-04-28 PROCEDURE — 86780 TREPONEMA PALLIDUM: CPT | Mod: 90 | Performed by: FAMILY MEDICINE

## 2021-04-28 PROCEDURE — 86803 HEPATITIS C AB TEST: CPT | Performed by: FAMILY MEDICINE

## 2021-04-28 PROCEDURE — 36415 COLL VENOUS BLD VENIPUNCTURE: CPT | Performed by: FAMILY MEDICINE

## 2021-04-28 PROCEDURE — 83036 HEMOGLOBIN GLYCOSYLATED A1C: CPT | Performed by: FAMILY MEDICINE

## 2021-04-28 RX ORDER — ROSUVASTATIN CALCIUM 20 MG/1
20 TABLET, COATED ORAL DAILY
Qty: 90 TABLET | Refills: 3 | Status: SHIPPED | OUTPATIENT
Start: 2021-04-28 | End: 2022-04-13

## 2021-04-28 RX ORDER — LOSARTAN POTASSIUM 25 MG/1
75 TABLET ORAL DAILY
Qty: 270 TABLET | Refills: 3 | Status: SHIPPED | OUTPATIENT
Start: 2021-04-28 | End: 2022-08-10

## 2021-04-28 RX ORDER — LEVOTHYROXINE SODIUM 50 UG/1
50 TABLET ORAL DAILY
COMMUNITY
Start: 2021-04-28

## 2021-04-28 RX ORDER — AMLODIPINE BESYLATE 10 MG/1
10 TABLET ORAL DAILY
Qty: 90 TABLET | Refills: 3 | Status: SHIPPED | OUTPATIENT
Start: 2021-04-28 | End: 2022-08-10

## 2021-04-28 ASSESSMENT — MIFFLIN-ST. JEOR: SCORE: 2171.24

## 2021-04-28 NOTE — TELEPHONE ENCOUNTER
Team,    Please see Mychart message below.  Patient saw PN today.    Thanks,  Jolynn Weathers RN

## 2021-05-11 NOTE — RESULT ENCOUNTER NOTE
Dear Sinan,   Your test results are all back -   -All of your labs are normal and A1c is better.   Let us know if you have any questions.  -Jyotsna Mora, DO

## 2021-05-19 ENCOUNTER — TRANSFERRED RECORDS (OUTPATIENT)
Dept: HEALTH INFORMATION MANAGEMENT | Facility: CLINIC | Age: 51
End: 2021-05-19

## 2021-06-18 ENCOUNTER — TRANSFERRED RECORDS (OUTPATIENT)
Dept: HEALTH INFORMATION MANAGEMENT | Facility: CLINIC | Age: 51
End: 2021-06-18

## 2021-06-29 ENCOUNTER — APPOINTMENT (OUTPATIENT)
Dept: GENERAL RADIOLOGY | Facility: CLINIC | Age: 51
End: 2021-06-29
Attending: EMERGENCY MEDICINE
Payer: COMMERCIAL

## 2021-06-29 ENCOUNTER — HOSPITAL ENCOUNTER (EMERGENCY)
Facility: CLINIC | Age: 51
Discharge: HOME | End: 2021-06-29
Attending: EMERGENCY MEDICINE | Admitting: EMERGENCY MEDICINE
Payer: COMMERCIAL

## 2021-06-29 VITALS
RESPIRATION RATE: 18 BRPM | SYSTOLIC BLOOD PRESSURE: 127 MMHG | DIASTOLIC BLOOD PRESSURE: 72 MMHG | OXYGEN SATURATION: 98 % | WEIGHT: 281 LBS | HEART RATE: 67 BPM | TEMPERATURE: 96.9 F | BODY MASS INDEX: 35.12 KG/M2

## 2021-06-29 DIAGNOSIS — S90.30XA CONTUSION OF DORSUM OF FOOT: ICD-10-CM

## 2021-06-29 PROCEDURE — 73630 X-RAY EXAM OF FOOT: CPT | Mod: RT

## 2021-06-29 PROCEDURE — 99283 EMERGENCY DEPT VISIT LOW MDM: CPT

## 2021-06-29 ASSESSMENT — ENCOUNTER SYMPTOMS
JOINT SWELLING: 0
FEVER: 0
COLOR CHANGE: 1
NUMBNESS: 0

## 2021-06-29 NOTE — ED PROVIDER NOTES
History   Chief Complaint:  Foot Pain    The history is provided by the patient.      Sinan Rodriguez is a 50 year old male with history of stage IIII renal cancer, hypertension, and obesity who presents with foot pain. The patient reports that his 35 lb dog stepped on his right foot yesterday, and woke up this morning to some pain and bruising in this location. The patient denies any other pain or symptoms. Reports a history of blood clots.     Review of Systems   Constitutional: Negative for fever.   Musculoskeletal: Negative for joint swelling.   Skin: Positive for color change (bruising).   Neurological: Negative for numbness.         Allergies:  Atorvastatin  Erythromycin  Iodine  Phenobarbital    Medications:  Norvasc  Cabometyx  Flexeril  Synthroid  Cozaar  Opdivo IV  Prilosec  Crestor  Ambien     Past Medical History:    Renal cancer stage IIII  Hypertension  Hypothyroidism due to medication  Metastatic renal cell carcinoma to bone  Clot  Mixed hyperlipidemia  GERD  Insomnia  Lumbar herniated disc  Prediabetes  Cholelithiasis  Chondromalacia of patella  Varicose veins  Obesity     Past Surgical History:    Hip replacement  Colonoscopy  Knee surgery  Strop vein bilateral  Stress echo  Esophagoscopy / EGD  Esophageal manometry  CT guided biopsy of right 6th rib     Family History:    Father: cholecystitis  Brother: sarcoma     Social History:  The patient presents to the ED alone.     Physical Exam     Patient Vitals for the past 24 hrs:   BP Temp Temp src Pulse Resp SpO2 Weight   06/29/21 1146 127/72 96.9  F (36.1  C) Temporal 67 18 98 % 127.5 kg (281 lb)       Physical Exam  General: Alert, interactive in mild distress  Head:  Scalp is atraumatic  Eyes:  The pupils are equal, round, and reactive to light    EOM's intact    No scleral icterus  ENT:      Nose:  The external nose is normal  Ears:  External ears are normal  Mouth/Throat: The oropharynx is normal    Mucus membranes are moist      Neck:  Normal  range of motion.      There is no rigidity.    Trachea is in the midline         CV:  Regular rate and rhythm    No murmur     2+ DP pulse on the right  Resp:  Breath sounds are clear bilaterally    Non-labored, no retractions or accessory muscle use     MS:  Normal strength in all 4 extremities  Skin:  Warm and dry, No rash or lesions noted. Ecchymosis over the second and third MTP joints on the dorsum of the right foot   Neuro: Strength 5/5 x4.  Sensation intact  RLE.        Psych:  Awake. Alert.  Normal affect.      Appropriate interactions.    Emergency Department Course     Imaging:  XR foot right 3 views  Prominent plantar calcaneal spur. Otherwise unremarkable  foot radiographs.  As per radiology.    Emergency Department Course:    Reviewed:  I reviewed nursing notes, vitals, past medical history and care everywhere    Assessments:  1225 I obtained history and examined the patient as noted above.     Disposition:  The patient was discharged to home.     Impression & Plan     Medical Decision Making:  Following presentation history and physical examination were performed, radiograph was undertaken demonstrating no signs of fracture or dislocation.  Findings are consistent with a contusion to the foot.  There is no signs of arterial or venous occlusion, infectious etiology, or more concerning illness.  Patient will use Tylenol for pain I recommended ice for comfort and elevation, he will return if new symptoms develop.    Diagnosis:    ICD-10-CM    1. Contusion of dorsum of foot  S90.30XA        Discharge Medications:  None    Scribe Disclosure:  Joesph CAMPOVERDE, am serving as a scribe at 12:25 PM on 6/29/2021 to document services personally performed by Joesph Grimm MD based on my observations and the provider's statements to me.            Joesph Grimm MD  06/29/21 3839

## 2021-06-29 NOTE — ED TRIAGE NOTES
"Pt here from home for R foot pain. Pt states his dog stepped on his R foot yesterday and \" I woke up today and my foot is black and blue\". Pt Aox4. Ambulating independently in triage. VSS.  "

## 2021-07-16 ENCOUNTER — TRANSFERRED RECORDS (OUTPATIENT)
Dept: HEALTH INFORMATION MANAGEMENT | Facility: CLINIC | Age: 51
End: 2021-07-16
Payer: COMMERCIAL

## 2021-07-17 ENCOUNTER — HOSPITAL ENCOUNTER (EMERGENCY)
Facility: CLINIC | Age: 51
Discharge: HOME | End: 2021-07-17
Attending: EMERGENCY MEDICINE | Admitting: EMERGENCY MEDICINE
Payer: COMMERCIAL

## 2021-07-17 VITALS
HEART RATE: 88 BPM | OXYGEN SATURATION: 98 % | DIASTOLIC BLOOD PRESSURE: 78 MMHG | SYSTOLIC BLOOD PRESSURE: 119 MMHG | RESPIRATION RATE: 16 BRPM | TEMPERATURE: 98.6 F

## 2021-07-17 DIAGNOSIS — T78.2XXA ANAPHYLAXIS, INITIAL ENCOUNTER: ICD-10-CM

## 2021-07-17 DIAGNOSIS — L50.9 URTICARIA: ICD-10-CM

## 2021-07-17 PROCEDURE — 250N000013 HC RX MED GY IP 250 OP 250 PS 637: Performed by: EMERGENCY MEDICINE

## 2021-07-17 PROCEDURE — 99284 EMERGENCY DEPT VISIT MOD MDM: CPT | Mod: 25

## 2021-07-17 PROCEDURE — 250N000009 HC RX 250

## 2021-07-17 PROCEDURE — 96372 THER/PROPH/DIAG INJ SC/IM: CPT

## 2021-07-17 PROCEDURE — 250N000012 HC RX MED GY IP 250 OP 636 PS 637: Performed by: EMERGENCY MEDICINE

## 2021-07-17 RX ORDER — FAMOTIDINE 20 MG/1
20 TABLET, FILM COATED ORAL ONCE
Status: COMPLETED | OUTPATIENT
Start: 2021-07-17 | End: 2021-07-17

## 2021-07-17 RX ORDER — LIDOCAINE 40 MG/G
CREAM TOPICAL
Status: DISCONTINUED | OUTPATIENT
Start: 2021-07-17 | End: 2021-07-18 | Stop reason: HOSPADM

## 2021-07-17 RX ORDER — EPINEPHRINE 0.3 MG/.3ML
0.3 INJECTION SUBCUTANEOUS
Qty: 1 EACH | Refills: 0 | Status: SHIPPED | OUTPATIENT
Start: 2021-07-17 | End: 2023-09-29

## 2021-07-17 RX ORDER — PREDNISONE 20 MG/1
40 TABLET ORAL ONCE
Status: COMPLETED | OUTPATIENT
Start: 2021-07-17 | End: 2021-07-17

## 2021-07-17 RX ORDER — PREDNISONE 20 MG/1
TABLET ORAL
Qty: 10 TABLET | Refills: 0 | Status: SHIPPED | OUTPATIENT
Start: 2021-07-17 | End: 2022-01-14

## 2021-07-17 RX ADMIN — PREDNISONE 40 MG: 20 TABLET ORAL at 19:35

## 2021-07-17 RX ADMIN — FAMOTIDINE 20 MG: 20 TABLET ORAL at 19:35

## 2021-07-17 RX ADMIN — EPINEPHRINE 0.5 MG: 1 INJECTION INTRAMUSCULAR; INTRAVENOUS; SUBCUTANEOUS at 19:27

## 2021-07-17 ASSESSMENT — ENCOUNTER SYMPTOMS
TROUBLE SWALLOWING: 1
NAUSEA: 0
VOMITING: 0
LIGHT-HEADEDNESS: 0

## 2021-07-18 NOTE — DISCHARGE INSTRUCTIONS
*You may resume diet and activities as tolerated.  Avoid known allergens.  *Take medications as prescribed.  Prednisone and pepcid as directed.  Benadryl or another nonsedating antihistamine like claritin or zytec as directed as needed. Continue your current medications.  *If you develop symptoms of anaphylaxis (throat swelling, cough, difficulty in breathing, ligthheaded), inject with your Epi-pen and return to the emergency department immediately.  *Return if you become worse in any way.    Discharge Instructions  Allergic Reaction    An allergic reaction can result in a rash, itching, swelling, watery eyes, or a runny nose. A serious reaction can cause swelling of your mouth or throat, or wheezing. The most serious allergy is called analphylaxis, and can be life-threatening. Many allergies result in hives, also called urticaria.     An allergy happens when the body s natural defense system (immune system) overreacts to something harmless. The thing that triggers your allergic reaction is called an allergen. The first time you are exposed to your allergen, you may not have any reaction, but the body makes a protein called an antibody. The antibody lets the body recognize and remember the allergen.  Every time you are exposed to your allergen you get more antibody and your reaction can be more severe.      Call 911 if you have:  Swelling of the lips, tongue or throat.  Hoarse voice, drooling or trouble breathing.  Chest pain or shortness of breath.  Fainting or unconsciousness.    Return to the Emergency Department if:  You develop a fever.  You have significant abdominal pain.  You vomit more than once.  Your rash changes or looks very different.    What can I do to help myself?  If you know what caused your allergy, don t touch it, throw any of it away, and tell others not to have it around you. Wear a medical alert bracelet with a name of your allergen on it.  If you don t know what you are allergic to, keep a  journal of everything that you are exposed to (foods, soaps, medicines, etc.). Take this with you when you follow up with your primary doctor. This may help determine what is causing the allergic reaction.  Take any medicines that are prescribed.  Antihistamines can decrease rash or itching. You may use Benadryl  (diphenhydramine) for rash or itching according to package directions, or use a prescription antihistamine as recommended by your physician.  For significant allergic reactions, you may have been given an epinephrine (adrenaline) auto injector (EpiPen ). Carry this with you at all times! Use it if you are having any symptoms of anaphylaxis.  Do not be afraid to use it. Always call 911 if you use your EpiPen ! It is only meant to buy time until you can get to the Emergency Department!  If you were given a prescription for medicine here today, be sure to read all of the information (including the package insert) that comes with your prescription.  This will include important information about the medicine, its side effects, and any warnings that you need to know about.  The pharmacist who fills the prescription can provide more information and answer questions you may have about the medicine.  If you have questions or concerns that the pharmacist cannot address, please call or return to the Emergency Department.     Opioid Medication Information    Pain medications are among the most commonly prescribed medicines, so we are including this information for all our patients. If you did not receive pain medication or get a prescription for pain medicine, you can ignore it.     You may have been given a prescription for an opioid (narcotic) pain medicine and/or have received a pain medicine while here in the Emergency Department. These medicines can make you drowsy or impaired. You must not drive, operate dangerous equipment, or engage in any other dangerous activities while taking these medications. If you drive  while taking these medications, you could be arrested for DUI, or driving under the influence. Do not drink any alcohol while you are taking these medications.     Opioid pain medications can cause addiction. If you have a history of chemical dependency of any type, you are at a higher risk of becoming addicted to pain medications.  Only take these prescribed medications to treat your pain when all other options have been tried. Take it for as short a time and as few doses as possible. Store your pain pills in a secure place, as they are frequently stolen and provide a dangerous opportunity for children or visitors in your house to start abusing these powerful medications. We will not replace any lost or stolen medicine.  As soon as your pain is better, you should flush all your remaining medication.     Many prescription pain medications contain Tylenol  (acetaminophen), including Vicodin , Tylenol #3 , Norco , Lortab , and Percocet .  You should not take any extra pills of Tylenol  if you are using these prescription medications or you can get very sick.  Do not ever take more than 3000 mg of acetaminophen in any 24 hour period.    All opioids tend to cause constipation. Drink plenty of water and eat foods that have a lot of fiber, such as fruits, vegetables, prune juice, apple juice and high fiber cereal.  Take a laxative if you don t move your bowels at least every other day. Miralax , Milk of Magnesia, Colace , or Senna  can be used to keep you regular.      Remember that you can always come back to the Emergency Department if you are not able to see your regular doctor in the amount of time listed above, if you get any new symptoms, or if there is anything that worries you.

## 2021-07-18 NOTE — ED PROVIDER NOTES
History   Chief Complaint:  Allergic Reaction     The history is provided by the patient.      Sinan Rodriguez is a 50 year old male with history of renal cancer, hyperlipidemia and HTN who presents with an allergic reaction. Sinan reports that in the last 30 minutes, he started to have an allergic reaction with an unknown cause. He states he gave himself 2 Benadryl PO en route. He endorses trouble swallowing, swollen tongue, swollen eyelids and hives. He denies any difficulty breathing. He notes this has happened to him before but he is unsure of the cause for either episodes.     Review of Systems   HENT: Positive for trouble swallowing.         Tongue and eyelid swelling   Gastrointestinal: Negative for nausea and vomiting.   Skin: Positive for rash (hives).   Neurological: Negative for light-headedness.   All other systems reviewed and are negative.    Allergies:  Atorvastatin  Erythromycin  Phenobarbital    Medications:  Cabometyx  Aspirin 325 mg  Synthroid/Levothroid  Cozaar  Prilosec  Crestor  Norvasc  Nivolumab    Past Medical History:    HTN  Renal cancer  Hyperlipidemia  GERD    Past Surgical History:    Orthopedic surgery   Strip vein bilateral    Family History:    Cholecystitis    Social History:  Presents alone    Physical Exam     Patient Vitals for the past 24 hrs:   BP Temp Temp src Pulse Resp SpO2   07/17/21 2303 119/78 -- -- 88 16 98 %   07/17/21 2200 104/79 -- -- 113 12 96 %   07/17/21 2130 108/83 -- -- 104 15 92 %   07/17/21 2100 107/82 -- -- 107 (!) 31 96 %   07/17/21 2030 114/78 -- -- 92 14 93 %   07/17/21 2000 110/76 -- -- 88 23 94 %   07/17/21 1930 120/82 -- -- 98 -- 98 %   07/17/21 1918 122/87 98.6  F (37  C) Oral 109 18 95 %       Physical Exam  General: Well-nourished  Eyes: PERRL, conjunctivae pink no scleral icterus or conjunctival injection  ENT:  Moist mucus membranes, posterior oropharynx clear without erythema or exudates.  Uvula midline without edema, +mild tongue and lower lip  edema. Bilateral eyelid edema.  Mallampati I.  No stridor or wheezing.  Respiratory:  Lungs clear to auscultation bilaterally, no crackles/rubs/wheezes.  Good air movement  CV: Normal rate and rhythm, no murmurs/rubs/gallops  GI:  Abdomen soft and non-distended.  Normoactive BS.  No tenderness, guarding or rebound  Skin: Warm, dry.  No rashes or petechiae. Diffuse hives.  Musculoskeletal: No peripheral edema or calf tenderness  Neuro: Alert and oriented to person/place/time  Psychiatric: Normal affect    Emergency Department Course   Emergency Department Course:    Reviewed:  I reviewed nursing notes, vitals, past medical history and care everywhere    Assessments:  1925 I obtained history and examined the patient as noted above.   2247 I rechecked the patient and explained findings.     Interventions:  1927 Epinephrine, 0.5 mg, Intramuscular   1935 Deltasone, 40 mg, Oral           Pepcid, 20 mg, Oral    Disposition:  The patient was discharged to home.     Impression & Plan     Medical Decision Making:  Sinan Rodriguez is a 50 year old male who presents with complaint of allergic reaction.  The patient has hives and given oral swelling, we treated for anaphylaxis with epinephrine as well as adjunct of medications..  He had some improvement.  No worsening.  We observed for nearly 4 hours and at that time he had significant improvement.  At this time, I do believe the patient is safe for discharge home.  I discharged with prescriptions for prednisone and epipen should she develop signs of anaphylaxis.  Recommended antihistamine and Pepcid as well.  Recommended follow-up with PMD in 1-2 days for persistent symptoms and gave precautions to return if worsening.      Diagnosis:    ICD-10-CM    1. Anaphylaxis, initial encounter  T78.2XXA    2. Urticaria  L50.9        Discharge Medications:  Discharge Medication List as of 7/17/2021 11:01 PM      START taking these medications    Details   EPINEPHrine (EPIPEN 2-CHERELLE) 0.3  MG/0.3ML injection 2-pack Inject 0.3 mLs (0.3 mg) into the muscle once as needed for anaphylaxis, Disp-1 each, R-0, Local Print      predniSONE (DELTASONE) 20 MG tablet Take two tablets (= 40mg) each day for 5 (five) days, Disp-10 tablet, R-0, Local Print             Scribe Disclosure:  I, Faustino Romero, am serving as a scribe at 7:23 PM on 7/17/2021 to document services personally performed by Fernanda Gilman MD based on my observations and the provider's statements to me.        Fernanda Gilman MD  07/18/21 7773

## 2021-07-18 NOTE — ED TRIAGE NOTES
Allergic reaction, unknown cause. Difficulty swallowing, hives, mouth muscles feel numb, eyelid swelling. No breathing difficulty reported. Took Benadryl PO x2 en route. VSS.

## 2021-07-30 ENCOUNTER — MYC MEDICAL ADVICE (OUTPATIENT)
Dept: FAMILY MEDICINE | Facility: CLINIC | Age: 51
End: 2021-07-30

## 2021-07-30 DIAGNOSIS — Z11.59 SCREENING FOR VIRAL DISEASE: Primary | ICD-10-CM

## 2021-08-13 ENCOUNTER — TRANSFERRED RECORDS (OUTPATIENT)
Dept: HEALTH INFORMATION MANAGEMENT | Facility: CLINIC | Age: 51
End: 2021-08-13

## 2021-08-21 ENCOUNTER — LAB (OUTPATIENT)
Dept: URGENT CARE | Facility: URGENT CARE | Age: 51
End: 2021-08-21
Attending: FAMILY MEDICINE
Payer: COMMERCIAL

## 2021-08-21 DIAGNOSIS — Z11.59 SCREENING FOR VIRAL DISEASE: ICD-10-CM

## 2021-08-21 PROCEDURE — U0003 INFECTIOUS AGENT DETECTION BY NUCLEIC ACID (DNA OR RNA); SEVERE ACUTE RESPIRATORY SYNDROME CORONAVIRUS 2 (SARS-COV-2) (CORONAVIRUS DISEASE [COVID-19]), AMPLIFIED PROBE TECHNIQUE, MAKING USE OF HIGH THROUGHPUT TECHNOLOGIES AS DESCRIBED BY CMS-2020-01-R: HCPCS

## 2021-08-21 PROCEDURE — U0005 INFEC AGEN DETEC AMPLI PROBE: HCPCS

## 2021-08-22 LAB — SARS-COV-2 RNA RESP QL NAA+PROBE: NEGATIVE

## 2021-09-10 ENCOUNTER — TRANSFERRED RECORDS (OUTPATIENT)
Dept: HEALTH INFORMATION MANAGEMENT | Facility: CLINIC | Age: 51
End: 2021-09-10
Payer: COMMERCIAL

## 2021-09-16 ENCOUNTER — IMMUNIZATION (OUTPATIENT)
Dept: NURSING | Facility: CLINIC | Age: 51
End: 2021-09-16
Payer: COMMERCIAL

## 2021-09-16 PROCEDURE — 91300 PR COVID VAC PFIZER DIL RECON 30 MCG/0.3 ML IM: CPT

## 2021-09-16 PROCEDURE — 0003A PR COVID VAC PFIZER DIL RECON 30 MCG/0.3 ML IM: CPT

## 2021-09-18 ENCOUNTER — HEALTH MAINTENANCE LETTER (OUTPATIENT)
Age: 51
End: 2021-09-18

## 2021-10-08 ENCOUNTER — TRANSFERRED RECORDS (OUTPATIENT)
Dept: HEALTH INFORMATION MANAGEMENT | Facility: CLINIC | Age: 51
End: 2021-10-08
Payer: COMMERCIAL

## 2021-11-05 ENCOUNTER — TRANSFERRED RECORDS (OUTPATIENT)
Dept: HEALTH INFORMATION MANAGEMENT | Facility: CLINIC | Age: 51
End: 2021-11-05
Payer: COMMERCIAL

## 2021-12-10 LAB — TSH SERPL-ACNC: 3.12 UIU/ML (ref 0.3–5)

## 2021-12-15 ENCOUNTER — TRANSFERRED RECORDS (OUTPATIENT)
Dept: HEALTH INFORMATION MANAGEMENT | Facility: CLINIC | Age: 51
End: 2021-12-15
Payer: COMMERCIAL

## 2021-12-17 ENCOUNTER — OFFICE VISIT (OUTPATIENT)
Dept: PODIATRY | Facility: CLINIC | Age: 51
End: 2021-12-17
Payer: COMMERCIAL

## 2021-12-17 VITALS — HEIGHT: 75 IN | BODY MASS INDEX: 33.45 KG/M2 | WEIGHT: 269 LBS

## 2021-12-17 DIAGNOSIS — L98.9 SKIN LESION OF FOOT: ICD-10-CM

## 2021-12-17 DIAGNOSIS — L85.9 HYPERKERATOSIS: Primary | ICD-10-CM

## 2021-12-17 DIAGNOSIS — L85.3 DRY SKIN: ICD-10-CM

## 2021-12-17 PROCEDURE — 11056 PARNG/CUTG B9 HYPRKR LES 2-4: CPT | Mod: GA | Performed by: PODIATRIST

## 2021-12-17 PROCEDURE — 99213 OFFICE O/P EST LOW 20 MIN: CPT | Mod: 25 | Performed by: PODIATRIST

## 2021-12-17 RX ORDER — AMMONIUM LACTATE 12 G/100G
CREAM TOPICAL 2 TIMES DAILY
Qty: 385 G | Refills: 3 | Status: SHIPPED | OUTPATIENT
Start: 2021-12-17 | End: 2023-02-08

## 2021-12-17 ASSESSMENT — MIFFLIN-ST. JEOR: SCORE: 2155.81

## 2021-12-17 NOTE — PROGRESS NOTES
"Foot & Ankle Surgery   December 17, 2021    S:  Pt is seen today for evaluation of painful hyperkeratotic lesion at the plantar aspect of the left third metatarsophalangeal joint, and he now has 1 at the plantar fifth metatarsal phalangeal joint as well.  I saw him last December and it last April for the same issue.  He has been doing home therapies..    Vitals:    12/17/21 0842   Weight: 122 kg (269 lb)   Height: 1.905 m (6' 3\")   '      ROS - Pos for CC.  Patient denies current nausea, vomiting, chills, fevers, belly pain, calf pain, chest pain or SOB.  Complete remainder of ROS it otherwise neg.      PE:  Gen:   No apparent distress  Eye:    Visual scanning without deficit  Ear:    Response to auditory stimuli wnl  Lung:    Non-labored breathing on RA noted  Abd:    NTND per patient report  Lymph:    Neg for pitting/non-pitting edema BLE  Vasc:    Pulses palpable, CFT minimally delayed  Neuro:    Light touch sensation intact to all sensory nerve distributions without paresthesias  Derm:    Nucleated hyperkeratotic lesion at the plantar left third MPJ, tender, and a minimally symptomatic nucleated hyperkeratotic lesion at the plantar left fifth MPJ  MSK:    ROM, strength wnl without limitation, no pain on palpation noted.  Calf:    Neg for redness, swelling or tenderness    Assessment:  51 year old male with hyperkeratotic lesion/IPK x2 left foot      Plan:  Discussed etiologies, anatomy and options  1.  Hyperkeratotic lesion/IPK x2 left foot  -He requested debridement of both lesions.  The lesions were debrided with a 15 blade, removing the nucleated core, without incident.  The ABN was signed  -We discussed the option for custom orthotics.  He states he has tried these in the past with limited improvement.  He will consider  -We briefly discussed the surgical option for this.  This would likely entail excision of the plantar skin lesion and third metatarsal osteotomy, and a similar option would be available for " the fifth MPJ.  I advised against surgery for this for now.  -He requested a refill of his Lac-Hydrin.  This was sent to his pharmacy    Follow up: As needed or sooner with acute issues           Mike Calix DPM FACFAS FACFAOM  Podiatric Foot & Ankle Surgeon  [facility]  [phone]    Disclaimer: This note consists of symbols derived from keyboarding, dictation and/or voice recognition software. As a result, there may be errors in the script that have gone undetected. Please consider this when interpreting information found in this chart.

## 2021-12-17 NOTE — LETTER
"    12/17/2021         RE: Sinan Rodriguez  [address]  [place] [address]        Dear Colleague,    Thank you for referring your patient, Sinan Rodriguez, to the [facility]. Please see a copy of my visit note below.    Foot & Ankle Surgery   December 17, 2021    S:  Pt is seen today for evaluation of painful hyperkeratotic lesion at the plantar aspect of the left third metatarsophalangeal joint, and he now has 1 at the plantar fifth metatarsal phalangeal joint as well.  I saw him last December and it last April for the same issue.  He has been doing home therapies..    Vitals:    12/17/21 0842   Weight: 122 kg (269 lb)   Height: 1.905 m (6' 3\")   '      ROS - Pos for CC.  Patient denies current nausea, vomiting, chills, fevers, belly pain, calf pain, chest pain or SOB.  Complete remainder of ROS it otherwise neg.      PE:  Gen:   No apparent distress  Eye:    Visual scanning without deficit  Ear:    Response to auditory stimuli wnl  Lung:    Non-labored breathing on RA noted  Abd:    NTND per patient report  Lymph:    Neg for pitting/non-pitting edema BLE  Vasc:    Pulses palpable, CFT minimally delayed  Neuro:    Light touch sensation intact to all sensory nerve distributions without paresthesias  Derm:    Nucleated hyperkeratotic lesion at the plantar left third MPJ, tender, and a minimally symptomatic nucleated hyperkeratotic lesion at the plantar left fifth MPJ  MSK:    ROM, strength wnl without limitation, no pain on palpation noted.  Calf:    Neg for redness, swelling or tenderness    Assessment:  51 year old male with hyperkeratotic lesion/IPK x2 left foot      Plan:  Discussed etiologies, anatomy and options  1.  Hyperkeratotic lesion/IPK x2 left foot  -He requested debridement of both lesions.  The lesions were debrided with a 15 blade, removing the nucleated core, without incident.  The ABN was signed  -We discussed the option for custom orthotics.  He " states he has tried these in the past with limited improvement.  He will consider  -We briefly discussed the surgical option for this.  This would likely entail excision of the plantar skin lesion and third metatarsal osteotomy, and a similar option would be available for the fifth MPJ.  I advised against surgery for this for now.  -He requested a refill of his Lac-Hydrin.  This was sent to his pharmacy    Follow up: As needed or sooner with acute issues           Mike Calix DPM [facility]  Podiatric Foot & Ankle Surgeon  [facility] [place]  [phone]    Disclaimer: This note consists of symbols derived from keyboarding, dictation and/or voice recognition software. As a result, there may be errors in the script that have gone undetected. Please consider this when interpreting information found in this chart.          Again, thank you for allowing me to participate in the care of your patient.        Sincerely,        Mike Calix DPM, ROSSI

## 2022-01-13 ENCOUNTER — MYC MEDICAL ADVICE (OUTPATIENT)
Dept: FAMILY MEDICINE | Facility: CLINIC | Age: 52
End: 2022-01-13
Payer: COMMERCIAL

## 2022-01-14 ENCOUNTER — E-VISIT (OUTPATIENT)
Dept: FAMILY MEDICINE | Facility: CLINIC | Age: 52
End: 2022-01-14
Payer: COMMERCIAL

## 2022-01-14 DIAGNOSIS — J20.9 ACUTE BRONCHITIS WITH SYMPTOMS > 10 DAYS: Primary | ICD-10-CM

## 2022-01-14 PROCEDURE — 99421 OL DIG E/M SVC 5-10 MIN: CPT | Performed by: NURSE PRACTITIONER

## 2022-01-14 RX ORDER — DOXYCYCLINE HYCLATE 100 MG
100 TABLET ORAL 2 TIMES DAILY
Qty: 14 TABLET | Refills: 0 | Status: SHIPPED | OUTPATIENT
Start: 2022-01-14 | End: 2022-01-21

## 2022-01-14 NOTE — PATIENT INSTRUCTIONS
"    Dear Sinan Rodriguez    After reviewing your responses, I've been able to diagnose you with \"Bronchitis\" which is a common infection of your lungs. While this is most commonly caused by a virus, the symptoms you have given suggest you should be treated with antibiotics.     I have sent docycyline to your pharmacy to treat this infection.     It is important that you take all of your prescribed medication even if your symptoms are improving after a few doses. Taking all of your medicine helps prevent the symptoms from returning.     If your symptoms worsen, you develop chest pain or shortness of breath, fevers over 101, or are not improving in 5 days, please contact your primary care provider for an appointment or visit any of our convenient Walk-in Care or Urgent Care Centers to be seen which can be found on our website here.    Thanks again for choosing us as your health care partner,    Sonia Ralph, CNP    We can not prescribe cough syrup with codeine through this program. Sorry. Try Mucinex DM or Robitussin DM for similar effect.   Swollen glands in the neck could be signs of an infection. I placed you on an antibiotic for this.     "

## 2022-01-14 NOTE — TELEPHONE ENCOUNTER
Patient calling in about this. Recommended an E-visit. Patient will do that for cold medicine.    Nishi Darling RN

## 2022-01-17 NOTE — TELEPHONE ENCOUNTER
Patient did Evisit 1/14/22 with provider at [facility].    Jolynn Weathers RN

## 2022-01-28 ENCOUNTER — TRANSFERRED RECORDS (OUTPATIENT)
Dept: HEALTH INFORMATION MANAGEMENT | Facility: CLINIC | Age: 52
End: 2022-01-28
Payer: COMMERCIAL

## 2022-03-04 ENCOUNTER — TRANSFERRED RECORDS (OUTPATIENT)
Dept: HEALTH INFORMATION MANAGEMENT | Facility: CLINIC | Age: 52
End: 2022-03-04
Payer: COMMERCIAL

## 2022-03-22 DIAGNOSIS — K21.9 GASTROESOPHAGEAL REFLUX DISEASE WITHOUT ESOPHAGITIS: ICD-10-CM

## 2022-03-23 NOTE — TELEPHONE ENCOUNTER
"Requested Prescriptions   Signed Prescriptions Disp Refills    omeprazole (PRILOSEC) 20 MG DR capsule 90 capsule 3     Sig: TAKE 1 CAPSULE DAILY       PPI Protocol Passed - 3/22/2022  8:50 PM        Passed - Not on Clopidogrel (unless Pantoprazole ordered)        Passed - No diagnosis of osteoporosis on record        Passed - Recent (12 mo) or future (30 days) visit within the authorizing provider's specialty     Patient has had an office visit with the authorizing provider or a provider within the authorizing providers department within the previous 12 mos or has a future within next 30 days. See \"Patient Info\" tab in inbasket, or \"Choose Columns\" in Meds & Orders section of the refill encounter.              Passed - Medication is active on med list        Passed - Patient is age 18 or older           Tayler Brizuela RN  [facility]     "

## 2022-04-12 DIAGNOSIS — E78.2 MIXED HYPERLIPIDEMIA: ICD-10-CM

## 2022-04-13 RX ORDER — ROSUVASTATIN CALCIUM 20 MG/1
TABLET, COATED ORAL
Qty: 30 TABLET | Refills: 0 | Status: SHIPPED | OUTPATIENT
Start: 2022-04-13 | End: 2022-05-16

## 2022-04-13 NOTE — TELEPHONE ENCOUNTER
Medication is being filled for 1 time refill only due to:  Patient needs to be seen because it has been more than one year since last visit.     Due for physical and fasting labs.  Last 4/28/21.  Jolynn Weathers RN

## 2022-05-13 ENCOUNTER — TRANSFERRED RECORDS (OUTPATIENT)
Dept: HEALTH INFORMATION MANAGEMENT | Facility: CLINIC | Age: 52
End: 2022-05-13
Payer: COMMERCIAL

## 2022-06-25 ENCOUNTER — HEALTH MAINTENANCE LETTER (OUTPATIENT)
Age: 52
End: 2022-06-25

## 2022-08-05 ENCOUNTER — TRANSFERRED RECORDS (OUTPATIENT)
Dept: HEALTH INFORMATION MANAGEMENT | Facility: CLINIC | Age: 52
End: 2022-08-05

## 2022-08-08 ASSESSMENT — ENCOUNTER SYMPTOMS
PARESTHESIAS: 0
EYE PAIN: 0
ARTHRALGIAS: 0
FEVER: 0
SHORTNESS OF BREATH: 0
DIARRHEA: 0
PALPITATIONS: 0
DYSURIA: 0
CHILLS: 0
HEADACHES: 0
WEAKNESS: 0
NERVOUS/ANXIOUS: 0
COUGH: 0
HEMATURIA: 0
JOINT SWELLING: 0
HEARTBURN: 0
FREQUENCY: 0
MYALGIAS: 0
HEMATOCHEZIA: 0
CONSTIPATION: 0
DIZZINESS: 0
NAUSEA: 0
ABDOMINAL PAIN: 0

## 2022-08-10 ENCOUNTER — OFFICE VISIT (OUTPATIENT)
Dept: FAMILY MEDICINE | Facility: CLINIC | Age: 52
End: 2022-08-10
Payer: COMMERCIAL

## 2022-08-10 VITALS
RESPIRATION RATE: 15 BRPM | BODY MASS INDEX: 32.65 KG/M2 | DIASTOLIC BLOOD PRESSURE: 80 MMHG | WEIGHT: 261.2 LBS | OXYGEN SATURATION: 99 % | HEART RATE: 54 BPM | SYSTOLIC BLOOD PRESSURE: 112 MMHG | TEMPERATURE: 97.3 F

## 2022-08-10 DIAGNOSIS — K21.9 GASTROESOPHAGEAL REFLUX DISEASE WITHOUT ESOPHAGITIS: ICD-10-CM

## 2022-08-10 DIAGNOSIS — R73.03 PREDIABETES: ICD-10-CM

## 2022-08-10 DIAGNOSIS — I10 ESSENTIAL HYPERTENSION: ICD-10-CM

## 2022-08-10 DIAGNOSIS — C79.51 METASTATIC RENAL CELL CARCINOMA TO BONE (H): ICD-10-CM

## 2022-08-10 DIAGNOSIS — G47.00 INSOMNIA, UNSPECIFIED TYPE: ICD-10-CM

## 2022-08-10 DIAGNOSIS — C64.9 METASTATIC RENAL CELL CARCINOMA TO BONE (H): ICD-10-CM

## 2022-08-10 DIAGNOSIS — E03.2 HYPOTHYROIDISM DUE TO MEDICATION: ICD-10-CM

## 2022-08-10 DIAGNOSIS — Z00.00 ROUTINE GENERAL MEDICAL EXAMINATION AT A HEALTH CARE FACILITY: Primary | ICD-10-CM

## 2022-08-10 DIAGNOSIS — E78.2 MIXED HYPERLIPIDEMIA: ICD-10-CM

## 2022-08-10 DIAGNOSIS — Z11.3 SCREEN FOR STD (SEXUALLY TRANSMITTED DISEASE): ICD-10-CM

## 2022-08-10 LAB — HBA1C MFR BLD: 6 % (ref 0–5.6)

## 2022-08-10 PROCEDURE — 87491 CHLMYD TRACH DNA AMP PROBE: CPT | Performed by: FAMILY MEDICINE

## 2022-08-10 PROCEDURE — 99396 PREV VISIT EST AGE 40-64: CPT | Mod: 25 | Performed by: FAMILY MEDICINE

## 2022-08-10 PROCEDURE — 86803 HEPATITIS C AB TEST: CPT | Performed by: FAMILY MEDICINE

## 2022-08-10 PROCEDURE — 87389 HIV-1 AG W/HIV-1&-2 AB AG IA: CPT | Performed by: FAMILY MEDICINE

## 2022-08-10 PROCEDURE — 99214 OFFICE O/P EST MOD 30 MIN: CPT | Mod: 25 | Performed by: FAMILY MEDICINE

## 2022-08-10 PROCEDURE — 87591 N.GONORRHOEAE DNA AMP PROB: CPT | Performed by: FAMILY MEDICINE

## 2022-08-10 PROCEDURE — 83036 HEMOGLOBIN GLYCOSYLATED A1C: CPT | Performed by: FAMILY MEDICINE

## 2022-08-10 PROCEDURE — 91305 COVID-19,PF,PFIZER (12+ YRS): CPT | Performed by: FAMILY MEDICINE

## 2022-08-10 PROCEDURE — 36415 COLL VENOUS BLD VENIPUNCTURE: CPT | Performed by: FAMILY MEDICINE

## 2022-08-10 PROCEDURE — 80061 LIPID PANEL: CPT | Performed by: FAMILY MEDICINE

## 2022-08-10 PROCEDURE — 86780 TREPONEMA PALLIDUM: CPT | Performed by: FAMILY MEDICINE

## 2022-08-10 PROCEDURE — 0054A COVID-19,PF,PFIZER (12+ YRS): CPT | Performed by: FAMILY MEDICINE

## 2022-08-10 RX ORDER — ROSUVASTATIN CALCIUM 20 MG/1
TABLET, COATED ORAL
Qty: 90 TABLET | Refills: 3 | Status: SHIPPED | OUTPATIENT
Start: 2022-08-10 | End: 2023-09-29

## 2022-08-10 RX ORDER — AMLODIPINE BESYLATE 10 MG/1
10 TABLET ORAL DAILY
Qty: 90 TABLET | Refills: 3 | Status: SHIPPED | OUTPATIENT
Start: 2022-08-10 | End: 2023-09-29

## 2022-08-10 RX ORDER — ZOLEDRONIC ACID 0.04 MG/ML
4 INJECTION, SOLUTION INTRAVENOUS ONCE
Qty: 100 ML | Refills: 0 | COMMUNITY
Start: 2022-08-10

## 2022-08-10 RX ORDER — ZOLPIDEM TARTRATE 10 MG/1
10 TABLET ORAL
Qty: 30 TABLET | Refills: 1 | Status: SHIPPED | OUTPATIENT
Start: 2022-08-10 | End: 2023-09-29

## 2022-08-10 RX ORDER — LOSARTAN POTASSIUM 25 MG/1
75 TABLET ORAL DAILY
Qty: 270 TABLET | Refills: 3 | Status: SHIPPED | OUTPATIENT
Start: 2022-08-10 | End: 2023-09-29

## 2022-08-10 ASSESSMENT — ENCOUNTER SYMPTOMS
ABDOMINAL PAIN: 0
HEADACHES: 0
HEMATOCHEZIA: 0
JOINT SWELLING: 0
MYALGIAS: 0
SHORTNESS OF BREATH: 0
DIARRHEA: 0
WEAKNESS: 0
ARTHRALGIAS: 0
DYSURIA: 0
CHILLS: 0
HEARTBURN: 0
CONSTIPATION: 0
HEMATURIA: 0
FREQUENCY: 0
NAUSEA: 0
COUGH: 0
PALPITATIONS: 0
NERVOUS/ANXIOUS: 0
PARESTHESIAS: 0
EYE PAIN: 0
DIZZINESS: 0
FEVER: 0

## 2022-08-10 NOTE — PROGRESS NOTES
SUBJECTIVE:   CC: Sinan Rodriguez is an 51 year old male who presents for preventative health visit.         Healthy Habits:     Getting at least 3 servings of Calcium per day:  Yes    Bi-annual eye exam:  Yes    Dental care twice a year:  Yes    Sleep apnea or symptoms of sleep apnea:  None    Diet:  Regular (no restrictions)    Frequency of exercise:  4-5 days/week    Duration of exercise:  45-60 minutes    Taking medications regularly:  Yes    Medication side effects:  Not applicable and None    PHQ-2 Total Score: 0    Additional concerns today:  No          -------------------------------------    Today's PHQ-2 Score:   PHQ-2 ( 1999 Pfizer) 8/8/2022   Q1: Little interest or pleasure in doing things 0   Q2: Feeling down, depressed or hopeless 0   PHQ-2 Score 0   PHQ-2 Total Score (12-17 Years)- Positive if 3 or more points; Administer PHQ-A if positive -   Q1: Little interest or pleasure in doing things Not at all   Q2: Feeling down, depressed or hopeless Not at all   PHQ-2 Score 0       Abuse: Current or Past(Physical, Sexual or Emotional)- No  Do you feel safe in your environment? Yes    TSH 1.82    Social History     Tobacco Use     Smoking status: Former Smoker     Smokeless tobacco: Former User   Substance Use Topics     Alcohol use: Yes     If you drink alcohol do you typically have >3 drinks per day or >7 drinks per week? No    Alcohol Use 8/8/2022   Prescreen: >3 drinks/day or >7 drinks/week? No   Prescreen: >3 drinks/day or >7 drinks/week? -   No flowsheet data found.    Last PSA:   PSA   Date Value Ref Range Status   04/28/2021 2.41 0 - 4 ug/L Final     Comment:     Assay Method:  Chemiluminescence using Siemens Vista analyzer       Reviewed orders with patient. Reviewed health maintenance and updated orders accordingly - Yes  Patient Active Problem List   Diagnosis     Chondromalacia of patella     Knee pain     Low back pain     Essential hypertension     Clot     Mixed hyperlipidemia      Gastroesophageal reflux disease without esophagitis     Other insomnia     Lumbar herniated disc     Ineffective esophageal motility     Obesity, unspecified     Prediabetes     Cholelithiasis     Varicose veins of other specified sites     Metastatic renal cell carcinoma to bone (H)     Hypothyroidism due to medication     Elevated glucose     Past Surgical History:   Procedure Laterality Date     COLONOSCOPY N/A 8/23/2019    Procedure: COLONOSCOPY;  Surgeon: Silvano Segovia MD;  Location:  GI     KNEE SURGERY Bilateral     scope multiple - most recent 2014     ORTHOPEDIC SURGERY       STRIP VEIN BILATERAL Bilateral 2015       Social History     Tobacco Use     Smoking status: Former Smoker     Years: 20.00     Types: Cigars, Dip, chew, snus or snuff     Smokeless tobacco: Former User     Tobacco comment: Occasional cigar.  Occasional swedish snus.   Substance Use Topics     Alcohol use: Yes     Comment: Mostly wine.  3-4 per week.     Family History   Problem Relation Age of Onset     Cholecystitis Father 67        passed away - gangrene     Depression Father      Cancer Brother         arm - sarcoma in HS     Coronary Artery Disease Paternal Grandfather      Hypertension Paternal Grandfather      Hyperlipidemia Paternal Grandfather      Asthma Paternal Grandmother      Osteoporosis Paternal Grandmother            Reviewed and updated as needed this visit by clinical staff                    Reviewed and updated as needed this visit by Provider                       Review of Systems   Constitutional: Negative for chills and fever.   HENT: Negative for congestion, ear pain and hearing loss.    Eyes: Negative for pain and visual disturbance.   Respiratory: Negative for cough and shortness of breath.    Cardiovascular: Negative for chest pain, palpitations and peripheral edema.   Gastrointestinal: Negative for abdominal pain, constipation, diarrhea, heartburn, hematochezia and nausea.   Genitourinary:  Negative for dysuria, frequency, genital sores, hematuria and impotence.   Musculoskeletal: Negative for arthralgias, joint swelling and myalgias.   Skin: Negative for rash.   Neurological: Negative for dizziness, weakness, headaches and paresthesias.   Psychiatric/Behavioral: Negative for mood changes. The patient is not nervous/anxious.          OBJECTIVE:   There were no vitals taken for this visit.    Physical Exam  GENERAL: alert and no distress  EYES: Eyes grossly normal to inspection, PERRL and conjunctivae and sclerae normal  HENT: ear canals and TM's normal, nose and mouth without ulcers or lesions  NECK: no adenopathy, no asymmetry, masses, or scars and thyroid normal to palpation  RESP: lungs clear to auscultation - no rales, rhonchi or wheezes  CV: regular rate and rhythm, normal S1 S2, no S3 or S4, no murmur, click or rub, no peripheral edema and peripheral pulses strong  ABDOMEN: soft, nontender, no hepatosplenomegaly, no masses and bowel sounds normal  MS: no gross musculoskeletal defects noted, no edema  SKIN: no suspicious lesions or rashes  NEURO: Normal strength and tone, mentation intact and speech normal  PSYCH: mentation appears normal, affect normal/bright    Diagnostic Test Results:  Labs reviewed in Epic  Results for orders placed or performed in visit on 08/10/22 (from the past 24 hour(s))   Hemoglobin A1c   Result Value Ref Range    Hemoglobin A1C 6.0 (H) 0.0 - 5.6 %       ASSESSMENT/PLAN:   (Z00.00) Routine general medical examination at a health care facility  (primary encounter diagnosis)  Comment:    Plan: Lipid panel reflex to direct LDL Non-fasting             (C79.51,  C64.9) Metastatic renal cell carcinoma to bone (H)  Comment:    Plan: working with oncology   Medications current on med list     (R73.03) Prediabetes  Comment: pending lab   Plan: Hemoglobin A1c             (E78.2) Mixed hyperlipidemia  Comment: on crestor - will check lipids  Has LFT and other labs drawn with  "oncology   Plan: Lipid panel reflex to direct LDL Non-fasting,         rosuvastatin (CRESTOR) 20 MG tablet             (Z11.3) Screen for STD (sexually transmitted disease)  Comment:    Plan: HIV Antigen Antibody Combo, Hepatitis C Screen         Reflex to HCV RNA Quant and Genotype, Treponema        Abs w Reflex to RPR and Titer, NEISSERIA         GONORRHOEA PCR, CHLAMYDIA TRACHOMATIS PCR             (E03.2) Hypothyroidism due to medication  Comment:    Plan:      (I10) Essential hypertension  Comment: controlled   Plan: losartan (COZAAR) 25 MG tablet, amLODIPine         (NORVASC) 10 MG tablet             (G47.00) Insomnia, unspecified type  Comment:    Plan: zolpidem (AMBIEN) 10 MG tablet             (K21.9) Gastroesophageal reflux disease without esophagitis  Comment:    Plan: omeprazole (PRILOSEC) 20 MG DR capsule                   COUNSELING:   Reviewed preventive health counseling, as reflected in patient instructions    Estimated body mass index is 33.62 kg/m  as calculated from the following:    Height as of 12/17/21: 1.905 m (6' 3\").    Weight as of 12/17/21: 122 kg (269 lb).     Weight management plan: Discussed healthy diet and exercise guidelines    He reports that he has quit smoking. He has quit using smokeless tobacco.      Counseling Resources:  ATP IV Guidelines  Pooled Cohorts Equation Calculator  FRAX Risk Assessment  ICSI Preventive Guidelines  Dietary Guidelines for Americans, 2010  USDA's MyPlate  ASA Prophylaxis  Lung CA Screening    Jyotsna Mora, [facility]  "

## 2022-08-11 LAB
C TRACH DNA SPEC QL NAA+PROBE: NEGATIVE
CHOLEST SERPL-MCNC: 127 MG/DL
FASTING STATUS PATIENT QL REPORTED: YES
HCV AB SERPL QL IA: NONREACTIVE
HDLC SERPL-MCNC: 42 MG/DL
HIV 1+2 AB+HIV1 P24 AG SERPL QL IA: NONREACTIVE
LDLC SERPL CALC-MCNC: 66 MG/DL
N GONORRHOEA DNA SPEC QL NAA+PROBE: NEGATIVE
NONHDLC SERPL-MCNC: 85 MG/DL
T PALLIDUM AB SER QL: NONREACTIVE
TRIGL SERPL-MCNC: 94 MG/DL

## 2022-08-12 ENCOUNTER — MYC MEDICAL ADVICE (OUTPATIENT)
Dept: FAMILY MEDICINE | Facility: CLINIC | Age: 52
End: 2022-08-12

## 2022-08-12 DIAGNOSIS — E78.2 MIXED HYPERLIPIDEMIA: Primary | ICD-10-CM

## 2022-08-12 NOTE — TELEPHONE ENCOUNTER
Forms faxed to [facility] beds at the [facility] fax # [phone] and copy sent to stat scan.     Jen CONNOLLY

## 2022-08-12 NOTE — RESULT ENCOUNTER NOTE
Dear Sinan,   Your test results are all back -   -All of your labs are normal except your glucose like we discussed.  Keep working on healthy diet and activity.  Let us know if you have any questions.  -Jyotsna Mora, DO

## 2022-10-28 ENCOUNTER — TRANSFERRED RECORDS (OUTPATIENT)
Dept: HEALTH INFORMATION MANAGEMENT | Facility: CLINIC | Age: 52
End: 2022-10-28

## 2022-10-31 ENCOUNTER — TRANSFERRED RECORDS (OUTPATIENT)
Dept: HEALTH INFORMATION MANAGEMENT | Facility: CLINIC | Age: 52
End: 2022-10-31

## 2022-11-20 ENCOUNTER — HEALTH MAINTENANCE LETTER (OUTPATIENT)
Age: 52
End: 2022-11-20

## 2022-12-06 ENCOUNTER — IMMUNIZATION (OUTPATIENT)
Dept: NURSING | Facility: CLINIC | Age: 52
End: 2022-12-06
Payer: COMMERCIAL

## 2022-12-06 PROCEDURE — 0124A COVID-19 VACCINE BIVALENT BOOSTER 12+ (PFIZER): CPT

## 2022-12-06 PROCEDURE — 91312 COVID-19 VACCINE BIVALENT BOOSTER 12+ (PFIZER): CPT

## 2022-12-30 ENCOUNTER — HOSPITAL ENCOUNTER (EMERGENCY)
Facility: CLINIC | Age: 52
Discharge: HOME | End: 2022-12-30
Payer: COMMERCIAL

## 2023-01-02 ENCOUNTER — VIRTUAL VISIT (OUTPATIENT)
Dept: FAMILY MEDICINE | Facility: CLINIC | Age: 53
End: 2023-01-02
Payer: COMMERCIAL

## 2023-01-02 DIAGNOSIS — U07.1 INFECTION DUE TO 2019 NOVEL CORONAVIRUS: Primary | ICD-10-CM

## 2023-01-02 DIAGNOSIS — C64.9 METASTATIC RENAL CELL CARCINOMA TO BONE (H): ICD-10-CM

## 2023-01-02 DIAGNOSIS — C79.51 METASTATIC RENAL CELL CARCINOMA TO BONE (H): ICD-10-CM

## 2023-01-02 PROCEDURE — 99213 OFFICE O/P EST LOW 20 MIN: CPT | Mod: CS | Performed by: STUDENT IN AN ORGANIZED HEALTH CARE EDUCATION/TRAINING PROGRAM

## 2023-01-02 NOTE — PROGRESS NOTES
Sinan is a 52 year old who is being evaluated via a billable video visit.      How would you like to obtain your AVS? MyChart  If the video visit is dropped, the invitation should be resent by: Text to cell phone: [phone]  Will anyone else be joining your video visit? No        Assessment & Plan     Infection due to 2019 novel coronavirus  High risk patient due to RCC.. normal renal and liver function on labs. On day 5 of illness. Will prescribe paxlovid. Discussed potential side effects. Continue supportive treatment.     Reviewed his recent labwork from oncology with him from october 26, 2022 from [facility], has labs checked regularly.  gfr 104   Creatinine 0.84   ALT: 52  AST: 32  BP average for last 6 months: 122/80  - nirmatrelvir and ritonavir (PAXLOVID) therapy pack; Take 3 tablets by mouth 2 times daily for 5 days (Take 2 Nirmatrelvir tablets and 1 Ritonavir tablet twice daily for 5 days)    Metastatic renal cell carcinoma to bone (H)  High risk due to RCC.       No follow-ups on file.    Sarahy Moya [facility]   Sinan is a 52 year old, presenting for the following health issues:  No chief complaint on file.      [facility]     ED/UC Followup:    Facility:  [facility] Emergency Department  Date of visit: 12/30/2022  Reason for visit: Fever of 103  Current Status:     3 hour wait and left  after 10  min    COVID-19 Symptom Review  How many days ago did these symptoms start?   Tested positive 12/28  Exposure[place]  Sx started 12/28 was shoveling and got more fatigue that normal    Are any of the following symptoms significant for you?    New or worsening difficulty breathing? No    Worsening cough? Yes    Fever or chills? Yes, the highest temperature was  103 on 12/28    Headache: YES    Sore throat: YES    Chest pain: No    Diarrhea: No    Body aches? No      What treatments has patient tried? Tylenol, left  over Robitussin wth codeine, OTC lozenge, etc     Does patient live in a nursing home, group home, or shelter? No  Does patient have a way to get food/medications during quarantined? Yes, I have a friend or family member who can help me.      He is feeling significantly better. By 1/31/22 his temp started to improve down to 100-100.5F. still has a headache and a cough, sometimes productive, sore throat. Fatigued. No SOB. No CP or tachycardia. With tylenol it's back to normal temp. No GI issues.       117/79, HR 82  121/77, 89      Review of Systems   Constitutional, HEENT, cardiovascular, pulmonary, gi and gu systems are negative, except as otherwise noted.      Objective           Vitals:  No vitals were obtained today due to virtual visit.    Physical Exam   GENERAL: Healthy, alert and no distress  EYES: Eyes grossly normal to inspection.  No discharge or erythema, or obvious scleral/conjunctival abnormalities.  RESP: No audible wheeze, cough, or visible cyanosis.  No visible retractions or increased work of breathing.    SKIN: Visible skin clear. No significant rash, abnormal pigmentation or lesions.  NEURO: Cranial nerves grossly intact.  Mentation and speech appropriate for age.  PSYCH: Mentation appears normal, affect normal/bright, judgement and insight intact, normal speech and appearance well-groomed.            Video-Visit Details    Type of service:  Video Visit   Start: 3:10  End: 3:30   Originating Location (pt. Location): Home  Distant Location (provider location):  On-site  Platform used for Video Visit: Tamie

## 2023-01-02 NOTE — PATIENT INSTRUCTIONS
COVID-19 Outpatient Treatments  Your care team can help you find the best treatments for COVID-19. Talk to a health care provider or refer to the FDA medicine fact sheets below.    Important: You can't have Paxlovid or molnupiravir if you're starting the medicine more than 5 days after your symptoms have started.  Paxlovid: https://www.fda.gov/media/411362/download  Molnupiravir (Lagevrio): https://www.fda.gov/media/350955/download  Paxlovid (nimatrelvir and ritonavir)  How it works  Two medicines (nirmatrelvir and ritonavir) are taken together. They stop the virus from growing. Less amount of virus is easier for your body to fight.  Benefits  Lowers risk of a hospital stay or death from COVID-19.  How to take    Medicine comes in a daily container with both medicine tablets. Take by mouth twice daily (once in the morning, once at night) for 5 days.    The number of tablets to take varies by patient.    Don't chew or break capsules. Swallow whole.  When to take  Take as soon as possible after positive COVID-19 test result, and within 5 days of your first symptoms.  Who can take it  Patients must be 12 years or older, weigh at least 88 pounds, and have tested positive for COVID-19. Paxlovid is the preferred treatment for pregnant patients.  Possible side effects  Can cause altered sense of taste, diarrhea (loose, watery stools), high blood pressure, muscle aches.  Medicine conflicts    Some medicines may conflict with Paxlovid and may cause serious side effects.    Tell your care team about all the medicines you take, including prescription and over-the-counter medicines, vitamins, and herbal supplements.    Your care team will review your medicines to make sure that you can safely take Paxlovid.  Cautions    Paxlovid is not advised for patients with severe kidney or liver disease. If you have kidney or liver problems, the dose may need to be changed.    If you're pregnant or breastfeeding, talk to your care team  about your options.    If you take hormonal birth control (such as the Pill), then you or your partner should also use a non-hormonal form of birth control (such as a condom). Keep doing this for 1 menstrual cycle after your last dose of Paxlovid.  Molnupiravir (Lagevrio)  How it works  Stops the virus from growing. Less amount of virus is easier for your body to fight.  Benefits  Lowers risk of a hospital stay or death from COVID-19.  How to take  Take 4 capsules by mouth every 12 hours (4 in the morning and 4 at night) for 5 days. Don't chew or break capsules. Swallow whole.  When to take  Take as soon as possible after positive COVID-19 test result, and within 5 days of your first symptoms.  Who can take it  Patients must be 18 years or older and have tested positive for COVID-19.  Possible side effects  Diarrhea (loose, watery stools), nausea (feeling sick to your stomach), dizziness, headaches.  Medicine conflicts  Right now, there are no known conflicts with other drugs. But tell your care team about all medicines you take.  Cautions    This medicine is not advised for patients who are pregnant.    If you are someone who could become pregnant, use trusted birth control until 4 days after your last dose of molnupiravir.    If your partner could become pregnant, you should use trusted birth control until 3 months after your last dose of molnupiravir.  For informational purposes only. Not to replace the advice of your health care provider. Copyright   2022 [facility]. All rights reserved. Clinically reviewed by Celina Hernandez, PharmD, BCACP. [facility] [id number] - REV 12/22.

## 2023-01-04 ENCOUNTER — MYC MEDICAL ADVICE (OUTPATIENT)
Dept: FAMILY MEDICINE | Facility: CLINIC | Age: 53
End: 2023-01-04

## 2023-01-04 DIAGNOSIS — U07.1 INFECTION DUE TO 2019 NOVEL CORONAVIRUS: Primary | ICD-10-CM

## 2023-01-05 RX ORDER — BENZONATATE 200 MG/1
200 CAPSULE ORAL 3 TIMES DAILY PRN
Qty: 60 CAPSULE | Refills: 0 | Status: SHIPPED | OUTPATIENT
Start: 2023-01-05 | End: 2023-09-29

## 2023-01-05 NOTE — TELEPHONE ENCOUNTER
Routing  My Chart message  to Dr. Moya    Patient requesting something for cough.  Benzonatate?    RN huddled with Dr. Moya and reviewed My Chart message and recommendations.    Bartolome Watters, RN, BSN, PHN  [facility]

## 2023-01-05 NOTE — TELEPHONE ENCOUNTER
Agree with message. Discussed with RN. Will send in rx for tessalon. He is on aspirin 325mg daily   Nita ABAD

## 2023-01-12 ENCOUNTER — MYC MEDICAL ADVICE (OUTPATIENT)
Dept: FAMILY MEDICINE | Facility: CLINIC | Age: 53
End: 2023-01-12

## 2023-01-30 ENCOUNTER — MYC MEDICAL ADVICE (OUTPATIENT)
Dept: FAMILY MEDICINE | Facility: CLINIC | Age: 53
End: 2023-01-30
Payer: COMMERCIAL

## 2023-01-30 DIAGNOSIS — E78.2 MIXED HYPERLIPIDEMIA: Primary | ICD-10-CM

## 2023-01-31 NOTE — TELEPHONE ENCOUNTER
PN,  Please see below FotoIN Mobilehart message and advise.  Pended new order (current is )  Thanks,  Kerrie HARRISON RN

## 2023-02-01 NOTE — TELEPHONE ENCOUNTER
Please print LIPID order and fax to [facility] so he can have drawn for his blood draw on Friday -   Thanks  PN

## 2023-02-03 ENCOUNTER — TRANSFERRED RECORDS (OUTPATIENT)
Dept: HEALTH INFORMATION MANAGEMENT | Facility: CLINIC | Age: 53
End: 2023-02-03
Payer: COMMERCIAL

## 2023-02-08 DIAGNOSIS — L98.9 SKIN LESION OF FOOT: ICD-10-CM

## 2023-02-08 DIAGNOSIS — L85.9 HYPERKERATOSIS: ICD-10-CM

## 2023-02-08 DIAGNOSIS — L85.3 DRY SKIN: ICD-10-CM

## 2023-02-08 RX ORDER — AMMONIUM LACTATE 12 G/100G
CREAM TOPICAL 2 TIMES DAILY
Qty: 385 G | Refills: 3 | Status: SHIPPED | OUTPATIENT
Start: 2023-02-08

## 2023-02-08 NOTE — TELEPHONE ENCOUNTER
[facility] Ammonium Lactate 12@% cream 385GM. Patient last seen 12/17/2021. Please advise on refill.

## 2023-03-05 ENCOUNTER — MYC MEDICAL ADVICE (OUTPATIENT)
Dept: FAMILY MEDICINE | Facility: CLINIC | Age: 53
End: 2023-03-05
Payer: COMMERCIAL

## 2023-03-05 DIAGNOSIS — E78.2 MIXED HYPERLIPIDEMIA: Primary | ICD-10-CM

## 2023-05-05 ENCOUNTER — TRANSFERRED RECORDS (OUTPATIENT)
Dept: HEALTH INFORMATION MANAGEMENT | Facility: CLINIC | Age: 53
End: 2023-05-05
Payer: COMMERCIAL

## 2023-08-04 ENCOUNTER — TRANSFERRED RECORDS (OUTPATIENT)
Dept: HEALTH INFORMATION MANAGEMENT | Facility: CLINIC | Age: 53
End: 2023-08-04
Payer: COMMERCIAL

## 2023-09-16 ENCOUNTER — HEALTH MAINTENANCE LETTER (OUTPATIENT)
Age: 53
End: 2023-09-16

## 2023-09-23 ASSESSMENT — ENCOUNTER SYMPTOMS
JOINT SWELLING: 0
DIARRHEA: 0
HEMATURIA: 0
NERVOUS/ANXIOUS: 0
HEARTBURN: 0
ABDOMINAL PAIN: 0
FEVER: 0
HEADACHES: 0
DYSURIA: 0
COUGH: 0
SHORTNESS OF BREATH: 0
CHILLS: 0
PALPITATIONS: 0
DIZZINESS: 0
FREQUENCY: 0
HEMATOCHEZIA: 0
ARTHRALGIAS: 0
PARESTHESIAS: 0
CONSTIPATION: 0
NAUSEA: 0
WEAKNESS: 0
MYALGIAS: 0
EYE PAIN: 0
SORE THROAT: 0

## 2023-09-24 ENCOUNTER — MYC MEDICAL ADVICE (OUTPATIENT)
Dept: FAMILY MEDICINE | Facility: CLINIC | Age: 53
End: 2023-09-24
Payer: COMMERCIAL

## 2023-09-24 DIAGNOSIS — Z12.5 SCREENING FOR PROSTATE CANCER: Primary | ICD-10-CM

## 2023-09-24 DIAGNOSIS — E78.2 MIXED HYPERLIPIDEMIA: ICD-10-CM

## 2023-09-24 DIAGNOSIS — R73.09 ELEVATED GLUCOSE: ICD-10-CM

## 2023-09-24 NOTE — LETTER
LAB ORDER        Sinan Rodriguez   : 1970   MRN: [id number]         Lipid panel Fasting - dx hyperlipidemia    A1c - dx elevated glucose    PSA screen - dx screen prostate cancer        Please fax results to [phone]          Jyotsna Mora DO  Family Medicine PCP

## 2023-09-26 NOTE — TELEPHONE ENCOUNTER
Ordered LIPID, A1c and PSA -   Please fax order to [facility]     Also wrote letter with orders to fax      Thanks  PN

## 2023-09-26 NOTE — TELEPHONE ENCOUNTER
Faxed lab orders and Letter  to [facility] Dr Minh Simpson  at [phone]  [facility] Coordinator

## 2023-09-29 ENCOUNTER — OFFICE VISIT (OUTPATIENT)
Dept: FAMILY MEDICINE | Facility: CLINIC | Age: 53
End: 2023-09-29
Payer: COMMERCIAL

## 2023-09-29 VITALS
SYSTOLIC BLOOD PRESSURE: 107 MMHG | TEMPERATURE: 97.3 F | HEART RATE: 59 BPM | HEIGHT: 75 IN | DIASTOLIC BLOOD PRESSURE: 80 MMHG | WEIGHT: 268.9 LBS | BODY MASS INDEX: 33.43 KG/M2 | RESPIRATION RATE: 19 BRPM | OXYGEN SATURATION: 98 %

## 2023-09-29 DIAGNOSIS — E03.2 HYPOTHYROIDISM DUE TO MEDICATION: ICD-10-CM

## 2023-09-29 DIAGNOSIS — R73.03 PREDIABETES: ICD-10-CM

## 2023-09-29 DIAGNOSIS — C64.9 METASTATIC RENAL CELL CARCINOMA TO BONE (H): ICD-10-CM

## 2023-09-29 DIAGNOSIS — Z11.3 SCREEN FOR STD (SEXUALLY TRANSMITTED DISEASE): ICD-10-CM

## 2023-09-29 DIAGNOSIS — T78.2XXS ANAPHYLAXIS, SEQUELA: ICD-10-CM

## 2023-09-29 DIAGNOSIS — R73.09 ELEVATED GLUCOSE: ICD-10-CM

## 2023-09-29 DIAGNOSIS — K21.9 GASTROESOPHAGEAL REFLUX DISEASE WITHOUT ESOPHAGITIS: ICD-10-CM

## 2023-09-29 DIAGNOSIS — C79.51 METASTATIC RENAL CELL CARCINOMA TO BONE (H): ICD-10-CM

## 2023-09-29 DIAGNOSIS — Z00.00 ROUTINE GENERAL MEDICAL EXAMINATION AT A HEALTH CARE FACILITY: Primary | ICD-10-CM

## 2023-09-29 DIAGNOSIS — Z12.5 SCREENING FOR PROSTATE CANCER: ICD-10-CM

## 2023-09-29 DIAGNOSIS — I10 ESSENTIAL HYPERTENSION: ICD-10-CM

## 2023-09-29 DIAGNOSIS — G47.00 INSOMNIA, UNSPECIFIED TYPE: ICD-10-CM

## 2023-09-29 DIAGNOSIS — E78.2 MIXED HYPERLIPIDEMIA: ICD-10-CM

## 2023-09-29 LAB — HBA1C MFR BLD: NORMAL %

## 2023-09-29 PROCEDURE — 87389 HIV-1 AG W/HIV-1&-2 AB AG IA: CPT | Performed by: FAMILY MEDICINE

## 2023-09-29 PROCEDURE — 90471 IMMUNIZATION ADMIN: CPT | Performed by: FAMILY MEDICINE

## 2023-09-29 PROCEDURE — 90682 RIV4 VACC RECOMBINANT DNA IM: CPT | Performed by: FAMILY MEDICINE

## 2023-09-29 PROCEDURE — 36415 COLL VENOUS BLD VENIPUNCTURE: CPT | Performed by: FAMILY MEDICINE

## 2023-09-29 PROCEDURE — 86803 HEPATITIS C AB TEST: CPT | Performed by: FAMILY MEDICINE

## 2023-09-29 PROCEDURE — 86780 TREPONEMA PALLIDUM: CPT | Performed by: FAMILY MEDICINE

## 2023-09-29 PROCEDURE — 87591 N.GONORRHOEAE DNA AMP PROB: CPT | Performed by: FAMILY MEDICINE

## 2023-09-29 PROCEDURE — 91320 SARSCV2 VAC 30MCG TRS-SUC IM: CPT | Performed by: FAMILY MEDICINE

## 2023-09-29 PROCEDURE — 90480 ADMN SARSCOV2 VAC 1/ONLY CMP: CPT | Performed by: FAMILY MEDICINE

## 2023-09-29 PROCEDURE — 87491 CHLMYD TRACH DNA AMP PROBE: CPT | Performed by: FAMILY MEDICINE

## 2023-09-29 PROCEDURE — 99396 PREV VISIT EST AGE 40-64: CPT | Mod: 25 | Performed by: FAMILY MEDICINE

## 2023-09-29 RX ORDER — ZOLPIDEM TARTRATE 10 MG/1
10 TABLET ORAL
Qty: 30 TABLET | Refills: 1 | Status: SHIPPED | OUTPATIENT
Start: 2023-09-29 | End: 2024-09-25

## 2023-09-29 RX ORDER — AMLODIPINE BESYLATE 10 MG/1
10 TABLET ORAL DAILY
Qty: 90 TABLET | Refills: 3 | Status: SHIPPED | OUTPATIENT
Start: 2023-09-29 | End: 2024-09-13

## 2023-09-29 RX ORDER — ROSUVASTATIN CALCIUM 20 MG/1
TABLET, COATED ORAL
Qty: 90 TABLET | Refills: 3 | Status: SHIPPED | OUTPATIENT
Start: 2023-09-29 | End: 2024-09-24

## 2023-09-29 RX ORDER — LOSARTAN POTASSIUM 25 MG/1
75 TABLET ORAL DAILY
Qty: 270 TABLET | Refills: 3 | Status: SHIPPED | OUTPATIENT
Start: 2023-09-29

## 2023-09-29 RX ORDER — EPINEPHRINE 0.3 MG/.3ML
0.3 INJECTION SUBCUTANEOUS
Qty: 1 EACH | Refills: 0 | Status: SHIPPED | OUTPATIENT
Start: 2023-09-29 | End: 2024-09-09

## 2023-09-29 ASSESSMENT — ENCOUNTER SYMPTOMS
DIZZINESS: 0
HEARTBURN: 0
WEAKNESS: 0
NAUSEA: 0
PALPITATIONS: 0
FEVER: 0
JOINT SWELLING: 0
PARESTHESIAS: 0
SHORTNESS OF BREATH: 0
NERVOUS/ANXIOUS: 0
CONSTIPATION: 0
HEADACHES: 0
DIARRHEA: 0
DYSURIA: 0
HEMATOCHEZIA: 0
ABDOMINAL PAIN: 0
HEMATURIA: 0
EYE PAIN: 0
COUGH: 0
ARTHRALGIAS: 0
SORE THROAT: 0
FREQUENCY: 0
MYALGIAS: 0
CHILLS: 0

## 2023-09-29 ASSESSMENT — ANXIETY QUESTIONNAIRES
5. BEING SO RESTLESS THAT IT IS HARD TO SIT STILL: NOT AT ALL
GAD7 TOTAL SCORE: 0
7. FEELING AFRAID AS IF SOMETHING AWFUL MIGHT HAPPEN: NOT AT ALL
GAD7 TOTAL SCORE: 0
2. NOT BEING ABLE TO STOP OR CONTROL WORRYING: NOT AT ALL
1. FEELING NERVOUS, ANXIOUS, OR ON EDGE: NOT AT ALL
3. WORRYING TOO MUCH ABOUT DIFFERENT THINGS: NOT AT ALL
4. TROUBLE RELAXING: NOT AT ALL
6. BECOMING EASILY ANNOYED OR IRRITABLE: NOT AT ALL

## 2023-09-29 ASSESSMENT — PATIENT HEALTH QUESTIONNAIRE - PHQ9
SUM OF ALL RESPONSES TO PHQ QUESTIONS 1-9: 0
SUM OF ALL RESPONSES TO PHQ QUESTIONS 1-9: 0

## 2023-09-29 ASSESSMENT — PAIN SCALES - GENERAL: PAINLEVEL: NO PAIN (0)

## 2023-09-29 NOTE — PROGRESS NOTES
SUBJECTIVE:   CC: Sinan is an 52 year old who presents for preventative health visit.        No data to display                Healthy Habits:     Getting at least 3 servings of Calcium per day:  Yes    Bi-annual eye exam:  Yes    Dental care twice a year:  Yes    Sleep apnea or symptoms of sleep apnea:  None    Diet:  Regular (no restrictions)    Frequency of exercise:  2-3 days/week    Duration of exercise:  30-45 minutes    Taking medications regularly:  No    Barriers to taking medications:  None    Medication side effects:  None    Additional concerns today:  No      Today's PHQ-9 Score:       9/29/2023     9:57 AM   PHQ-9 SCORE   PHQ-9 Total Score MyChart 0   PHQ-9 Total Score 0       Lipids still pending  A1c pending      CMP last months -         -------------------------------------      Social History     Tobacco Use    Smoking status: Former     Types: Cigars, Dip, chew, snus or snuff    Smokeless tobacco: Former    Tobacco comments:     Occasional cigar.  Occasional swedish snus.   Substance Use Topics    Alcohol use: Yes     Comment: Mostly wine.  3-4 per week.             9/23/2023    12:26 PM   Alcohol Use   Prescreen: >3 drinks/day or >7 drinks/week? No       Last PSA:   PSA   Date Value Ref Range Status   04/28/2021 2.41 0 - 4 ug/L Final     Comment:     Assay Method:  Chemiluminescence using Siemens Vista analyzer       Reviewed orders with patient. Reviewed health maintenance and updated orders accordingly - Yes  Patient Active Problem List   Diagnosis    Chondromalacia of patella    Knee pain    Low back pain    Essential hypertension    Clot    Mixed hyperlipidemia    Gastroesophageal reflux disease without esophagitis    Other insomnia    Lumbar herniated disc    Ineffective esophageal motility    Obesity, unspecified    Prediabetes    Cholelithiasis    Varicose veins of other specified sites    Metastatic renal cell carcinoma to bone (H)    Hypothyroidism due to medication    Elevated glucose      Past Surgical History:   Procedure Laterality Date    COLONOSCOPY N/A 8/23/2019    Procedure: COLONOSCOPY;  Surgeon: Silvano Segovia MD;  Location:  GI    KNEE SURGERY Bilateral     scope multiple - most recent 2014    ORTHOPEDIC SURGERY      STRIP VEIN BILATERAL Bilateral 2015       Social History     Tobacco Use    Smoking status: Former     Types: Cigars, Dip, chew, snus or snuff    Smokeless tobacco: Former    Tobacco comments:     Occasional cigar.  Occasional swedish snus.   Substance Use Topics    Alcohol use: Yes     Comment: Mostly wine.  3-4 per week.     Family History   Problem Relation Age of Onset    Cholecystitis Father 67        passed away - gangrene    Depression Father     Cancer Brother         arm - sarcoma in HS    Coronary Artery Disease Paternal Grandfather     Hypertension Paternal Grandfather     Hyperlipidemia Paternal Grandfather     Asthma Paternal Grandmother     Osteoporosis Paternal Grandmother            Reviewed and updated as needed this visit by clinical staff                  Reviewed and updated as needed this visit by Provider                     Review of Systems   Constitutional:  Negative for chills and fever.   HENT:  Negative for congestion, ear pain, hearing loss and sore throat.    Eyes:  Negative for pain and visual disturbance.   Respiratory:  Negative for cough and shortness of breath.    Cardiovascular:  Negative for chest pain, palpitations and peripheral edema.   Gastrointestinal:  Negative for abdominal pain, constipation, diarrhea, heartburn, hematochezia and nausea.   Genitourinary:  Negative for dysuria, frequency, genital sores, hematuria, impotence, penile discharge and urgency.   Musculoskeletal:  Negative for arthralgias, joint swelling and myalgias.   Skin:  Negative for rash.   Neurological:  Negative for dizziness, weakness, headaches and paresthesias.   Psychiatric/Behavioral:  Negative for mood changes. The patient is not nervous/anxious.           OBJECTIVE:   There were no vitals taken for this visit.    Physical Exam  GENERAL: alert and no distress  EYES: Eyes grossly normal to inspection, PERRL and conjunctivae and sclerae normal  HENT: ear canals and TM's normal, nose and mouth without ulcers or lesions  NECK: no adenopathy, no asymmetry, masses, or scars and thyroid normal to palpation  RESP: lungs clear to auscultation - no rales, rhonchi or wheezes  CV: regular rate and rhythm, normal S1 S2, no S3 or S4, no murmur, click or rub, no peripheral edema and peripheral pulses strong  ABDOMEN: soft, nontender, no hepatosplenomegaly, no masses and bowel sounds normal  MS: no gross musculoskeletal defects noted, no edema  SKIN: no suspicious lesions or rashes  NEURO: Normal strength and tone, mentation intact and speech normal  PSYCH: mentation appears normal, affect normal/bright    Diagnostic Test Results:  Labs reviewed in Epic    ASSESSMENT/PLAN:   (Z00.00) Routine general medical examination at a health care facility  (primary encounter diagnosis)  Comment:    Plan:      (E03.2) Hypothyroidism due to medication  Comment:    Plan: CANCELED: TSH WITH FREE T4 REFLEX             (G47.00) Insomnia, unspecified type  Comment:    Plan: zolpidem (AMBIEN) 10 MG tablet             (E78.2) Mixed hyperlipidemia  Comment:    Plan: rosuvastatin (CRESTOR) 20 MG tablet             (K21.9) Gastroesophageal reflux disease without esophagitis  Comment:    Plan: omeprazole (PRILOSEC) 20 MG DR capsule             (I10) Essential hypertension  Comment:    Plan: losartan (COZAAR) 25 MG tablet, amLODIPine         (NORVASC) 10 MG tablet             (Z11.3) Screen for STD (sexually transmitted disease)  Comment:    Plan: NEISSERIA GONORRHOEA PCR, CHLAMYDIA TRACHOMATIS        PCR, HIV Antigen Antibody Combo, Hepatitis C         Screen Reflex to HCV RNA Quant and Genotype,         Treponema Abs w Reflex to RPR and Titer             (R73.03) Prediabetes  Comment:    Plan:       (T78.2XXS) Anaphylaxis, sequela  Comment:    Plan: EPINEPHrine (EPIPEN 2-CHERELLE) 0.3 MG/0.3ML         injection 2-pack             (R73.09) Elevated glucose  Comment:    Plan:      (Z12.5) Screening for prostate cancer  Comment:    Plan:      (C79.51,  C64.9) Metastatic renal cell carcinoma to bone (H)  Comment:    Plan:            COUNSELING:   Reviewed preventive health counseling, as reflected in patient instructions        He reports that he has quit smoking. His smoking use included cigars and dip, chew, snus or snuff. He has quit using smokeless tobacco.            [facility] submitted by the patient for this visit:  Patient Health Questionnaire (Submitted on 9/29/2023)  PHQ9 TOTAL SCORE: 0  DANUTA-7 (Submitted on 9/29/2023)  DANUTA 7 TOTAL SCORE: 0

## 2023-09-29 NOTE — NURSING NOTE
Prior to immunization administration, verified patients identity using patient s name and date of birth. Please see Immunization Activity for additional information.     Screening Questionnaire for Adult Immunization    Are you sick today?   No   Do you have allergies to medications, food, a vaccine component or latex?   No   Have you ever had a serious reaction after receiving a vaccination?   No   Do you have a long-term health problem with heart, lung, kidney, or metabolic disease (e.g., diabetes), asthma, a blood disorder, no spleen, complement component deficiency, a cochlear implant, or a spinal fluid leak?  Are you on long-term aspirin therapy?   No   Do you have cancer, leukemia, HIV/AIDS, or any other immune system problem?   No   Do you have a parent, brother, or sister with an immune system problem?   No   In the past 3 months, have you taken medications that affect  your immune system, such as prednisone, other steroids, or anticancer drugs; drugs for the treatment of rheumatoid arthritis, Crohn s disease, or psoriasis; or have you had radiation treatments?   No   Have you had a seizure, or a brain or other nervous system problem?   No   During the past year, have you received a transfusion of blood or blood    products, or been given immune (gamma) globulin or antiviral drug?   No   For women: Are you pregnant or is there a chance you could become       pregnant during the next month?   No   Have you received any vaccinations in the past 4 weeks?   No     Immunization questionnaire answers were all negative.      Patient instructed to remain in clinic for 15 minutes afterwards, and to report any adverse reactions.     Screening performed by Tj Romero MA on 9/29/2023 at 11:56 AM.

## 2023-09-30 LAB
C TRACH DNA SPEC QL NAA+PROBE: NEGATIVE
HCV AB SERPL QL IA: NONREACTIVE
HIV 1+2 AB+HIV1 P24 AG SERPL QL IA: NONREACTIVE
N GONORRHOEA DNA SPEC QL NAA+PROBE: NEGATIVE
T PALLIDUM AB SER QL: NONREACTIVE

## 2023-10-03 NOTE — RESULT ENCOUNTER NOTE
Dear Sinan,   Your test results are all back -   -All of your labs are normal.  Let us know if you have any questions.  -Jyotsna Mora, DO

## 2023-10-09 ENCOUNTER — MYC MEDICAL ADVICE (OUTPATIENT)
Dept: FAMILY MEDICINE | Facility: CLINIC | Age: 53
End: 2023-10-09

## 2023-10-09 ENCOUNTER — OFFICE VISIT (OUTPATIENT)
Dept: PODIATRY | Facility: CLINIC | Age: 53
End: 2023-10-09
Payer: COMMERCIAL

## 2023-10-09 VITALS
WEIGHT: 268 LBS | BODY MASS INDEX: 33.32 KG/M2 | SYSTOLIC BLOOD PRESSURE: 132 MMHG | DIASTOLIC BLOOD PRESSURE: 83 MMHG | HEIGHT: 75 IN

## 2023-10-09 DIAGNOSIS — L98.9 SKIN LESION OF FOOT: ICD-10-CM

## 2023-10-09 DIAGNOSIS — L85.9 HYPERKERATOSIS: Primary | ICD-10-CM

## 2023-10-09 DIAGNOSIS — R97.20 ELEVATED PROSTATE SPECIFIC ANTIGEN (PSA): Primary | ICD-10-CM

## 2023-10-09 PROCEDURE — 99213 OFFICE O/P EST LOW 20 MIN: CPT | Mod: 25 | Performed by: PODIATRIST

## 2023-10-09 PROCEDURE — 11056 PARNG/CUTG B9 HYPRKR LES 2-4: CPT | Performed by: PODIATRIST

## 2023-10-09 RX ORDER — AMMONIUM LACTATE 12 G/100G
CREAM TOPICAL 2 TIMES DAILY
Qty: 385 G | Refills: 5 | Status: SHIPPED | OUTPATIENT
Start: 2023-10-09 | End: 2024-07-20

## 2023-10-09 NOTE — LETTER
"    10/9/2023         RE: Sinan Rodriguez  [address]  [place] [address]        Dear Colleague,    Thank you for referring your patient, Sinan Rodriguez, to the [facility]. Please see a copy of my visit note below.    Foot & Ankle Surgery   October 9, 2023    S:  Pt is seen today for evaluation of painful hyperkeratotic lesions.  He is traveling to [facility] in the near future and would like these addressed.    Vitals:    10/09/23 1611   BP: 132/83   Weight: 121.6 kg (268 lb)   Height: 1.905 m (6' 3\")   '      ROS - Pos for CC.  Patient denies current nausea, vomiting, chills, fevers, belly pain, calf pain, chest pain or SOB.  Complete remainder of ROS it otherwise neg.      PE:  Gen:   No apparent distress  Eye:    Visual scanning without deficit  Ear:    Response to auditory stimuli wnl  Lung:    Non-labored breathing on RA noted  Abd:    NTND per patient report  Lymph:    Neg for pitting/non-pitting edema BLE  Vasc:    Pulses palpable, CFT minimally delayed  Neuro:    Light touch sensation intact to all sensory nerve distributions without paresthesias  Derm:    Neg for nodules, lesions or ulcerations  MSK:    Painful nucleated hyperkeratotic lesion subthird and fifth MPJ left foot.  There is also a chain of painful hyperkeratotic lesions along the lateral midfoot right more pronounced than left  Calf:    Neg for redness, swelling or tenderness    Assessment:  52 year old male with painful nucleated hyperkeratotic lesions bilateral      Medical Decision Making/Plan:  Discussed etiologies, anatomy and options  1.  Painful nucleated hyperkeratotic lesions bilateral  -The lesions were debrided with a 15 blade greater than 4  -Prescription for Lac-Hydrin 385 g tube with 5 refills  -Consider dermatology    Follow up: As needed or sooner with acute issues           Mike Calix DPM FACFAS FACFAOM  Podiatric Foot & Ankle Surgeon  [facility] " [facility]  [phone]    Disclaimer: This note consists of symbols derived from keyboarding, dictation and/or voice recognition software. As a result, there may be errors in the script that have gone undetected. Please consider this when interpreting information found in this chart.        Again, thank you for allowing me to participate in the care of your patient.        Sincerely,        Mike Calix DPM, ROSSI

## 2023-10-09 NOTE — PROGRESS NOTES
"Foot & Ankle Surgery   October 9, 2023    S:  Pt is seen today for evaluation of painful hyperkeratotic lesions.  He is traveling to [facility] in the near future and would like these addressed.    Vitals:    10/09/23 1611   BP: 132/83   Weight: 121.6 kg (268 lb)   Height: 1.905 m (6' 3\")   '      ROS - Pos for CC.  Patient denies current nausea, vomiting, chills, fevers, belly pain, calf pain, chest pain or SOB.  Complete remainder of ROS it otherwise neg.      PE:  Gen:   No apparent distress  Eye:    Visual scanning without deficit  Ear:    Response to auditory stimuli wnl  Lung:    Non-labored breathing on RA noted  Abd:    NTND per patient report  Lymph:    Neg for pitting/non-pitting edema BLE  Vasc:    Pulses palpable, CFT minimally delayed  Neuro:    Light touch sensation intact to all sensory nerve distributions without paresthesias  Derm:    Neg for nodules, lesions or ulcerations  MSK:    Painful nucleated hyperkeratotic lesion subthird and fifth MPJ left foot.  There is also a chain of painful hyperkeratotic lesions along the lateral midfoot right more pronounced than left  Calf:    Neg for redness, swelling or tenderness    Assessment:  52 year old male with painful nucleated hyperkeratotic lesions bilateral      Medical Decision Making/Plan:  Discussed etiologies, anatomy and options  1.  Painful nucleated hyperkeratotic lesions bilateral  -The lesions were debrided with a 15 blade greater than 4  -Prescription for Lac-Hydrin 385 g tube with 5 refills  -Consider dermatology    Follow up: As needed or sooner with acute issues           Mike Calix, ROSSI FACFAS FACFAOM  Podiatric Foot & Ankle Surgeon  [facility]  [phone]    Disclaimer: This note consists of symbols derived from keyboarding, dictation and/or voice recognition software. As a result, there may be errors in the script that have gone undetected. Please consider this when interpreting information found in this " chart.       Low Risk (score 7-11)

## 2023-10-20 NOTE — TELEPHONE ENCOUNTER
Action 2023 JTV 12:04 PM    Action Taken CSS sent an urgent request to [facility] for images to be pushed.      Action 2023 JTV 4:41 PM    Action Taken CSS called patient. Patient did not . CSS LVM for patient to return call.     CSS [facility] and [facility]      Action 2023 JTV 10:14 AM    Action Taken CSS sent an urgent request to [facility] for images.      Action 2023 JTV 12:45 PM    Action Taken CSS called patient. Patient did not . CSS LVM for patient to return call.      Action 2023 jtv 2:11 PM    Action Taken CSS sent an urgent request to [facility] for records.     @4:06 PM, [facility] responded stating that patient hasn't been seen since .     @4:13 PM, CSS called patient. Patient did not . CSS LVM for patient to return call. -- Max attempt reached.     MEDICAL RECORDS REQUEST   [facility]  [facility] Clinic  [address] [place] [id number]  PHONE: [phone]  Fax: [phone]        FUTURE VISIT INFORMATION                                                   Sinan Rodriguez, : 1970 scheduled for future visit at [facility]    APPOINTMENT INFORMATION:  Date: 2023  Provider:  Katherine Bryant MD  Reason for Visit/Diagnosis: elevated psa    REFERRAL INFORMATION:  Referring provider:  Jyotsna Mora DO in [facility]      RECORDS REQUESTED FOR VISIT                                                     NOTES  STATUS/DETAILS   OFFICE NOTE from referring provider  yes, 2023 -- [facility]   OFFICE NOTE from other specialist  yes   MEDICATION LIST  yes   PROSTATE CANCER     images  [facility]  2023 -- PET CT WHOLE BODY   PSA (LAB)  YES, 2021, 2019     PRE-VISIT CHECKLIST      Joint diagnostic appointment coordinated correctly          (ensure right order & amount of time) Yes   RECORD COLLECTION COMPLETE yes

## 2023-11-09 ENCOUNTER — OFFICE VISIT (OUTPATIENT)
Dept: ONCOLOGY | Facility: CLINIC | Age: 53
End: 2023-11-09
Attending: FAMILY MEDICINE
Payer: COMMERCIAL

## 2023-11-09 VITALS
HEART RATE: 87 BPM | RESPIRATION RATE: 16 BRPM | OXYGEN SATURATION: 96 % | HEIGHT: 75 IN | WEIGHT: 265 LBS | TEMPERATURE: 97.3 F | BODY MASS INDEX: 32.95 KG/M2 | DIASTOLIC BLOOD PRESSURE: 83 MMHG | SYSTOLIC BLOOD PRESSURE: 122 MMHG

## 2023-11-09 DIAGNOSIS — R97.20 ELEVATED PROSTATE SPECIFIC ANTIGEN (PSA): ICD-10-CM

## 2023-11-09 PROCEDURE — 99213 OFFICE O/P EST LOW 20 MIN: CPT | Performed by: UROLOGY

## 2023-11-09 PROCEDURE — 99204 OFFICE O/P NEW MOD 45 MIN: CPT | Performed by: UROLOGY

## 2023-11-09 ASSESSMENT — PAIN SCALES - GENERAL: PAINLEVEL: MILD PAIN (3)

## 2023-11-09 NOTE — PROGRESS NOTES
Chief Complaint:   Elevated PSA          Consult or Referral:     Mr. Sinan Rodriguez is a 52 year old male seen in consultation from Dr. Mora.         History of Present Illness:    Sinan Rodriguez is a very pleasant 52 year old male with past medical history of stage IV renal cell carcinoma currently on nivolumab and cabazitaxel in remission who was found to have an elevated PSA of 4.8 almost a month ago. He denies any major lower urinary symptoms (AUASI 3). 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  ECOG status 0 -1          Past Medical History:     Past Medical History:   Diagnosis Date    Cancer (H) 2/5/20    Hypertension             Past Surgical History:     Past Surgical History:   Procedure Laterality Date    COLONOSCOPY N/A 8/23/2019    Procedure: COLONOSCOPY;  Surgeon: Silvano Segovia MD;  Location:  GI    KNEE SURGERY Bilateral     scope multiple - most recent 2014    ORTHOPEDIC SURGERY      STRIP VEIN BILATERAL Bilateral 2015            Medications     Current Outpatient Medications   Medication    amLODIPine (NORVASC) 10 MG tablet    ammonium lactate (AMLACTIN) 12 % external cream    ammonium lactate (AMLACTIN) 12 % external cream    aspirin (ASPIRIN ADULT) 325 MG tablet    Cabozantinib S-Malate (CABOMETYX) 60 MG    cyclobenzaprine (FLEXERIL) 10 MG tablet    EPINEPHrine (EPIPEN 2-CHERELLE) 0.3 MG/0.3ML injection 2-pack    levothyroxine (SYNTHROID/LEVOTHROID) 50 MCG tablet    losartan (COZAAR) 25 MG tablet    Nivolumab (OPDIVO IV)    omeprazole (PRILOSEC) 20 MG DR capsule    rosuvastatin (CRESTOR) 20 MG tablet    zoledronic acid (ZOMETA) 4 MG/100ML SOLN    zolpidem (AMBIEN) 10 MG tablet     No current facility-administered medications for this visit.            Family History:     Family History   Problem Relation Age of Onset    Cholecystitis Father 67        passed away - gangrene    Depression Father      Cancer Brother         arm - sarcoma in HS    Coronary Artery Disease Paternal Grandfather     Hypertension Paternal Grandfather     Hyperlipidemia Paternal Grandfather     Asthma Paternal Grandmother     Osteoporosis Paternal Grandmother             Social History:     Social History     Socioeconomic History    Marital status: Single     Spouse name: Not on file    Number of children: Not on file    Years of education: Not on file    Highest education level: Not on file   Occupational History    Not on file   Tobacco Use    Smoking status: Former     Types: Cigars, Dip, chew, snus or snuff    Smokeless tobacco: Former    Tobacco comments:     Occasional cigar.  Occasional swedish snus.   Substance and Sexual Activity    Alcohol use: Yes     Comment: Mostly wine.  3-4 per week.    Drug use: No    Sexual activity: Yes     Partners: Female     Birth control/protection: None   Other Topics Concern    Parent/sibling w/ CABG, MI or angioplasty before 65F 55M? No   Social History Narrative    Not on file     Social Determinants of Health     Financial Resource Strain: Low Risk  (9/23/2023)    Financial Resource Strain     Within the past 12 months, have you or your family members you live with been unable to get utilities (heat, electricity) when it was really needed?: No   Food Insecurity: Low Risk  (9/23/2023)    Food Insecurity     Within the past 12 months, did you worry that your food would run out before you got money to buy more?: No     Within the past 12 months, did the food you bought just not last and you didn t have money to get more?: No   Transportation Needs: Low Risk  (9/23/2023)    Transportation Needs     Within the past 12 months, has lack of transportation kept you from medical appointments, getting your medicines, non-medical meetings or appointments, work, or from getting things that you need?: No   Physical Activity: Not on file   Stress: Not on file   Social Connections: Not on file  "  Interpersonal Safety: Low Risk  (9/29/2023)    Interpersonal Safety     Do you feel physically and emotionally safe where you currently live?: Yes     Within the past 12 months, have you been hit, slapped, kicked or otherwise physically hurt by someone?: No     Within the past 12 months, have you been humiliated or emotionally abused in other ways by your partner or ex-partner?: No   Housing Stability: Low Risk  (9/23/2023)    Housing Stability     Do you have housing? : Yes     Are you worried about losing your housing?: No            Allergies:   Atorvastatin, Erythromycin, and Phenobarbital         Review of Systems     10 point ROS of systems including Constitutional, Eyes, Respiratory, Cardiovascular, Gastroenterology, Genitourinary, Integumentary, Muscularskeletal, Psychiatric were all negative except for pertinent positives noted in my HPI.          Physical Exam:     Patient is a 52 year old  male Body mass index is 33.12 kg/m .,  Vitals: Blood pressure 122/83, pulse 87, temperature 97.3  F (36.3  C), temperature source Tympanic, resp. rate 16, height 1.905 m (6' 3\"), weight 120.2 kg (265 lb), SpO2 96%.  General Appearance Adult: Alert, no acute distress, oriented  HENT: throat/mouth:normal, good dentition  Neck: No adenopathy,masses or thyromegaly  Lungs: no respiratory distress, or pursed lip breathing  Heart: No obvious jugular venous distension present  Abdomen: soft, nontender, no organomegaly or masses  Lymphatics: No cervical or supraclavicular adenopathy  Musculoskeltal: extremities normal, no peripheral edema  Skin: no suspicious lesions or rashes  Neuro: Alert, oriented, speech and mentation normal  Psych: affect and mood normal  Gait: Normal  : deferred      Labs and Pathology:    I reviewed all applicable laboratory and pathology data and went over findings with patient  Significant for       Outside lab 10/15/2023    PSA    4.8        Lab Results   Component Value Date/Time    PSA 2.41 " 04/28/2021 09:26 AM    PSA 2.48 04/26/2019 08:58 AM               Imaging:    No relevant imaging to review today            Assessment and Plan:     Assessment:  52-year-old male with mildly elevated PSA; history of stage IV kidney cancer in remission    We had a long discussion about the utility of PSA screening.  We talked about the Pros and Cons.  We discussed the findings of the PLCO and EORTC studies.  We discussed the results of the [place] trial of treatment vs. observation in clinically detected prostate cancer as well as the results of the PIVOT trial in the context of screen-detected cancer.  We discussed the recommendations by the AUA, and USPSTF. We also discussed the significant (2-6%) and rising risk of biopsy associated sepsis.      We also discussed the emerging role of MRI in the diagnosis of prostate cancer and the potential for performing MRI-Ultrasound Fusion biopsy if suspicious lesions are noted.     We also discussed some of the new methods of risk stratification for prostate cancer including 4K, PHI, Exsome Dx to identify clinically significant and aggressive prostate cancer before biopsy. New data suggest that combination of these tests with MRI could avoid unnecessary prostate biopsy in significant percent of men with elevated PSA. Another available option in this group is MyProstateScore which provided 96% sensitivity and 97% negative predictive value, and would have prevented 32% of unnecessary biopsies, missing 3.7% of grade group ?2 cancers at the levels below 10.      Patient has decided he would like to proceed with prostate MRI      Plan:  Schedule for prostate MRI   Call with the results to discuss the next steps     45 total minutes spent on the date of the encounter including direct interaction with the patient, performing chart review, documentation and further activities as noted above.        Katherine Bryant MD   Department of Urology   [facility]

## 2023-11-09 NOTE — NURSING NOTE
"Oncology Rooming Note    November 9, 2023 8:29 AM   Sinan Rodriguez is a 52 year old male who presents for:    Chief Complaint   Patient presents with    Oncology Clinic Visit     New patient     Initial Vitals: /83 (Cuff Size: Adult Large)   Pulse 87   Temp 97.3  F (36.3  C) (Tympanic)   Resp 16   Ht 1.905 m (6' 3\")   Wt 120.2 kg (265 lb)   SpO2 96%   BMI 33.12 kg/m   Estimated body mass index is 33.12 kg/m  as calculated from the following:    Height as of this encounter: 1.905 m (6' 3\").    Weight as of this encounter: 120.2 kg (265 lb). Body surface area is 2.52 meters squared.  Mild Pain (3) Comment: Data Unavailable   No LMP for male patient.  Allergies reviewed: Yes  Medications reviewed: Yes    Medications: Medication refills not needed today.  Pharmacy name entered into EPIC:    [facility] - [facility], [place] - [address] [place] [id number] [place] [facility] [place] - [address] [facility]ARMACY [place] - [address] [id number] [address] [facility] [address] [facility] [address]    Clinical concerns: elevated PSA       Gissell Rosen CMA              "

## 2023-11-09 NOTE — LETTER
11/9/2023         RE: Sinan Rodriguez  [address]  [place] [address]        Dear Colleague,    Thank you for referring your patient, Sinan Rodriguez, to the [facility]. Please see a copy of my visit note below.          Chief Complaint:   Elevated PSA          Consult or Referral:     Mr. Sinan Rodriguez is a 52 year old male seen in consultation from Dr. Mora.         History of Present Illness:    Sinan Rodriguez is a very pleasant 52 year old male with past medical history of stage IV renal cell carcinoma currently on nivolumab and cabazitaxel in remission who was found to have an elevated PSA of 4.8 almost a month ago. He denies any major lower urinary symptoms (AUASI 3). 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          Past Medical History:     Past Medical History:   Diagnosis Date     Cancer (H) 2/5/20     Hypertension             Past Surgical History:     Past Surgical History:   Procedure Laterality Date     COLONOSCOPY N/A 8/23/2019    Procedure: COLONOSCOPY;  Surgeon: Silvano Segovia MD;  Location:  GI     KNEE SURGERY Bilateral     scope multiple - most recent 2014     ORTHOPEDIC SURGERY       STRIP VEIN BILATERAL Bilateral 2015            Medications     Current Outpatient Medications   Medication     amLODIPine (NORVASC) 10 MG tablet     ammonium lactate (AMLACTIN) 12 % external cream     ammonium lactate (AMLACTIN) 12 % external cream     aspirin (ASPIRIN ADULT) 325 MG tablet     Cabozantinib S-Malate (CABOMETYX) 60 MG     cyclobenzaprine (FLEXERIL) 10 MG tablet     EPINEPHrine (EPIPEN 2-CHERELLE) 0.3 MG/0.3ML injection 2-pack     levothyroxine (SYNTHROID/LEVOTHROID) 50 MCG tablet     losartan (COZAAR) 25 MG tablet     Nivolumab (OPDIVO IV)     omeprazole (PRILOSEC) 20 MG DR capsule     rosuvastatin (CRESTOR) 20 MG tablet     zoledronic acid  (ZOMETA) 4 MG/100ML SOLN     zolpidem (AMBIEN) 10 MG tablet     No current facility-administered medications for this visit.            Family History:     Family History   Problem Relation Age of Onset     Cholecystitis Father 67        passed away - gangrene     Depression Father      Cancer Brother         arm - sarcoma in HS     Coronary Artery Disease Paternal Grandfather      Hypertension Paternal Grandfather      Hyperlipidemia Paternal Grandfather      Asthma Paternal Grandmother      Osteoporosis Paternal Grandmother             Social History:     Social History     Socioeconomic History     Marital status: Single     Spouse name: Not on file     Number of children: Not on file     Years of education: Not on file     Highest education level: Not on file   Occupational History     Not on file   Tobacco Use     Smoking status: Former     Types: Cigars, Dip, chew, snus or snuff     Smokeless tobacco: Former     Tobacco comments:     Occasional cigar.  Occasional swedish snus.   Substance and Sexual Activity     Alcohol use: Yes     Comment: Mostly wine.  3-4 per week.     Drug use: No     Sexual activity: Yes     Partners: Female     Birth control/protection: None   Other Topics Concern     Parent/sibling w/ CABG, MI or angioplasty before 65F 55M? No   Social History Narrative     Not on file     Social Determinants of Health     Financial Resource Strain: Low Risk  (9/23/2023)    Financial Resource Strain      Within the past 12 months, have you or your family members you live with been unable to get utilities (heat, electricity) when it was really needed?: No   Food Insecurity: Low Risk  (9/23/2023)    Food Insecurity      Within the past 12 months, did you worry that your food would run out before you got money to buy more?: No      Within the past 12 months, did the food you bought just not last and you didn t have money to get more?: No   Transportation Needs: Low Risk  (9/23/2023)    Transportation  "Needs      Within the past 12 months, has lack of transportation kept you from medical appointments, getting your medicines, non-medical meetings or appointments, work, or from getting things that you need?: No   Physical Activity: Not on file   Stress: Not on file   Social Connections: Not on file   Interpersonal Safety: Low Risk  (9/29/2023)    Interpersonal Safety      Do you feel physically and emotionally safe where you currently live?: Yes      Within the past 12 months, have you been hit, slapped, kicked or otherwise physically hurt by someone?: No      Within the past 12 months, have you been humiliated or emotionally abused in other ways by your partner or ex-partner?: No   Housing Stability: Low Risk  (9/23/2023)    Housing Stability      Do you have housing? : Yes      Are you worried about losing your housing?: No            Allergies:   Atorvastatin, Erythromycin, and Phenobarbital         Review of Systems     10 point ROS of systems including Constitutional, Eyes, Respiratory, Cardiovascular, Gastroenterology, Genitourinary, Integumentary, Muscularskeletal, Psychiatric were all negative except for pertinent positives noted in my HPI.          Physical Exam:     Patient is a 52 year old  male Body mass index is 33.12 kg/m .,  Vitals: Blood pressure 122/83, pulse 87, temperature 97.3  F (36.3  C), temperature source Tympanic, resp. rate 16, height 1.905 m (6' 3\"), weight 120.2 kg (265 lb), SpO2 96%.  General Appearance Adult: Alert, no acute distress, oriented  HENT: throat/mouth:normal, good dentition  Neck: No adenopathy,masses or thyromegaly  Lungs: no respiratory distress, or pursed lip breathing  Heart: No obvious jugular venous distension present  Abdomen: soft, nontender, no organomegaly or masses  Lymphatics: No cervical or supraclavicular adenopathy  Musculoskeltal: extremities normal, no peripheral edema  Skin: no suspicious lesions or rashes  Neuro: Alert, oriented, speech and mentation " normal  Psych: affect and mood normal  Gait: Normal  : deferred      Labs and Pathology:    I reviewed all applicable laboratory and pathology data and went over findings with patient  Significant for       Outside lab 10/15/2023    PSA    4.8        Lab Results   Component Value Date/Time    PSA 2.41 04/28/2021 09:26 AM    PSA 2.48 04/26/2019 08:58 AM               Imaging:    No relevant imaging to review today            Assessment and Plan:     Assessment:  52-year-old male with mildly elevated PSA; history of stage IV kidney cancer in remission    We had a long discussion about the utility of PSA screening.  We talked about the Pros and Cons.  We discussed the findings of the PLCO and EORTC studies.  We discussed the results of the [place] trial of treatment vs. observation in clinically detected prostate cancer as well as the results of the PIVOT trial in the context of screen-detected cancer.  We discussed the recommendations by the AUA, and USPSTF. We also discussed the significant (2-6%) and rising risk of biopsy associated sepsis.      We also discussed the emerging role of MRI in the diagnosis of prostate cancer and the potential for performing MRI-Ultrasound Fusion biopsy if suspicious lesions are noted.     We also discussed some of the new methods of risk stratification for prostate cancer including 4K, PHI, Exsome Dx to identify clinically significant and aggressive prostate cancer before biopsy. New data suggest that combination of these tests with MRI could avoid unnecessary prostate biopsy in significant percent of men with elevated PSA. Another available option in this group is MyProstateScore which provided 96% sensitivity and 97% negative predictive value, and would have prevented 32% of unnecessary biopsies, missing 3.7% of grade group =2 cancers at the levels below 10.      Patient has decided he would like to proceed with prostate MRI      Plan:  Schedule for prostate MRI   Call with  the results to discuss the next steps     45 total minutes spent on the date of the encounter including direct interaction with the patient, performing chart review, documentation and further activities as noted above.        Katherine Bryant MD   Department of Urology   [facility]       Again, thank you for allowing me to participate in the care of your patient.        Sincerely,        Katherine Bryant MD

## 2023-11-16 ENCOUNTER — PRE VISIT (OUTPATIENT)
Dept: UROLOGY | Facility: CLINIC | Age: 53
End: 2023-11-16
Payer: COMMERCIAL

## 2023-11-17 ENCOUNTER — TRANSFERRED RECORDS (OUTPATIENT)
Dept: HEALTH INFORMATION MANAGEMENT | Facility: CLINIC | Age: 53
End: 2023-11-17
Payer: COMMERCIAL

## 2023-11-20 ENCOUNTER — MYC MEDICAL ADVICE (OUTPATIENT)
Dept: FAMILY MEDICINE | Facility: CLINIC | Age: 53
End: 2023-11-20
Payer: COMMERCIAL

## 2023-11-20 DIAGNOSIS — R73.03 PREDIABETES: Primary | ICD-10-CM

## 2023-11-21 RX ORDER — BLOOD-GLUCOSE METER
1 KIT MISCELLANEOUS DAILY
Qty: 1 KIT | Refills: 0 | Status: SHIPPED | OUTPATIENT
Start: 2023-11-21

## 2023-12-08 ENCOUNTER — HOSPITAL ENCOUNTER (OUTPATIENT)
Dept: MRI IMAGING | Facility: CLINIC | Age: 53
Discharge: HOME | End: 2023-12-08
Attending: UROLOGY | Admitting: UROLOGY
Payer: COMMERCIAL

## 2023-12-08 DIAGNOSIS — R97.20 ELEVATED PROSTATE SPECIFIC ANTIGEN (PSA): ICD-10-CM

## 2023-12-08 PROCEDURE — 72197 MRI PELVIS W/O & W/DYE: CPT | Mod: 26 | Performed by: STUDENT IN AN ORGANIZED HEALTH CARE EDUCATION/TRAINING PROGRAM

## 2023-12-08 PROCEDURE — 255N000002 HC RX 255 OP 636: Performed by: UROLOGY

## 2023-12-08 PROCEDURE — 72197 MRI PELVIS W/O & W/DYE: CPT

## 2023-12-08 PROCEDURE — A9585 GADOBUTROL INJECTION: HCPCS | Performed by: UROLOGY

## 2023-12-08 RX ORDER — GADOBUTROL 604.72 MG/ML
10 INJECTION INTRAVENOUS ONCE
Status: COMPLETED | OUTPATIENT
Start: 2023-12-08 | End: 2023-12-08

## 2023-12-08 RX ADMIN — GADOBUTROL 10 ML: 604.72 INJECTION INTRAVENOUS at 10:02

## 2023-12-12 ENCOUNTER — MYC MEDICAL ADVICE (OUTPATIENT)
Dept: FAMILY MEDICINE | Facility: CLINIC | Age: 53
End: 2023-12-12
Payer: COMMERCIAL

## 2024-02-09 ENCOUNTER — TRANSFERRED RECORDS (OUTPATIENT)
Dept: HEALTH INFORMATION MANAGEMENT | Facility: CLINIC | Age: 54
End: 2024-02-09
Payer: COMMERCIAL

## 2024-02-28 ENCOUNTER — TRANSFERRED RECORDS (OUTPATIENT)
Dept: HEALTH INFORMATION MANAGEMENT | Facility: CLINIC | Age: 54
End: 2024-02-28
Payer: COMMERCIAL

## 2024-03-06 ENCOUNTER — MYC MEDICAL ADVICE (OUTPATIENT)
Dept: FAMILY MEDICINE | Facility: CLINIC | Age: 54
End: 2024-03-06
Payer: COMMERCIAL

## 2024-03-06 DIAGNOSIS — R05.1 ACUTE COUGH: Primary | ICD-10-CM

## 2024-03-07 NOTE — TELEPHONE ENCOUNTER
P.N.,  Second offered appointment also taken before able to schedule  Called patient  Offered same day OV  Declined at this time due to work schedule  Pt unsure why appointment is needed  States this has been prescribed previously by PCP  Discussed benefit of provider listening to lungs/completing assessment prior to prescribing medication  States he currently sees oncology providers in person  And should not need current in person assessment  Patient states he just needs assistance with sleeping  Experiencing 'tickle in throat'   Which is preventing him from sleep  Used Ambien last evening to sleep  States he will keep doing this and will have to wait it out  Advised PCP back in office tomorrow and will forward request to PCP for review at that time  Please advise if evisit/virtual/add on recommended?  Thanks,  Irene FELIZ RN

## 2024-03-08 RX ORDER — CODEINE PHOSPHATE AND GUAIFENESIN 10; 100 MG/5ML; MG/5ML
1-2 SOLUTION ORAL EVERY 4 HOURS PRN
Qty: 237 ML | Refills: 0 | Status: SHIPPED | OUTPATIENT
Start: 2024-03-08 | End: 2024-03-08

## 2024-03-08 RX ORDER — CODEINE PHOSPHATE AND GUAIFENESIN 10; 100 MG/5ML; MG/5ML
1-2 SOLUTION ORAL EVERY 4 HOURS PRN
Qty: 237 ML | Refills: 0 | Status: SHIPPED | OUTPATIENT
Start: 2024-03-08

## 2024-03-08 NOTE — TELEPHONE ENCOUNTER
PN,  Please see below [employer] message and advise.  Can call and cancel at [facility] once signed.  Thanks,  Kerrie HARRISON RN

## 2024-03-19 ENCOUNTER — MYC MEDICAL ADVICE (OUTPATIENT)
Dept: FAMILY MEDICINE | Facility: CLINIC | Age: 54
End: 2024-03-19
Payer: COMMERCIAL

## 2024-03-19 DIAGNOSIS — R73.03 PREDIABETES: Primary | ICD-10-CM

## 2024-03-19 NOTE — TELEPHONE ENCOUNTER
PN,      Please see below MyChart message and advise.  A1C 6.0 on 9/29/2023  Suggested A1C followup in 3-6 months per MyChart 10/9/2023      Thanks,  Dedrick LARA RN

## 2024-04-10 ENCOUNTER — MYC MEDICAL ADVICE (OUTPATIENT)
Dept: FAMILY MEDICINE | Facility: CLINIC | Age: 54
End: 2024-04-10
Payer: COMMERCIAL

## 2024-05-10 ENCOUNTER — TRANSFERRED RECORDS (OUTPATIENT)
Dept: HEALTH INFORMATION MANAGEMENT | Facility: CLINIC | Age: 54
End: 2024-05-10
Payer: COMMERCIAL

## 2024-07-20 ENCOUNTER — E-VISIT (OUTPATIENT)
Dept: URGENT CARE | Facility: CLINIC | Age: 54
End: 2024-07-20
Payer: COMMERCIAL

## 2024-07-20 ENCOUNTER — VIRTUAL VISIT (OUTPATIENT)
Dept: URGENT CARE | Facility: CLINIC | Age: 54
End: 2024-07-20
Payer: COMMERCIAL

## 2024-07-20 DIAGNOSIS — U07.1 SARS-COV-2 POSITIVE: Primary | ICD-10-CM

## 2024-07-20 PROBLEM — I82.811: Status: ACTIVE | Noted: 2020-07-28

## 2024-07-20 PROBLEM — C78.00 MALIGNANT NEOPLASM METASTATIC TO LUNG (H): Status: ACTIVE | Noted: 2024-07-20

## 2024-07-20 PROBLEM — E03.9 HYPOTHYROIDISM: Status: ACTIVE | Noted: 2020-09-03

## 2024-07-20 PROBLEM — N28.89 RENAL MASS: Status: ACTIVE | Noted: 2020-02-18

## 2024-07-20 PROBLEM — R94.5 ABNORMAL RESULTS OF LIVER FUNCTION STUDIES: Status: ACTIVE | Noted: 2024-07-20

## 2024-07-20 PROBLEM — R91.8 MULTIPLE PULMONARY NODULES: Status: ACTIVE | Noted: 2020-02-18

## 2024-07-20 PROBLEM — F41.9 ANXIETY: Status: ACTIVE | Noted: 2024-07-20

## 2024-07-20 PROBLEM — C64.2 RENAL CELL CARCINOMA OF LEFT KIDNEY (H): Status: ACTIVE | Noted: 2020-02-05

## 2024-07-20 PROCEDURE — 99441 PR PHYSICIAN TELEPHONE EVALUATION 5-10 MIN: CPT | Mod: 93

## 2024-07-20 PROCEDURE — 99207 PR NON-BILLABLE SERV PER CHARTING: CPT | Performed by: PHYSICIAN ASSISTANT

## 2024-07-20 NOTE — PATIENT INSTRUCTIONS
Hello,   Unfortunately we are not able to treat you with antivirals for COVID through an E-visit. You will need to schedule a virtual phone or video visit with a provider to review Paxlovid or other antiviral therapy. You can get scheduled for a this visit via [employer] or our scheduling line [phone] ([phone]).    Thank you,   Blaine Stone PA-C

## 2024-07-20 NOTE — PROGRESS NOTES
"Assessment:    SARS-CoV-2 positive    - nirmatrelvir and ritonavir (PAXLOVID) 300 mg/100 mg therapy pack  Dispense: 30 tablet; Refill: 0       COVID-19 positive patient.  Encounter for consideration of medication intervention.    Patient does qualify for a prescription.   Full discussion with patient including medication options, risks and benefits.   Potential drug interactions reviewed with patient.      Plan:  Treatment planned   Paxlovid will be sent in to preferred pharmacy.     Temporary change to home medications:   Vivek Menon  is a 53 year old  who has a confirmed new positive COVID-19 diagnosis.      He has been identified as high risk for complications of this infection, and is being evaluated via a billable     Phone visit.      Phone call duration: 9 minutes  Patient location: Home  Provider location: Home    Concern for COVID-19  When did symptoms begin? 7/20/24    Positive COVID test? Yes    Symptoms (Cough, trouble breathing, fever, chills, headache, sore throat, chest pain, diarrhea, body aches)?   Sneezing, fatigue, scratchy throat, cough           Constitutional, HEENT, cardiovascular, pulmonary, gi and gu systems are negative, except as otherwise noted.    Objective    Vitals:  No vitals were obtained today due to virtual visit.  Estimated body mass index is 33.12 kg/m  as calculated from the following:    Height as of 11/9/23: 1.905 m (6' 3\").    Weight as of 11/9/23: 120.2 kg (265 lb).   General: Alert, no apparent distress  PSYCH: Alert; coherent speech, normal rate and volume, able to articulate logical thoughts, appropriate insight, no tangential thoughts, no hallucination or delusions.  Affect is appropriate  RESP: No cough, no audible wheezing, able to talk in full sentences  Remainder of exam unable to be completed due to telephone visit  GFR Estimate   Date Value Ref Range Status   07/24/2020 >90 >60 mL/min/[1.73_m2] Final     Comment:     Non  " "GFR Calc  Starting 12/18/2018, serum creatinine based estimated GFR (eGFR) will be   calculated using the Chronic Kidney Disease Epidemiology Collaboration   (CKD-EPI) equation.              The patient has been notified of following:     \"This telephone visit will be conducted via a call between you and your physician/provider. We have found that certain health care needs can be provided without the need for a physical exam.  This service lets us provide the care you need with a short phone conversation.  If a prescription is necessary we can send it directly to your pharmacy.  If lab work is needed we can place an order for that and you can then stop by our lab to have the test done at a later time.    Telephone visits are billed at different rates depending on your insurance coverage. During this emergency period, for some insurers they may be billed the same as an in-person visit.  Please reach out to your insurance provider with any questions.    If during the course of the call the physician/provider feels a telephone visit is not appropriate, you will not be charged for this service.\"    Patient has given verbal consent for Telephone visit?  Yes  "

## 2024-08-02 ENCOUNTER — TRANSFERRED RECORDS (OUTPATIENT)
Dept: HEALTH INFORMATION MANAGEMENT | Facility: CLINIC | Age: 54
End: 2024-08-02
Payer: COMMERCIAL

## 2024-09-09 DIAGNOSIS — T78.2XXS ANAPHYLAXIS, SEQUELA: ICD-10-CM

## 2024-09-09 RX ORDER — EPINEPHRINE 0.3 MG/.3ML
0.3 INJECTION SUBCUTANEOUS
Qty: 1 EACH | Refills: 0 | Status: SHIPPED | OUTPATIENT
Start: 2024-09-09

## 2024-09-12 DIAGNOSIS — I10 ESSENTIAL HYPERTENSION: ICD-10-CM

## 2024-09-13 RX ORDER — AMLODIPINE BESYLATE 10 MG/1
10 TABLET ORAL DAILY
Qty: 90 TABLET | Refills: 3 | Status: SHIPPED | OUTPATIENT
Start: 2024-09-13

## 2024-09-21 DIAGNOSIS — E78.2 MIXED HYPERLIPIDEMIA: ICD-10-CM

## 2024-09-22 ENCOUNTER — MYC MEDICAL ADVICE (OUTPATIENT)
Dept: FAMILY MEDICINE | Facility: CLINIC | Age: 54
End: 2024-09-22
Payer: COMMERCIAL

## 2024-09-24 RX ORDER — ROSUVASTATIN CALCIUM 20 MG/1
TABLET, COATED ORAL
Qty: 90 TABLET | Refills: 3 | Status: SHIPPED | OUTPATIENT
Start: 2024-09-24

## 2024-09-25 ENCOUNTER — OFFICE VISIT (OUTPATIENT)
Dept: FAMILY MEDICINE | Facility: CLINIC | Age: 54
End: 2024-09-25
Payer: COMMERCIAL

## 2024-09-25 VITALS
DIASTOLIC BLOOD PRESSURE: 87 MMHG | RESPIRATION RATE: 16 BRPM | BODY MASS INDEX: 34.37 KG/M2 | WEIGHT: 275 LBS | SYSTOLIC BLOOD PRESSURE: 119 MMHG | HEART RATE: 78 BPM | TEMPERATURE: 97.7 F | OXYGEN SATURATION: 96 %

## 2024-09-25 DIAGNOSIS — G47.00 INSOMNIA, UNSPECIFIED TYPE: ICD-10-CM

## 2024-09-25 DIAGNOSIS — R73.03 PREDIABETES: ICD-10-CM

## 2024-09-25 DIAGNOSIS — I10 ESSENTIAL HYPERTENSION: Primary | ICD-10-CM

## 2024-09-25 DIAGNOSIS — M51.26 LUMBAR HERNIATED DISC: ICD-10-CM

## 2024-09-25 DIAGNOSIS — E03.9 ACQUIRED HYPOTHYROIDISM: ICD-10-CM

## 2024-09-25 DIAGNOSIS — E78.2 MIXED HYPERLIPIDEMIA: ICD-10-CM

## 2024-09-25 PROCEDURE — 90471 IMMUNIZATION ADMIN: CPT | Performed by: FAMILY MEDICINE

## 2024-09-25 PROCEDURE — 90673 RIV3 VACCINE NO PRESERV IM: CPT | Performed by: FAMILY MEDICINE

## 2024-09-25 PROCEDURE — 99214 OFFICE O/P EST MOD 30 MIN: CPT | Mod: 25 | Performed by: FAMILY MEDICINE

## 2024-09-25 RX ORDER — CYCLOBENZAPRINE HCL 10 MG
10 TABLET ORAL 3 TIMES DAILY PRN
Qty: 90 TABLET | Refills: 1 | Status: SHIPPED | OUTPATIENT
Start: 2024-09-25

## 2024-09-25 RX ORDER — ZOLPIDEM TARTRATE 10 MG/1
10 TABLET ORAL
Qty: 30 TABLET | Refills: 1 | Status: SHIPPED | OUTPATIENT
Start: 2024-09-25

## 2024-09-25 ASSESSMENT — ANXIETY QUESTIONNAIRES
GAD7 TOTAL SCORE: 0
7. FEELING AFRAID AS IF SOMETHING AWFUL MIGHT HAPPEN: NOT AT ALL
GAD7 TOTAL SCORE: 0
8. IF YOU CHECKED OFF ANY PROBLEMS, HOW DIFFICULT HAVE THESE MADE IT FOR YOU TO DO YOUR WORK, TAKE CARE OF THINGS AT HOME, OR GET ALONG WITH OTHER PEOPLE?: NOT DIFFICULT AT ALL
GAD7 TOTAL SCORE: 0

## 2024-09-25 ASSESSMENT — PATIENT HEALTH QUESTIONNAIRE - PHQ9
SUM OF ALL RESPONSES TO PHQ QUESTIONS 1-9: 0
10. IF YOU CHECKED OFF ANY PROBLEMS, HOW DIFFICULT HAVE THESE PROBLEMS MADE IT FOR YOU TO DO YOUR WORK, TAKE CARE OF THINGS AT HOME, OR GET ALONG WITH OTHER PEOPLE: NOT DIFFICULT AT ALL
SUM OF ALL RESPONSES TO PHQ QUESTIONS 1-9: 0

## 2024-09-25 NOTE — LETTER
PHYSICIAN ORDERS      DATE & TIME ISSUED: 2024 8:52 AM  PATIENT NAME: Sinan Rodriguez   : 1970     [facility] MR# [if applicable]: [id number]         Labs:    Lipid panel fasting - dx :Hyperlipidemia E78.2  A1c - dx Prediabetes R73.03        Any questions please call:     Please fax these results to [phone]    Jyotsna Mora DO  Family Medicine

## 2024-09-25 NOTE — NURSING NOTE
Prior to immunization administration, verified patients identity using patient s name and date of birth. Please see Immunization Activity for additional information.     Screening Questionnaire for Adult Immunization    Are you sick today?   No   Do you have allergies to medications, food, a vaccine component or latex?   No   Have you ever had a serious reaction after receiving a vaccination?   No   Do you have a long-term health problem with heart, lung, kidney, or metabolic disease (e.g., diabetes), asthma, a blood disorder, no spleen, complement component deficiency, a cochlear implant, or a spinal fluid leak?  Are you on long-term aspirin therapy?   No   Do you have cancer, leukemia, HIV/AIDS, or any other immune system problem?   No   Do you have a parent, brother, or sister with an immune system problem?   No   In the past 3 months, have you taken medications that affect  your immune system, such as prednisone, other steroids, or anticancer drugs; drugs for the treatment of rheumatoid arthritis, Crohn s disease, or psoriasis; or have you had radiation treatments?   No   Have you had a seizure, or a brain or other nervous system problem?   No   During the past year, have you received a transfusion of blood or blood    products, or been given immune (gamma) globulin or antiviral drug?   No   For women: Are you pregnant or is there a chance you could become       pregnant during the next month?   No   Have you received any vaccinations in the past 4 weeks?   No     Immunization questionnaire answers were all negative.      Patient instructed to remain in clinic for 15 minutes afterwards, and to report any adverse reactions.     Screening performed by Tj Romero MA on 9/25/2024 at 8:45 AM.

## 2024-09-25 NOTE — PROGRESS NOTES
"  Assessment & Plan     Essential hypertension  BP controlled     Acquired hypothyroidism  TSH followed by endocrine -   Most recent TSH was 2.07 and levothyroxine dose stable     Mixed hyperlipidemia  Has been almost one year since last lipid -   Has blood draw at oncology in 2 days so will check then   On rosuvastatin   Refill as needed   - Lipid panel reflex to direct LDL Fasting; Future    Insomnia, unspecified type  Uses prn and with travel  Refilled   - zolpidem (AMBIEN) 10 MG tablet; Take 1 tablet (10 mg) by mouth nightly as needed for sleep.    Lumbar herniated disc  2-3 weeks of left radicular symptoms  Pt has TENS and has been using   No previous surgery or injections  He has hx of renal cell ca and unclear if he could take Medrol dose radames - he will reach out to oncology  Did place referral for spine for evaluation and possibly injection if persisting?  Pt going to [facility] in December   - Spine  Referral; Future  - cyclobenzaprine (FLEXERIL) 10 MG tablet; Take 1 tablet (10 mg) by mouth 3 times daily as needed for muscle spasms.    Prediabetes  Reviewd glucose readings   Will check A1c again   Hold off on metfromin unless A1c is 6.5 or higher or any symptoms arise  - Hemoglobin A1c; Future          BMI  Estimated body mass index is 34.37 kg/m  as calculated from the following:    Height as of 11/9/23: 1.905 m (6' 3\").    Weight as of this encounter: 124.7 kg (275 lb).             Tara Menon is a 53 year old, presenting for the following health issues:  History of Present Illness        9/25/2024     7:52 AM   Additional Questions   Roomed by Guerline ZHANG MA   Accompanied by self         9/25/2024     7:52 AM   Patient Reported Additional Medications   Patient reports taking the following new medications none     History of Present Illness       Reason for visit:  Multiple.  Sent via email.    He eats 2-3 servings of fruits and vegetables daily.He consumes 0 sweetened beverage(s) daily.He " exercises with enough effort to increase his heart rate 30 to 60 minutes per day.  He exercises with enough effort to increase his heart rate 3 or less days per week.   He is taking medications regularly.    Left radicular back pain -   Radiating down the left leg - radiates into the thigh   Started after bending over to pick something up -   L3-L5 herniated discs previously diagnosed  Flare started 2-3 weeks ago   Has had off an back issues for 30+ years  Worse than in the past  No prior surgery    August 7th - TSH 2.07  Lipids - November             Objective    /87   Pulse 78   Temp 97.7  F (36.5  C) (Temporal)   Resp 16   Wt 124.7 kg (275 lb)   SpO2 96%   BMI 34.37 kg/m    Body mass index is 34.37 kg/m .  Physical Exam   GENERAL: alert and no distress  CV: regular rate and rhythm, normal S1 S2, no S3 or S4, no murmur, click or rub, no peripheral edema             Signed Electronically by: Jyotsna Mora DO

## 2024-10-02 ENCOUNTER — TRANSFERRED RECORDS (OUTPATIENT)
Dept: HEALTH INFORMATION MANAGEMENT | Facility: CLINIC | Age: 54
End: 2024-10-02
Payer: COMMERCIAL

## 2024-10-08 NOTE — TELEPHONE ENCOUNTER
Called and spoke with patient regarding upcoming appointment. Per patient he is looking for best treatment plan options such as conservative options. Patient did verify that he has not tried any physical therapy or injections. He is looking for a second opinion on conservative options versus what [facility] recommended.    SPINE PATIENTS - NEW PROTOCOL PREVISIT    RECORDS RECEIVED FROM: Referred by Jyotsna Mora DO   REASON FOR VISIT: Lumbar herniated disc   PROVIDER: Linda   DATE OF APPT: 10/09/2024   NOTES (FOR ALL VISITS) STATUS DETAILS   OFFICE NOTE from referring provider Internal Referral and notes in chart   NO WORK-UP  No imaging/no prev work-up Received MRI Lumbar 10/02/2024 w/Maykel

## 2024-10-09 ENCOUNTER — PRE VISIT (OUTPATIENT)
Dept: NEUROSURGERY | Facility: CLINIC | Age: 54
End: 2024-10-09

## 2024-10-09 ENCOUNTER — OFFICE VISIT (OUTPATIENT)
Dept: NEUROSURGERY | Facility: CLINIC | Age: 54
End: 2024-10-09
Attending: FAMILY MEDICINE
Payer: COMMERCIAL

## 2024-10-09 VITALS — OXYGEN SATURATION: 97 % | SYSTOLIC BLOOD PRESSURE: 121 MMHG | HEART RATE: 90 BPM | DIASTOLIC BLOOD PRESSURE: 90 MMHG

## 2024-10-09 DIAGNOSIS — M51.26 LUMBAR HERNIATED DISC: ICD-10-CM

## 2024-10-09 DIAGNOSIS — M54.42 ACUTE LEFT-SIDED LOW BACK PAIN WITH LEFT-SIDED SCIATICA: Primary | ICD-10-CM

## 2024-10-09 PROCEDURE — 99203 OFFICE O/P NEW LOW 30 MIN: CPT | Performed by: PHYSICIAN ASSISTANT

## 2024-10-09 PROCEDURE — 99213 OFFICE O/P EST LOW 20 MIN: CPT | Performed by: PHYSICIAN ASSISTANT

## 2024-10-09 RX ORDER — METHYLPREDNISOLONE 4 MG/1
TABLET ORAL
Qty: 21 TABLET | Refills: 1 | Status: SHIPPED | OUTPATIENT
Start: 2024-10-09

## 2024-10-09 ASSESSMENT — PAIN SCALES - GENERAL: PAINLEVEL: MODERATE PAIN (5)

## 2024-10-09 NOTE — LETTER
10/9/2024      Sinan Rodriguez  [facility] [address]  [place] [address]      Dear Colleague,    Thank you for referring your patient, Sinan Rodriguez, to the [facility] [place]. Please see a copy of my visit note below.    Neurosurgery Consult    HPI    Mr. Rodriguez is a 53-year-old male who presents to clinic for evaluation of left-sided low back pain and leg pain in L5 distribution ending in about his knee.  Is been going on for about 3 weeks.  It began when he was bending.  He was seen at [facility] earlier this week and had an injection planned with them.  He presents here for second opinion.    He denies weakness or numbness.  Denies bowel or bladder symptoms or saddle anesthesia.  Denies any right leg symptoms.  He has not yet tried any conservative therapies or had any steroid medication.      Patient also has renal cell cancer, he spoke with his oncologist regarding short-term steroid dosing and states that they said it was acceptable with his immunotherapy.    The patient is going on a trip to [facility] in mid December, and would like to optimally treat his pain prior to that while at the same time avoiding surgery if possible.    Medical history  Obesity  GERD  Hyperlipidemia  Prediabetes  Metastatic renal cell carcinoma    Social history  Works in IT for health insurance company      B/P: 121/90, T: Data Unavailable, P: 90, R: Data Unavailable       Exam    Alert and oriented no acute distress  Bilateral lower extremities with 5/5 strength  Reflexes 2+ patella/absent ankle  Negative straight leg raise bilaterally  Negative ankle clonus negative Babinski bilaterally  Lumbar spine nontender to palpation  Able to stand on heels and toes  Gait is normal    Imaging    Lumbar MRI demonstrates a large caudally extruded disc herniation at L4-5 impinging on the left L V nerve, as well as foraminal stenosis in the left L5-S1 impinging on the L5  nerve.    Assessment    Left L5 radiculopathy due to disc herniation and possibly foraminal stenosis as well    Plan:      I recommend the patient begin with a trial of physical therapy, if that is not helping, he can attempt an injection on the left at L4-5, 1 week prior to his trip.  He will also begin with a Medrol Dosepak at this time to see if that provides him with any relief, and I provided him with a single refill that he can bring on his trip with him.    We discussed red flag symptoms which would be weakness, constant numbness, bowel or bladder symptoms or saddle anesthesia, this should prompt him to return to our clinic sooner.    He will follow-up with us as needed if he is not improving.      Again, thank you for allowing me to participate in the care of your patient.        Sincerely,        Geoff Mckeon PA-C

## 2024-10-09 NOTE — NURSING NOTE
"Sinan Rodriguez is a 53 year old male who presents for:  Chief Complaint   Patient presents with    Consult        Vitals:    Vitals:    10/09/24 1306   BP: (!) 121/90   Pulse: 90   SpO2: 97%       BMI:  Estimated body mass index is 34.37 kg/m  as calculated from the following:    Height as of 11/9/23: 6' 3\" (1.905 m).    Weight as of 9/25/24: 275 lb (124.7 kg).    Pain Score:  Moderate Pain (5)        Amendo Phorn      "

## 2024-10-09 NOTE — PROGRESS NOTES
Neurosurgery Consult    HPI    Mr. Rodriguez is a 53-year-old male who presents to clinic for evaluation of left-sided low back pain and leg pain in L5 distribution ending in about his knee.  Is been going on for about 3 weeks.  It began when he was bending.  He was seen at [facility] earlier this week and had an injection planned with them.  He presents here for second opinion.    He denies weakness or numbness.  Denies bowel or bladder symptoms or saddle anesthesia.  Denies any right leg symptoms.  He has not yet tried any conservative therapies or had any steroid medication.      Patient also has renal cell cancer, he spoke with his oncologist regarding short-term steroid dosing and states that they said it was acceptable with his immunotherapy.    The patient is going on a trip to [facility] in mid December, and would like to optimally treat his pain prior to that while at the same time avoiding surgery if possible.    Medical history  Obesity  GERD  Hyperlipidemia  Prediabetes  Metastatic renal cell carcinoma    Social history  Works in IT for health insurance company      B/P: 121/90, T: Data Unavailable, P: 90, R: Data Unavailable       Exam    Alert and oriented no acute distress  Bilateral lower extremities with 5/5 strength  Reflexes 2+ patella/absent ankle  Negative straight leg raise bilaterally  Negative ankle clonus negative Babinski bilaterally  Lumbar spine nontender to palpation  Able to stand on heels and toes  Gait is normal    Imaging    Lumbar MRI demonstrates a large caudally extruded disc herniation at L4-5 impinging on the left L V nerve, as well as foraminal stenosis in the left L5-S1 impinging on the L5 nerve.    Assessment    Left L5 radiculopathy due to disc herniation and possibly foraminal stenosis as well    Plan:      I recommend the patient begin with a trial of physical therapy, if that is not helping, he can attempt an injection on the left at L4-5, 1 week prior to his trip.   He will also begin with a Medrol Dosepak at this time to see if that provides him with any relief, and I provided him with a single refill that he can bring on his trip with him.    We discussed red flag symptoms which would be weakness, constant numbness, bowel or bladder symptoms or saddle anesthesia, this should prompt him to return to our clinic sooner.    He will follow-up with us as needed if he is not improving.

## 2024-10-12 DIAGNOSIS — K21.9 GASTROESOPHAGEAL REFLUX DISEASE WITHOUT ESOPHAGITIS: ICD-10-CM

## 2024-10-15 ENCOUNTER — THERAPY VISIT (OUTPATIENT)
Dept: PHYSICAL THERAPY | Facility: CLINIC | Age: 54
End: 2024-10-15
Attending: PHYSICIAN ASSISTANT
Payer: COMMERCIAL

## 2024-10-15 DIAGNOSIS — M51.26 LUMBAR HERNIATED DISC: ICD-10-CM

## 2024-10-15 PROCEDURE — 97161 PT EVAL LOW COMPLEX 20 MIN: CPT | Mod: GP

## 2024-10-15 PROCEDURE — 97110 THERAPEUTIC EXERCISES: CPT | Mod: GP

## 2024-10-15 NOTE — PROGRESS NOTES
PHYSICAL THERAPY EVALUATION  Type of Visit: Evaluation       Fall Risk Screen:  Fall screen completed by: PT  Have you fallen 2 or more times in the past year?: No  Have you fallen and had an injury in the past year?: No  Is patient a fall risk?: No    Subjective       Presenting condition or subjective complaint: L4 nerve impingement.  Date of onset: 09/15/24    Relevant medical history:     Dates & types of surgery:      Prior diagnostic imaging/testing results: MRI     Prior therapy history for the same diagnosis, illness or injury: No      Prior Level of Function  Transfers:   Ambulation:   ADL:   IADL:     Living Environment  Social support: With a significant other or spouse   Type of home: [facility]   Stairs to enter the home: No       Ramp: No   Stairs inside the home: Yes 15 Is there a railing: Yes     Help at home: None  Equipment owned: Straight Cane; Walker; Crutches     Employment: Yes    Hobbies/Interests:      Patient goals for therapy: On 12/14 i am going to [facility] and need to be mobile by then.    Physical Therapist Assessment    KEY PT FINDINGS:  1) Negative neuro exam  2) Negative neural tension  3) No clear directional preference - weak posterior chain    Signs and symptoms are consistent with lumbar radiculopathy.      Subjective History    Patient was referred by Geoff Mckeon for Lumbar herniated disc [M51.26]   Referring provider plan: Mr. Rodriguez is a 53-year-old male who presents to clinic for evaluation of left-sided low back pain and leg pain in L5 distribution ending in about his knee.  Is been going on for about 3 weeks.  It began when he was bending.  He was seen at [facility] earlier this week and had an injection planned with them.  He presents here for second opinion.     He denies weakness or numbness.  Denies bowel or bladder symptoms or saddle anesthesia.  Denies any right leg symptoms.  He has not yet tried any conservative therapies or had any steroid  medication.       Patient also has renal cell cancer, he spoke with his oncologist regarding short-term steroid dosing and states that they said it was acceptable with his immunotherapy.     The patient is going on a trip to [facility] in mid December, and would like to optimally treat his pain prior to that while at the same time avoiding surgery if possible.    Plan:     I recommend the patient begin with a trial of physical therapy, if that is not helping, he can attempt an injection on the left at L4-5, 1 week prior to his trip.  He will also begin with a Medrol Dosepak at this time to see if that provides him with any relief, and I provided him with a single refill that he can bring on his trip with him.     We discussed red flag symptoms which would be weakness, constant numbness, bowel or bladder symptoms or saddle anesthesia, this should prompt him to return to our clinic sooner.     He will follow-up with us as needed if he is not improving.       PT Subjective:   Patient is a 53 year old male presenting to outpatient physical therapy with low back pain with left sided sciatica. His original pain started about 4 weeks ago after bending forward. Was prescribed a medrol dosepak and it has helped his pain quite a bit. He does note some balance issues, but no significant changes in strength, sensation, or bowel bladder. Can sit or stand for long periods, it's the transition between that is a bit more painful. Walking is okay. The pain does radiate down the left leg to about the knee.  Pain Level at Rest: 0/10  Pain Level with Use: 7/10  Pain Location: lumbar spine and knee  Pain Quality: constant and throbbing  Pain Frequency: constant  Pain is Worst: dependent on activity  Pain is Exacerbated By: sit to stands, getting in and out of bed, getting in and out of a car  Pain is Relieved By: NSAIDs, otc medications, and rest  Pain Progression: Improved     PMH:   Patient Active Problem List   Diagnosis     Chondromalacia of patella    Knee pain    Low back pain    Essential hypertension    Clot    Mixed hyperlipidemia    Gastroesophageal reflux disease without esophagitis    Other insomnia    Lumbar herniated disc    Ineffective esophageal motility    Obesity, unspecified    Prediabetes    Cholelithiasis    Varicose veins of other specified sites    Metastatic renal cell carcinoma to bone (H)    Hypothyroidism due to medication    Elevated glucose    Abnormal results of liver function studies    Anxiety    Atypical nevi    Embolism and thrombosis of superficial veins of right lower extremity    Hypothyroidism    Malignant neoplasm metastatic to lung (H)    Multiple pulmonary nodules    Renal cell carcinoma of left kidney (H)    Renal mass    Sexual dysfunction     Medications:   Current Outpatient Medications   Medication Sig Dispense Refill    amLODIPine (NORVASC) 10 MG tablet TAKE 1 TABLET DAILY 90 tablet 3    ammonium lactate (AMLACTIN) 12 % external cream Apply topically 2 times daily 385 g 3    aspirin (ASPIRIN ADULT) 325 MG tablet daily       blood glucose (NO BRAND SPECIFIED) lancets standard Use to test blood sugar one times daily or as directed. 100 each 3    blood glucose (NO BRAND SPECIFIED) test strip Use to test blood sugar once times daily or as directed. 100 strip 3    Blood Glucose Monitoring Suppl (ONETOUCH VERIO REFLECT) w/Device KIT 1 Device daily 1 kit 0    Cabozantinib S-Malate (CABOMETYX) 60 MG Take 60 mg by mouth daily Every 3 days      cyclobenzaprine (FLEXERIL) 10 MG tablet Take 1 tablet (10 mg) by mouth 3 times daily as needed for muscle spasms. 90 tablet 1    cyclobenzaprine (FLEXERIL) 10 MG tablet TAKE 1 TABLET THREE TIMES A DAY AS NEEDED FOR MUSCLE SPASMS 90 tablet 0    EPINEPHrine (EPIPEN 2-CHERELLE) 0.3 MG/0.3ML injection 2-pack Inject 0.3 mLs (0.3 mg) into the muscle once as needed for anaphylaxis. 1 each 0    guaiFENesin-codeine (ROBITUSSIN AC) 100-10 MG/5ML solution Take 5-10 mLs by mouth  every 4 hours as needed for cough 237 mL 0    levothyroxine (SYNTHROID/LEVOTHROID) 50 MCG tablet Take 1 tablet (50 mcg) by mouth daily      losartan (COZAAR) 25 MG tablet Take 3 tablets (75 mg) by mouth daily 270 tablet 3    methylPREDNISolone (MEDROL DOSEPAK) 4 MG tablet therapy pack Follow Package Directions 21 tablet 1    Nivolumab (OPDIVO IV)       omeprazole (PRILOSEC) 20 MG DR capsule TAKE 1 CAPSULE DAILY 90 capsule 3    rosuvastatin (CRESTOR) 20 MG tablet TAKE 1 TABLET DAILY 90 tablet 3    zoledronic acid (ZOMETA) 4 MG/100ML SOLN Inject 100 mLs (4 mg) into the vein once for 1 dose 100 mL 0    zolpidem (AMBIEN) 10 MG tablet Take 1 tablet (10 mg) by mouth nightly as needed for sleep. 30 tablet 1     No current facility-administered medications for this visit.           Imaging: Lumbar MRI demonstrates a large caudally extruded disc herniation at L4-5 impinging on the left L V nerve, as well as foraminal stenosis in the left L5-S1 impinging on the L5 nerve.   Red Flag Screening: negative  Prior Treatment Includes: None  Lifestyle History:  Occupation: Desk Job  Experience with physical activity: Traveling  General Health Assessment: NT.  Referral recommended? No  Additional Considerations: Leaves to [facility] in late December     Patient Goals for Physical Therapy: Get ready for the trip coming up in December and improve mobility.         Objective   Objective Examination    Static Posture/Observations:   Standing Posture: Lordosis increased  Sitting Posture: Rounded shoulders, Forward head  Correction of posture: No effect  Lateral Shift: Left. Relevant: Yes    LUMBAR AROM (in standing): (Major, Moderate, Minimal or Nil loss)  Movement Loss Mihir Mod Min Nil Pain Comments   Flexion   X      Extension   X      Side Glide L         Side Glide R         Trunk rotation L         Trunk rotation R           Repeated Movements:   Flexion in standing x 10 reps:  During testing: No Effect   After testing: No  Effect  Extension in standing x 10 reps:  During testing: No Effect   After testing: No Effect  Directional Preference: Unclear    Dynamic Movement Screen  Single leg stance:   Right: Diffculty with balance eyes closed   Left: Difficulty with balance eyes open   Double leg squat: Increased trunk lean and Improper use of glutes/hips  Single leg squat:   Right: Not assessed  Left: Not assessed    Gait: Lateral trunk lean mild to the L    Myotomes: WNL    DTRs:    Left Right   C5 (Biceps)     C6 (Brachioradialis)     C7 (Triceps)     L4 (Quad) 1 1   S1 (Achilles) 1 1       Dermatomes: WNL    Cord Signs: WNL    Neural Tension:   L R   Slump - -   SLR - -   Femoral - -     Hip and Knee Screen   MMT Hip Abduction Hip Extension Hip IR Hip ER   Left 5/5 4-/5 4-/5 4-/5   Right 5/5 4-/5 4-/5 4-/5     MMT Knee Ext Knee Flexion   Left 5/5 5/5   Right 5/5 5/5     Hip PROM: (* indicates patient's primary complaint)   R L   FLX Mod limit Min limit   EXT     ER Mod limit Min limit   IR Mod limit Mod limit   ABD       Flexibility:  tight hamstrings and gluts bilateral    SIJ Screen:  SIJ Tests Positive (+)/Negative (-)/painful but not provocative of patient complaint (+/-)   Thigh thrust    Distraction    Compression    Sacral Thrust -   Gaenslen's    Active SLR    Other      Thoracic Screen: Negative PA testing    Palpation: WNL    Spine Segmental Joint Mobility: WNL.    Special Tests:  WNL  Prone instability test: NA      Assessment & Plan   CLINICAL IMPRESSIONS  Medical Diagnosis: Lumbar herniated disc (M51.26)    Treatment Diagnosis: Lumbar radiculopathy   Impression/Assessment: Patient is a 53 year old male with low back with sciatica complaints.  The following significant findings have been identified: Pain, Decreased ROM/flexibility, Decreased joint mobility, Decreased strength, Impaired balance, Impaired gait, Impaired muscle performance, Decreased activity tolerance, and Impaired posture. These impairments interfere with  their ability to perform self care tasks, work tasks, recreational activities, household chores, driving , household mobility, and community mobility as compared to previous level of function.     Clinical Decision Making (Complexity):  Clinical Presentation: Stable/Uncomplicated  Clinical Presentation Rationale: based on medical and personal factors listed in PT evaluation  Clinical Decision Making (Complexity): Low complexity    PLAN OF CARE  Treatment Interventions:  Interventions: Gait Training, Manual Therapy, Neuromuscular Re-education, Therapeutic Activity, Therapeutic Exercise, Self-Care/Home Management    Long Term Goals     PT Goal 1  Goal Identifier: [email or web address]  Goal Description: Patient will be able to verbally confirm their understanding of the complexity of low back pain and the environmental and contextual factors that may influence their pain and function  Rationale: to maximize safety and independence with performance of ADLs and functional tasks  Target Date: 10/29/24  PT Goal 2  Goal Identifier: [email or web address]  Goal Description: Patient will report an improvement on their PAGE by at least 12 to achieve MCID  Rationale: to maximize safety and independence with performance of ADLs and functional tasks;to maximize safety and independence within the home;to maximize safety and independence within the community;to maximize safety and independence with self cares;to maximize safety and independence with transportation  Target Date: 11/26/24  PT Goal 3  Goal Identifier: [email or web address]  Goal Description: Patient will be able to have 0/10 low back pain for three straight days before trip to [facility] in late December  Rationale: to maximize safety and independence with performance of ADLs and functional tasks  Target Date: 11/26/24      Frequency of Treatment: 1x/wk  Duration of Treatment: 6 weeks    Recommended Referrals to Other Professionals:   Education Assessment:   Learner/Method: Patient    Risks and benefits of  evaluation/treatment have been explained.   Patient/Family/caregiver agrees with Plan of Care.     Evaluation Time:     PT Nash, Low Complexity Minutes (00069): 25       Signing Clinician: Tato Hoff PT

## 2024-10-18 ENCOUNTER — TELEPHONE (OUTPATIENT)
Dept: PHYSICAL MEDICINE AND REHAB | Facility: CLINIC | Age: 54
End: 2024-10-18
Payer: COMMERCIAL

## 2024-10-18 DIAGNOSIS — M48.07 NEURAL FORAMINAL STENOSIS OF LUMBOSACRAL SPINE: ICD-10-CM

## 2024-10-18 DIAGNOSIS — M51.26 LUMBAR HERNIATED DISC: ICD-10-CM

## 2024-10-18 DIAGNOSIS — M54.16 LUMBAR RADICULOPATHY: Primary | ICD-10-CM

## 2024-10-18 NOTE — PROGRESS NOTES
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

## 2024-10-18 NOTE — TELEPHONE ENCOUNTER
Phoned the patient and left VM. Stated to call the pain clinic to schedule injection procedure at [phone].

## 2024-10-22 ENCOUNTER — TELEPHONE (OUTPATIENT)
Dept: PHYSICAL MEDICINE AND REHAB | Facility: CLINIC | Age: 54
End: 2024-10-22

## 2024-10-22 NOTE — TELEPHONE ENCOUNTER
Second attempt: Phoned the patient to schedule inejction procedure with dr. Gunter. Patient stated he wanted the injection scheduled on 12/9/24. Informed the patient dr. Gunter is out on 12/9/24 & 12/11/24. Informed the patient dr. Nielson [place] [facility] and said he'll go to another location.

## 2024-10-23 ENCOUNTER — THERAPY VISIT (OUTPATIENT)
Dept: PHYSICAL THERAPY | Facility: CLINIC | Age: 54
End: 2024-10-23
Attending: PHYSICIAN ASSISTANT
Payer: COMMERCIAL

## 2024-10-23 DIAGNOSIS — M51.26 LUMBAR HERNIATED DISC: Primary | ICD-10-CM

## 2024-10-23 PROCEDURE — 97110 THERAPEUTIC EXERCISES: CPT | Mod: GP

## 2024-10-30 ENCOUNTER — THERAPY VISIT (OUTPATIENT)
Dept: PHYSICAL THERAPY | Facility: CLINIC | Age: 54
End: 2024-10-30
Attending: PHYSICIAN ASSISTANT
Payer: COMMERCIAL

## 2024-10-30 DIAGNOSIS — M51.26 LUMBAR HERNIATED DISC: Primary | ICD-10-CM

## 2024-10-30 PROCEDURE — 97112 NEUROMUSCULAR REEDUCATION: CPT | Mod: GP

## 2024-10-30 PROCEDURE — 97110 THERAPEUTIC EXERCISES: CPT | Mod: GP

## 2024-11-06 ENCOUNTER — THERAPY VISIT (OUTPATIENT)
Dept: PHYSICAL THERAPY | Facility: CLINIC | Age: 54
End: 2024-11-06
Payer: COMMERCIAL

## 2024-11-06 DIAGNOSIS — M51.26 LUMBAR HERNIATED DISC: Primary | ICD-10-CM

## 2024-11-06 PROCEDURE — 97140 MANUAL THERAPY 1/> REGIONS: CPT | Mod: GP

## 2024-11-06 PROCEDURE — 97110 THERAPEUTIC EXERCISES: CPT | Mod: GP

## 2024-11-08 DIAGNOSIS — R73.09 ELEVATED GLUCOSE: Primary | ICD-10-CM

## 2024-11-08 DIAGNOSIS — R73.03 PREDIABETES: ICD-10-CM

## 2024-11-09 ENCOUNTER — HEALTH MAINTENANCE LETTER (OUTPATIENT)
Age: 54
End: 2024-11-09

## 2024-11-12 RX ORDER — LANCETS
EACH MISCELLANEOUS
Qty: 100 EACH | Refills: 2 | Status: SHIPPED | OUTPATIENT
Start: 2024-11-12

## 2024-11-13 ENCOUNTER — THERAPY VISIT (OUTPATIENT)
Dept: PHYSICAL THERAPY | Facility: CLINIC | Age: 54
End: 2024-11-13
Payer: COMMERCIAL

## 2024-11-13 DIAGNOSIS — M51.26 LUMBAR HERNIATED DISC: Primary | ICD-10-CM

## 2024-11-13 PROCEDURE — 97112 NEUROMUSCULAR REEDUCATION: CPT | Mod: GP

## 2024-11-13 PROCEDURE — 97110 THERAPEUTIC EXERCISES: CPT | Mod: GP

## 2024-11-20 ENCOUNTER — THERAPY VISIT (OUTPATIENT)
Dept: PHYSICAL THERAPY | Facility: CLINIC | Age: 54
End: 2024-11-20
Payer: COMMERCIAL

## 2024-11-20 DIAGNOSIS — M51.26 LUMBAR HERNIATED DISC: Primary | ICD-10-CM

## 2024-11-20 PROCEDURE — 97110 THERAPEUTIC EXERCISES: CPT | Mod: GP

## 2024-11-20 PROCEDURE — 97112 NEUROMUSCULAR REEDUCATION: CPT | Mod: GP

## 2024-11-20 PROCEDURE — 97140 MANUAL THERAPY 1/> REGIONS: CPT | Mod: GP

## 2024-11-22 ENCOUNTER — TRANSFERRED RECORDS (OUTPATIENT)
Dept: HEALTH INFORMATION MANAGEMENT | Facility: CLINIC | Age: 54
End: 2024-11-22
Payer: COMMERCIAL

## 2024-12-11 ENCOUNTER — THERAPY VISIT (OUTPATIENT)
Dept: PHYSICAL THERAPY | Facility: CLINIC | Age: 54
End: 2024-12-11
Payer: COMMERCIAL

## 2024-12-11 DIAGNOSIS — M51.26 LUMBAR HERNIATED DISC: Primary | ICD-10-CM

## 2024-12-11 PROCEDURE — 97530 THERAPEUTIC ACTIVITIES: CPT | Mod: GP

## 2024-12-11 PROCEDURE — 97110 THERAPEUTIC EXERCISES: CPT | Mod: GP

## 2025-01-10 ENCOUNTER — TRANSFERRED RECORDS (OUTPATIENT)
Dept: HEALTH INFORMATION MANAGEMENT | Facility: CLINIC | Age: 55
End: 2025-01-10
Payer: COMMERCIAL

## 2025-01-15 ENCOUNTER — THERAPY VISIT (OUTPATIENT)
Dept: PHYSICAL THERAPY | Facility: CLINIC | Age: 55
End: 2025-01-15
Payer: COMMERCIAL

## 2025-01-15 DIAGNOSIS — M51.26 LUMBAR HERNIATED DISC: Primary | ICD-10-CM

## 2025-01-15 PROCEDURE — 97110 THERAPEUTIC EXERCISES: CPT | Mod: GP

## 2025-01-23 DIAGNOSIS — R73.03 PREDIABETES: ICD-10-CM

## 2025-01-23 RX ORDER — BLOOD SUGAR DIAGNOSTIC
STRIP MISCELLANEOUS
Qty: 100 STRIP | Refills: 3 | Status: SHIPPED | OUTPATIENT
Start: 2025-01-23

## 2025-02-01 ENCOUNTER — APPOINTMENT (OUTPATIENT)
Dept: CT IMAGING | Facility: CLINIC | Age: 55
End: 2025-02-01
Attending: EMERGENCY MEDICINE
Payer: COMMERCIAL

## 2025-02-01 ENCOUNTER — HOSPITAL ENCOUNTER (EMERGENCY)
Facility: CLINIC | Age: 55
Discharge: HOME | End: 2025-02-02
Attending: EMERGENCY MEDICINE | Admitting: EMERGENCY MEDICINE
Payer: COMMERCIAL

## 2025-02-01 VITALS
TEMPERATURE: 97.2 F | RESPIRATION RATE: 20 BRPM | DIASTOLIC BLOOD PRESSURE: 102 MMHG | HEART RATE: 75 BPM | OXYGEN SATURATION: 98 % | SYSTOLIC BLOOD PRESSURE: 148 MMHG

## 2025-02-01 DIAGNOSIS — R04.2 HEMOPTYSIS: ICD-10-CM

## 2025-02-01 DIAGNOSIS — J20.9 ACUTE BRONCHITIS, UNSPECIFIED ORGANISM: ICD-10-CM

## 2025-02-01 LAB
ANION GAP SERPL CALCULATED.3IONS-SCNC: 13 MMOL/L (ref 7–15)
BASOPHILS # BLD AUTO: 0 10E3/UL (ref 0–0.2)
BASOPHILS NFR BLD AUTO: 1 %
BUN SERPL-MCNC: 13.6 MG/DL (ref 6–20)
CALCIUM SERPL-MCNC: 9.3 MG/DL (ref 8.8–10.4)
CHLORIDE SERPL-SCNC: 99 MMOL/L (ref 98–107)
CREAT BLD-MCNC: 1 MG/DL (ref 0.7–1.3)
CREAT SERPL-MCNC: 0.84 MG/DL (ref 0.67–1.17)
EGFRCR SERPLBLD CKD-EPI 2021: >60 ML/MIN/1.73M2
EGFRCR SERPLBLD CKD-EPI 2021: >90 ML/MIN/1.73M2
EOSINOPHIL # BLD AUTO: 0.3 10E3/UL (ref 0–0.7)
EOSINOPHIL NFR BLD AUTO: 5 %
ERYTHROCYTE [DISTWIDTH] IN BLOOD BY AUTOMATED COUNT: 15.9 % (ref 10–15)
FLUAV RNA SPEC QL NAA+PROBE: NEGATIVE
FLUBV RNA RESP QL NAA+PROBE: NEGATIVE
GLUCOSE SERPL-MCNC: 120 MG/DL (ref 70–99)
HCO3 SERPL-SCNC: 23 MMOL/L (ref 22–29)
HCT VFR BLD AUTO: 46.9 % (ref 40–53)
HGB BLD-MCNC: 15.6 G/DL (ref 13.3–17.7)
HOLD SPECIMEN: NORMAL
HOLD SPECIMEN: NORMAL
IMM GRANULOCYTES # BLD: 0 10E3/UL
IMM GRANULOCYTES NFR BLD: 0 %
LYMPHOCYTES # BLD AUTO: 1.9 10E3/UL (ref 0.8–5.3)
LYMPHOCYTES NFR BLD AUTO: 28 %
MCH RBC QN AUTO: 28 PG (ref 26.5–33)
MCHC RBC AUTO-ENTMCNC: 33.3 G/DL (ref 31.5–36.5)
MCV RBC AUTO: 84 FL (ref 78–100)
MONOCYTES # BLD AUTO: 0.4 10E3/UL (ref 0–1.3)
MONOCYTES NFR BLD AUTO: 5 %
NEUTROPHILS # BLD AUTO: 4.1 10E3/UL (ref 1.6–8.3)
NEUTROPHILS NFR BLD AUTO: 61 %
NRBC # BLD AUTO: 0 10E3/UL
NRBC BLD AUTO-RTO: 0 /100
PLATELET # BLD AUTO: 154 10E3/UL (ref 150–450)
POTASSIUM SERPL-SCNC: 3.9 MMOL/L (ref 3.4–5.3)
RBC # BLD AUTO: 5.58 10E6/UL (ref 4.4–5.9)
RSV RNA SPEC NAA+PROBE: NEGATIVE
SARS-COV-2 RNA RESP QL NAA+PROBE: NEGATIVE
SODIUM SERPL-SCNC: 135 MMOL/L (ref 135–145)
WBC # BLD AUTO: 6.7 10E3/UL (ref 4–11)

## 2025-02-01 PROCEDURE — 80048 BASIC METABOLIC PNL TOTAL CA: CPT | Performed by: EMERGENCY MEDICINE

## 2025-02-01 PROCEDURE — 82310 ASSAY OF CALCIUM: CPT | Performed by: EMERGENCY MEDICINE

## 2025-02-01 PROCEDURE — 36415 COLL VENOUS BLD VENIPUNCTURE: CPT | Performed by: EMERGENCY MEDICINE

## 2025-02-01 PROCEDURE — 250N000011 HC RX IP 250 OP 636: Performed by: EMERGENCY MEDICINE

## 2025-02-01 PROCEDURE — 99285 EMERGENCY DEPT VISIT HI MDM: CPT | Mod: 25

## 2025-02-01 PROCEDURE — 87637 SARSCOV2&INF A&B&RSV AMP PRB: CPT | Performed by: EMERGENCY MEDICINE

## 2025-02-01 PROCEDURE — 82565 ASSAY OF CREATININE: CPT

## 2025-02-01 PROCEDURE — 85025 COMPLETE CBC W/AUTO DIFF WBC: CPT | Performed by: EMERGENCY MEDICINE

## 2025-02-01 PROCEDURE — 71275 CT ANGIOGRAPHY CHEST: CPT

## 2025-02-01 PROCEDURE — 250N000009 HC RX 250: Performed by: EMERGENCY MEDICINE

## 2025-02-01 RX ORDER — CODEINE PHOSPHATE AND GUAIFENESIN 10; 100 MG/5ML; MG/5ML
1-2 SOLUTION ORAL EVERY 4 HOURS PRN
Qty: 118 ML | Refills: 0 | Status: SHIPPED | OUTPATIENT
Start: 2025-02-01

## 2025-02-01 RX ORDER — IOPAMIDOL 755 MG/ML
83 INJECTION, SOLUTION INTRAVASCULAR ONCE
Status: COMPLETED | OUTPATIENT
Start: 2025-02-01 | End: 2025-02-01

## 2025-02-01 RX ORDER — CEFDINIR 300 MG/1
300 CAPSULE ORAL 2 TIMES DAILY
Qty: 14 CAPSULE | Refills: 0 | Status: SHIPPED | OUTPATIENT
Start: 2025-02-01 | End: 2025-02-08

## 2025-02-01 RX ORDER — BENZONATATE 200 MG/1
200 CAPSULE ORAL 3 TIMES DAILY PRN
Qty: 20 CAPSULE | Refills: 0 | Status: SHIPPED | OUTPATIENT
Start: 2025-02-01

## 2025-02-01 RX ORDER — CODEINE PHOSPHATE AND GUAIFENESIN 10; 100 MG/5ML; MG/5ML
1-2 SOLUTION ORAL EVERY 4 HOURS PRN
Qty: 118 ML | Refills: 0 | Status: SHIPPED | OUTPATIENT
Start: 2025-02-01 | End: 2025-02-01

## 2025-02-01 RX ADMIN — SODIUM CHLORIDE 100 ML: 9 INJECTION, SOLUTION INTRAVENOUS at 21:56

## 2025-02-01 RX ADMIN — IOPAMIDOL 83 ML: 755 INJECTION, SOLUTION INTRAVENOUS at 21:56

## 2025-02-01 ASSESSMENT — ACTIVITIES OF DAILY LIVING (ADL)
ADLS_ACUITY_SCORE: 41

## 2025-02-02 NOTE — ED PROVIDER NOTES
Emergency Department Note      History of Present Illness     Chief Complaint   Cough    HPI   Sinan Rodriguez is a 54 year old male with a history of stage 4 renal cell carcinoma, DVT, and pulmonary nodules, under active cancer treatment presenting to the ED for evaluation of a cough. Sinan notes that around 2015 this evening he coughed up blood and shortly after he coughed up some clots. Over the last week and a half he's had a cough, on 01/28 he had a fever of 101, lasting for about 24 hours, additionally, on 01/30, Sinan was diaphoretic throughout the evening. Sinan has been taking cough suppressants which have not been effective in managing his cough. He notes that the cough has caused a sore throat and that he's had some ear congestion as well. No pressure in sinuses, chest pain, or shortness of breath.    Independent Historian   None    Review of External Notes   none    Past Medical History     Medical History and Problem List   Anxiety   Atypical nevi   Chondromalacia of patella   Cholelithiasis   Cataract   Cyst Bautista's Right   DVT   GERD   Ganglion   Gallstones   Hypertension   Hypothyroidism  Hyperlipidemia   Insomnia   Ineffective esophageal motility   Metastatic renal cell carcinoma to bone   Malignant neoplasm metastatic to lung   Multiple pulmonary nodules   Obesity   Prediabetes   Pure hypercholesteremia   Renal mass, left kidney   Thrombosis Superficial Vein Lower Extremity Right   Varicose veins of other specified sites     Medications   Ambien   Aspirin 325 mg   Cozaar   Crestor   Cabometyx   Epipen   Flexeril   Glucophage   Medrol Dosepak   Norvasc   Opdivo   Prilosec   Robitussin   Synthroid   Zometa     Surgical History   Colonoscopy   Knee surgery   Orthopedic surgery   Strip veins bilaterally   CT PRK laser   Cataract extraction   CT guided biopsy of right 6th rib   EGD   Esophageal manometry   Stress echo   Knee arthroscopy     Physical Exam     Patient Vitals for the past 24 hrs:   BP  Temp Temp src Pulse Resp SpO2   02/01/25 2103 (!) 148/102 97.2  F (36.2  C) Temporal 75 20 98 %     Physical Exam  Nursing note and vitals reviewed.  Constitutional:  Appears well-developed and well-nourished. No visible respiratory distress.  HENT:    TMs clear, sinuses non-tender.  Head:    Atraumatic.   Mouth/Throat:   Oropharynx is clear and moist. No oropharyngeal exudate.   Eyes:    Pupils are equal, round, and reactive to light.   Neck:    Normal range of motion. Neck supple.      No tracheal deviation present. No thyromegaly present.   Cardiovascular:  Normal rate, regular rhythm, no murmur   Pulmonary/Chest: Breath sounds are clear and equal without wheezes or crackles.  Abdominal:   Soft. Bowel sounds are normal. Exhibits no distension and      no mass. There is no tenderness.      There is no rebound and no guarding.   Musculoskeletal:  Exhibits no edema.   Lymphadenopathy:  No cervical adenopathy.   Neurological:   Alert and oriented to person, place, and time.   Skin:    Skin is warm and dry. No rash noted. No pallor.     Diagnostics     Lab Results   Labs Ordered and Resulted from Time of ED Arrival to Time of ED Departure   BASIC METABOLIC PANEL - Abnormal       Result Value    Sodium 135      Potassium 3.9      Chloride 99      Carbon Dioxide (CO2) 23      Anion Gap 13      Urea Nitrogen 13.6      Creatinine 0.84      GFR Estimate >90      Calcium 9.3      Glucose 120 (*)    CBC WITH PLATELETS AND DIFFERENTIAL - Abnormal    WBC Count 6.7      RBC Count 5.58      Hemoglobin 15.6      Hematocrit 46.9      MCV 84      MCH 28.0      MCHC 33.3      RDW 15.9 (*)     Platelet Count 154      % Neutrophils 61      % Lymphocytes 28      % Monocytes 5      % Eosinophils 5      % Basophils 1      % Immature Granulocytes 0      NRBCs per 100 WBC 0      Absolute Neutrophils 4.1      Absolute Lymphocytes 1.9      Absolute Monocytes 0.4      Absolute Eosinophils 0.3      Absolute Basophils 0.0      Absolute Immature  Granulocytes 0.0      Absolute NRBCs 0.0     INFLUENZA A/B, RSV AND SARS-COV2 PCR - Normal    Influenza A PCR Negative      Influenza B PCR Negative      RSV PCR Negative      SARS CoV2 PCR Negative     ISTAT CREATININE POCT - Normal    Creatinine POCT 1.0      GFR, ESTIMATED POCT >60       Imaging   CT Chest Pulmonary Embolism w Contrast   Final Result   IMPRESSION:   1.  No evidence pulmonary embolus to the segmental level.        Independent Interpretation   None    ED Course      Medications Administered   Medications   iopamidol (ISOVUE-370) solution 83 mL (83 mLs Intravenous $Given 2/1/25 2156)   Saline (100 mLs Intravenous $Given 2/1/25 2156)     Procedures   Procedures     Discussion of Management   None    ED Course   ED Course as of 02/01/25 2249   Sat Feb 01, 2025 2113 I obtained history and examined the patient as noted above.      Additional Documentation  None    Medical Decision Making / Diagnosis     CMS Diagnoses: None    MIPS    CT for PE was ordered because the patient is high risk for pulmonary embolism.    DOREEN Rodriguez is a 54 year old male with a history of renal cancer with metastasis to the lungs who arrives due to hemoptysis.  Although I was initially concerned about the possibility of pulmonary embolism, pulmonary malignancy, cavitary lesion, pneumonia or vascular abnormality as potential causes of his hemoptysis, CT angiogram was performed since he is high risk and did not show any pulmonary embolism, pneumonia, malignancy or vascular abnormality.  Ultimately I feel this patient has bronchitis and sinusitis so he is prescribed cefdinir for antibiotic coverage as well as Tessalon Perles and Robitussin with codeine at his request.  He was instructed return if symptoms worsen otherwise to follow-up with his primary care doctor Monday or Tuesday.  I felt he could be safely discharged home.  There was no sign of meningitis or hypoxia.    Disposition   The patient was discharged.      Diagnosis     ICD-10-CM    1. Hemoptysis  R04.2       2. Acute bronchitis, unspecified organism  J20.9          Discharge Medications   New Prescriptions    BENZONATATE (TESSALON) 200 MG CAPSULE    Take 1 capsule (200 mg) by mouth 3 times daily as needed for cough. (Swallow whole, do not chew)    CEFDINIR (OMNICEF) 300 MG CAPSULE    Take 1 capsule (300 mg) by mouth 2 times daily for 7 days.    GUAIFENESIN-CODEINE (ROBITUSSIN AC) 100-10 MG/5ML SOLUTION    Take 5-10 mLs by mouth every 4 hours as needed for cough.     Scribe Disclosure:  IMarie, am serving as a scribe at 9:35 PM on 2/1/2025 to document services personally performed by Nunu Navarro MD based on my observations and the provider's statements to me.        Nunu Navarro MD  02/02/25 0055

## 2025-02-02 NOTE — ED TRIAGE NOTES
"Patient presents to the ER for complains of a cough. Patient reports that he has had a cough for 2 weeks now and states he had a \"coughing fit that caused him to cough up blood\". Hx of stage 4 cancer, in active treatment with mets to the lungs but patient states that this feels different than his \"chronic cancer cough and more like a cold\"     Triage Assessment (Adult)       Row Name 02/01/25 8958          Triage Assessment    Airway WDL WDL        Respiratory WDL    Respiratory WDL X        Skin Circulation/Temperature WDL    Skin Circulation/Temperature WDL WDL        Cardiac WDL    Cardiac WDL WDL        Peripheral/Neurovascular WDL    Peripheral Neurovascular WDL WDL        Cognitive/Neuro/Behavioral WDL    Cognitive/Neuro/Behavioral WDL WDL                     "

## 2025-02-07 ENCOUNTER — TRANSFERRED RECORDS (OUTPATIENT)
Dept: HEALTH INFORMATION MANAGEMENT | Facility: CLINIC | Age: 55
End: 2025-02-07
Payer: COMMERCIAL

## 2025-02-11 DIAGNOSIS — I10 ESSENTIAL HYPERTENSION: ICD-10-CM

## 2025-02-12 ENCOUNTER — MYC MEDICAL ADVICE (OUTPATIENT)
Dept: FAMILY MEDICINE | Facility: CLINIC | Age: 55
End: 2025-02-12
Payer: COMMERCIAL

## 2025-02-12 DIAGNOSIS — R73.03 PREDIABETES: ICD-10-CM

## 2025-02-12 RX ORDER — LOSARTAN POTASSIUM 25 MG/1
75 TABLET ORAL DAILY
Qty: 270 TABLET | Refills: 1 | Status: SHIPPED | OUTPATIENT
Start: 2025-02-12

## 2025-02-18 NOTE — TELEPHONE ENCOUNTER
PN,  Please see below MyChart message and advise.  Pharmacy pended     Patient request to verify recommended metformin dose - currently prescribed 500mg daily  2.   Patient requesting guaifenesin-codeine, declined follow up visit and requesting PCP review instead    Thanks,  Irene FELIZ RN

## 2025-02-19 RX ORDER — METFORMIN HYDROCHLORIDE 500 MG/1
1000 TABLET, EXTENDED RELEASE ORAL
Qty: 180 TABLET | Refills: 2 | Status: SHIPPED | OUTPATIENT
Start: 2025-02-19

## 2025-02-19 NOTE — TELEPHONE ENCOUNTER
We can increase the dose of the metformin from 500mg to the 1000mg or two tablets once daily.  I think that would be a good dose for now and no reason to go up.  I will send new prescription to the pharmacy for 2 tablets daily.  [facility]    Regarding the robitussin AC - we need a visit to prescribe controlled substances.   Is he still coughing? If so I probably should see him for a follow-up.  Can double book in the next week at the end of the day if needed.     Thanks  PN

## 2025-03-07 ENCOUNTER — TRANSFERRED RECORDS (OUTPATIENT)
Dept: HEALTH INFORMATION MANAGEMENT | Facility: CLINIC | Age: 55
End: 2025-03-07

## 2025-03-10 SDOH — HEALTH STABILITY: PHYSICAL HEALTH: ON AVERAGE, HOW MANY MINUTES DO YOU ENGAGE IN EXERCISE AT THIS LEVEL?: 60 MIN

## 2025-03-10 SDOH — HEALTH STABILITY: PHYSICAL HEALTH: ON AVERAGE, HOW MANY DAYS PER WEEK DO YOU ENGAGE IN MODERATE TO STRENUOUS EXERCISE (LIKE A BRISK WALK)?: 3 DAYS

## 2025-03-10 ASSESSMENT — SOCIAL DETERMINANTS OF HEALTH (SDOH): HOW OFTEN DO YOU GET TOGETHER WITH FRIENDS OR RELATIVES?: ONCE A WEEK

## 2025-03-11 ENCOUNTER — OFFICE VISIT (OUTPATIENT)
Dept: FAMILY MEDICINE | Facility: CLINIC | Age: 55
End: 2025-03-11
Payer: COMMERCIAL

## 2025-03-11 VITALS
HEART RATE: 60 BPM | BODY MASS INDEX: 32.7 KG/M2 | HEIGHT: 75 IN | OXYGEN SATURATION: 97 % | SYSTOLIC BLOOD PRESSURE: 122 MMHG | RESPIRATION RATE: 18 BRPM | DIASTOLIC BLOOD PRESSURE: 82 MMHG | WEIGHT: 263 LBS | TEMPERATURE: 97.2 F

## 2025-03-11 DIAGNOSIS — E03.2 HYPOTHYROIDISM DUE TO MEDICATION: ICD-10-CM

## 2025-03-11 DIAGNOSIS — Z01.84 IMMUNITY STATUS TESTING: ICD-10-CM

## 2025-03-11 DIAGNOSIS — T78.2XXS ANAPHYLAXIS, SEQUELA: ICD-10-CM

## 2025-03-11 DIAGNOSIS — R73.03 PREDIABETES: ICD-10-CM

## 2025-03-11 DIAGNOSIS — I10 ESSENTIAL HYPERTENSION: ICD-10-CM

## 2025-03-11 DIAGNOSIS — L85.3 DRY SKIN: ICD-10-CM

## 2025-03-11 DIAGNOSIS — C64.2 RENAL CELL CARCINOMA OF LEFT KIDNEY (H): ICD-10-CM

## 2025-03-11 DIAGNOSIS — L85.9 HYPERKERATOSIS: ICD-10-CM

## 2025-03-11 DIAGNOSIS — L98.9 SKIN LESION OF FOOT: ICD-10-CM

## 2025-03-11 DIAGNOSIS — Z00.00 ROUTINE GENERAL MEDICAL EXAMINATION AT A HEALTH CARE FACILITY: Primary | ICD-10-CM

## 2025-03-11 DIAGNOSIS — E03.8 OTHER SPECIFIED HYPOTHYROIDISM: ICD-10-CM

## 2025-03-11 DIAGNOSIS — G47.00 INSOMNIA, UNSPECIFIED TYPE: ICD-10-CM

## 2025-03-11 DIAGNOSIS — Z12.5 SCREENING FOR PROSTATE CANCER: ICD-10-CM

## 2025-03-11 DIAGNOSIS — Z11.3 SCREENING EXAMINATION FOR STI: ICD-10-CM

## 2025-03-11 DIAGNOSIS — E78.2 MIXED HYPERLIPIDEMIA: ICD-10-CM

## 2025-03-11 LAB
C TRACH DNA SPEC QL NAA+PROBE: NEGATIVE
CHOLEST SERPL-MCNC: 121 MG/DL
EST. AVERAGE GLUCOSE BLD GHB EST-MCNC: 134 MG/DL
FASTING STATUS PATIENT QL REPORTED: YES
HBA1C MFR BLD: 6.3 % (ref 0–5.6)
HCV AB SERPL QL IA: NONREACTIVE
HDLC SERPL-MCNC: 33 MG/DL
HIV 1+2 AB+HIV1 P24 AG SERPL QL IA: NONREACTIVE
LDLC SERPL CALC-MCNC: 68 MG/DL
MEV IGG SER IA-ACNC: 31.1 AU/ML
MEV IGG SER IA-ACNC: POSITIVE
N GONORRHOEA DNA SPEC QL NAA+PROBE: NEGATIVE
NONHDLC SERPL-MCNC: 88 MG/DL
PSA SERPL DL<=0.01 NG/ML-MCNC: 3.83 NG/ML (ref 0–3.5)
SPECIMEN TYPE: NORMAL
SPECIMEN TYPE: NORMAL
T PALLIDUM AB SER QL: NONREACTIVE
TRIGL SERPL-MCNC: 101 MG/DL
TSH SERPL DL<=0.005 MIU/L-ACNC: 3.6 UIU/ML (ref 0.3–4.2)

## 2025-03-11 PROCEDURE — 36415 COLL VENOUS BLD VENIPUNCTURE: CPT | Performed by: FAMILY MEDICINE

## 2025-03-11 PROCEDURE — 87389 HIV-1 AG W/HIV-1&-2 AB AG IA: CPT | Performed by: FAMILY MEDICINE

## 2025-03-11 PROCEDURE — 86780 TREPONEMA PALLIDUM: CPT | Performed by: FAMILY MEDICINE

## 2025-03-11 PROCEDURE — 1126F AMNT PAIN NOTED NONE PRSNT: CPT | Performed by: FAMILY MEDICINE

## 2025-03-11 PROCEDURE — 99213 OFFICE O/P EST LOW 20 MIN: CPT | Mod: 25 | Performed by: FAMILY MEDICINE

## 2025-03-11 PROCEDURE — 90471 IMMUNIZATION ADMIN: CPT | Performed by: FAMILY MEDICINE

## 2025-03-11 PROCEDURE — 3079F DIAST BP 80-89 MM HG: CPT | Performed by: FAMILY MEDICINE

## 2025-03-11 PROCEDURE — 87491 CHLMYD TRACH DNA AMP PROBE: CPT | Performed by: FAMILY MEDICINE

## 2025-03-11 PROCEDURE — 80061 LIPID PANEL: CPT | Performed by: FAMILY MEDICINE

## 2025-03-11 PROCEDURE — 83036 HEMOGLOBIN GLYCOSYLATED A1C: CPT | Performed by: FAMILY MEDICINE

## 2025-03-11 PROCEDURE — 99396 PREV VISIT EST AGE 40-64: CPT | Mod: 25 | Performed by: FAMILY MEDICINE

## 2025-03-11 PROCEDURE — 86803 HEPATITIS C AB TEST: CPT | Performed by: FAMILY MEDICINE

## 2025-03-11 PROCEDURE — 90677 PCV20 VACCINE IM: CPT | Performed by: FAMILY MEDICINE

## 2025-03-11 PROCEDURE — 3074F SYST BP LT 130 MM HG: CPT | Performed by: FAMILY MEDICINE

## 2025-03-11 PROCEDURE — 84443 ASSAY THYROID STIM HORMONE: CPT | Performed by: FAMILY MEDICINE

## 2025-03-11 PROCEDURE — G0103 PSA SCREENING: HCPCS | Performed by: FAMILY MEDICINE

## 2025-03-11 PROCEDURE — 86765 RUBEOLA ANTIBODY: CPT | Performed by: FAMILY MEDICINE

## 2025-03-11 PROCEDURE — 87591 N.GONORRHOEAE DNA AMP PROB: CPT | Performed by: FAMILY MEDICINE

## 2025-03-11 RX ORDER — EPINEPHRINE 0.3 MG/.3ML
0.3 INJECTION SUBCUTANEOUS
Qty: 1 EACH | Refills: 3 | Status: SHIPPED | OUTPATIENT
Start: 2025-03-11

## 2025-03-11 RX ORDER — ZOLPIDEM TARTRATE 10 MG/1
10 TABLET ORAL
Qty: 30 TABLET | Refills: 1 | Status: SHIPPED | OUTPATIENT
Start: 2025-03-11

## 2025-03-11 RX ORDER — LEVOTHYROXINE SODIUM 75 UG/1
75 TABLET ORAL
Qty: 90 TABLET | Refills: 0 | Status: SHIPPED | OUTPATIENT
Start: 2025-03-11

## 2025-03-11 RX ORDER — AMMONIUM LACTATE 12 G/100G
CREAM TOPICAL 2 TIMES DAILY
Qty: 385 G | Refills: 3 | Status: SHIPPED | OUTPATIENT
Start: 2025-03-11

## 2025-03-11 ASSESSMENT — PAIN SCALES - GENERAL: PAINLEVEL_OUTOF10: NO PAIN (0)

## 2025-03-11 NOTE — NURSING NOTE
Prior to immunization administration, verified patients identity using patient s name and date of birth. Please see Immunization Activity for additional information.     Screening Questionnaire for Adult Immunization    Are you sick today?   No   Do you have allergies to medications, food, a vaccine component or latex?   No   Have you ever had a serious reaction after receiving a vaccination?   No   Do you have a long-term health problem with heart, lung, kidney, or metabolic disease (e.g., diabetes), asthma, a blood disorder, no spleen, complement component deficiency, a cochlear implant, or a spinal fluid leak?  Are you on long-term aspirin therapy?   No   Do you have cancer, leukemia, HIV/AIDS, or any other immune system problem?   No   Do you have a parent, brother, or sister with an immune system problem?   No   In the past 3 months, have you taken medications that affect  your immune system, such as prednisone, other steroids, or anticancer drugs; drugs for the treatment of rheumatoid arthritis, Crohn s disease, or psoriasis; or have you had radiation treatments?   No   Have you had a seizure, or a brain or other nervous system problem?   No   During the past year, have you received a transfusion of blood or blood    products, or been given immune (gamma) globulin or antiviral drug?   No   For women: Are you pregnant or is there a chance you could become       pregnant during the next month?   No   Have you received any vaccinations in the past 4 weeks?   No     Immunization questionnaire answers were all negative.      Patient instructed to remain in clinic for 15 minutes afterwards, and to report any adverse reactions.     Screening performed by Sol Stein MA on 3/11/2025 at 9:45 AM.

## 2025-03-11 NOTE — PATIENT INSTRUCTIONS
Patient Education   Preventive Care Advice   This is general advice given by our system to help you stay healthy. However, your care team may have specific advice just for you. Please talk to your care team about your preventive care needs.  Nutrition  Eat 5 or more servings of fruits and vegetables each day.  Try wheat bread, brown rice and whole grain pasta (instead of white bread, rice, and pasta).  Get enough calcium and vitamin D. Check the label on foods and aim for 100% of the RDA (recommended daily allowance).  Lifestyle  Exercise at least 150 minutes each week  (30 minutes a day, 5 days a week).  Do muscle strengthening activities 2 days a week. These help control your weight and prevent disease.  No smoking.  Wear sunscreen to prevent skin cancer.  Have a dental exam and cleaning every 6 months.  Yearly exams  See your health care team every year to talk about:  Any changes in your health.  Any medicines your care team has prescribed.  Preventive care, family planning, and ways to prevent chronic diseases.  Shots (vaccines)   HPV shots (up to age 26), if you've never had them before.  Hepatitis B shots (up to age 59), if you've never had them before.  COVID-19 shot: Get this shot when it's due.  Flu shot: Get a flu shot every year.  Tetanus shot: Get a tetanus shot every 10 years.  Pneumococcal, hepatitis A, and RSV shots: Ask your care team if you need these based on your risk.  Shingles shot (for age 50 and up)  General health tests  Diabetes screening:  Starting at age 35, Get screened for diabetes at least every 3 years.  If you are younger than age 35, ask your care team if you should be screened for diabetes.  Cholesterol test: At age 39, start having a cholesterol test every 5 years, or more often if advised.  Bone density scan (DEXA): At age 50, ask your care team if you should have this scan for osteoporosis (brittle bones).  Hepatitis C: Get tested at least once in your life.  STIs (sexually  transmitted infections)  Before age 24: Ask your care team if you should be screened for STIs.  After age 24: Get screened for STIs if you're at risk. You are at risk for STIs (including HIV) if:  You are sexually active with more than one person.  You don't use condoms every time.  You or a partner was diagnosed with a sexually transmitted infection.  If you are at risk for HIV, ask about PrEP medicine to prevent HIV.  Get tested for HIV at least once in your life, whether you are at risk for HIV or not.  Cancer screening tests  Cervical cancer screening: If you have a cervix, begin getting regular cervical cancer screening tests starting at age 21.  Breast cancer scan (mammogram): If you've ever had breasts, begin having regular mammograms starting at age 40. This is a scan to check for breast cancer.  Colon cancer screening: It is important to start screening for colon cancer at age 45.  Have a colonoscopy test every 10 years (or more often if you're at risk) Or, ask your provider about stool tests like a FIT test every year or Cologuard test every 3 years.  To learn more about your testing options, visit:   .  For help making a decision, visit:   https://bit.ly/ra21874.  Prostate cancer screening test: If you have a prostate, ask your care team if a prostate cancer screening test (PSA) at age 55 is right for you.  Lung cancer screening: If you are a current or former smoker ages 50 to 80, ask your care team if ongoing lung cancer screenings are right for you.  For informational purposes only. Not to replace the advice of your health care provider. Copyright   2023 [employer]. All rights reserved. Clinically reviewed by the [facility] Transitions Program. At[employer]on [id number] - REV 01/24.

## 2025-03-11 NOTE — PROGRESS NOTES
Preventive Care Visit  [facility]  Jyotsna Mora DO, Family Medicine  Mar 11, 2025      Assessment & Plan     Routine general medical examination at a health care facility       Other specified hypothyroidism  Waiting for TSH results -   Will refill medication if in range   - TSH WITH FREE T4 REFLEX; Future    Anaphylaxis, sequela  Refilled   - EPINEPHrine (EPIPEN 2-CHERELLE) 0.3 MG/0.3ML injection 2-pack; Inject 0.3 mLs (0.3 mg) into the muscle once as needed for anaphylaxis.    Insomnia, unspecified type  Will refill ambien for prn use     Hyperkeratosis   Refilled   - ammonium lactate (AMLACTIN) 12 % external cream; Apply topically 2 times daily.    Skin lesion of foot     - ammonium lactate (AMLACTIN) 12 % external cream; Apply topically 2 times daily.    Dry skin     - ammonium lactate (AMLACTIN) 12 % external cream; Apply topically 2 times daily.    Prediabetes  Pending A1c  On metformin 2 tablets daily   Will monitor   - Hemoglobin A1c; Future    Hypothyroidism due to medication       Renal cell carcinoma of left kidney (H)  Working with oncology - checking Measles immunity -   May need vaccine but live vaccine so want to check with oncology     Screening for prostate cancer     - PSA, screen; Future    Immunity status testing     - Rubeola Antibody IgG; Future    Essential hypertension  BP controlled   Will refill medication when needed     Mixed hyperlipidemia     - Lipid panel reflex to direct LDL Fasting; Future    Screening examination for STI     - HIV Antigen Antibody Combo; Future  - Hepatitis C Screen Reflex to HCV RNA Quant and Genotype; Future  - Treponema Abs w Reflex to RPR and Titer; Future  - NEISSERIA GONORRHOEA PCR; Future  - CHLAMYDIA TRACHOMATIS PCR; Future    Patient has been advised of split billing requirements and indicates understanding: Yes        BMI  Estimated body mass index is 32.87 kg/m  as calculated from the following:    Height as of this encounter: 1.905 m  "(6' 3\").    Weight as of this encounter: 119.3 kg (263 lb).       Counseling  Appropriate preventive services were addressed with this patient via screening, questionnaire, or discussion as appropriate for fall prevention, nutrition, physical activity, Tobacco-use cessation, social engagement, weight loss and cognition.  Checklist reviewing preventive services available has been given to the patient.  Reviewed patient's diet, addressing concerns and/or questions.   He is at risk for lack of exercise and has been provided with information to increase physical activity for the benefit of his well-being.           Tara Menon is a 54 year old, presenting for the following:  Physical           HPI           Advance Care Planning  Patient does not have a Health Care Directive: Discussed advance care planning with patient; however, patient declined at this time.      3/10/2025   General Health   How would you rate your overall physical health? (!) POOR   Feel stress (tense, anxious, or unable to sleep) Only a little   (!) STRESS CONCERN      3/10/2025   Nutrition   Three or more servings of calcium each day? Yes   Diet: Carbohydrate counting   How many servings of fruit and vegetables per day? (!) 2-3   How many sweetened beverages each day? 0-1         3/10/2025   Exercise   Days per week of moderate/strenous exercise 3 days   Average minutes spent exercising at this level 60 min         3/10/2025   Social Factors   Frequency of gathering with friends or relatives Once a week   Worry food won't last until get money to buy more No   Food not last or not have enough money for food? No   Do you have housing? (Housing is defined as stable permanent housing and does not include staying ouside in a car, in a tent, in an abandoned building, in an overnight shelter, or couch-surfing.) Yes   Are you worried about losing your housing? No   Lack of transportation? No   Unable to get utilities (heat,electricity)? No         " 3/10/2025   Fall Risk   Fallen 2 or more times in the past year? No   Trouble with walking or balance? No          3/10/2025   Dental   Dentist two times every year? Yes             Today's PHQ-2 Score:       2/21/2025     1:39 PM   PHQ-2 ( 1999 [employer])   Q1: Little interest or pleasure in doing things 0   Q2: Feeling down, depressed or hopeless 0   PHQ-2 Score 0    Q1: Little interest or pleasure in doing things Not at all   Q2: Feeling down, depressed or hopeless Not at all   PHQ-2 Score 0       Patient-reported         3/10/2025   Substance Use   Alcohol more than 3/day or more than 7/wk No   Do you use any other substances recreationally? No     Social History     Tobacco Use    Smoking status: Former     Types: Cigars    Smokeless tobacco: Former    Tobacco comments:     Occasional cigar.  Occasional swedish snus.   Substance Use Topics    Alcohol use: Yes     Comment: Mostly wine.  3-4 per week.    Drug use: No           3/10/2025   STI Screening   New sexual partner(s) since last STI/HIV test? No   Last PSA:   PSA   Date Value Ref Range Status   04/28/2021 2.41 0 - 4 ug/L Final     Comment:     Assay Method:  Chemiluminescence using Siemens Vista analyzer     ASCVD Risk   The ASCVD Risk score (Aniket ALVARADO, et al., 2019) failed to calculate for the following reasons:    The valid total cholesterol range is 130 to 320 mg/dL           Reviewed and updated as needed this visit by Provider   Tobacco  Allergies  Meds  Problems  Med Hx  Surg Hx  Fam Hx            Patient Active Problem List   Diagnosis    Chondromalacia of patella    Knee pain    Low back pain    Essential hypertension    Clot    Mixed hyperlipidemia    Gastroesophageal reflux disease without esophagitis    Other insomnia    Lumbar herniated disc    Ineffective esophageal motility    Obesity, unspecified    Prediabetes    Cholelithiasis    Varicose veins of other specified sites    Metastatic renal cell carcinoma to bone (H)     "Hypothyroidism due to medication    Elevated glucose    Abnormal results of liver function studies    Anxiety    Atypical nevi    Embolism and thrombosis of superficial veins of right lower extremity    Hypothyroidism    Malignant neoplasm metastatic to lung (H)    Multiple pulmonary nodules    Renal cell carcinoma of left kidney (H)    Renal mass    Sexual dysfunction     Past Surgical History:   Procedure Laterality Date    COLONOSCOPY N/A 8/23/2019    Procedure: COLONOSCOPY;  Surgeon: Silvano Segovia MD;  Location:  GI    KNEE SURGERY Bilateral     scope multiple - most recent 2014    ORTHOPEDIC SURGERY      STRIP VEIN BILATERAL Bilateral 2015       Social History     Tobacco Use    Smoking status: Former     Types: Cigars    Smokeless tobacco: Former    Tobacco comments:     Occasional cigar.  Occasional swedish snus.   Substance Use Topics    Alcohol use: Yes     Comment: Mostly wine.  3-4 per week.     Family History   Problem Relation Age of Onset    Cholecystitis Father 67        passed away - gangrene    Depression Father     Cancer Brother         arm - sarcoma in HS    Coronary Artery Disease Paternal Grandfather     Hypertension Paternal Grandfather     Hyperlipidemia Paternal Grandfather     Asthma Paternal Grandmother     Osteoporosis Paternal Grandmother              Review of Systems  Constitutional, HEENT, cardiovascular, pulmonary, GI, , musculoskeletal, neuro, skin, endocrine and psych systems are negative, except as otherwise noted.     Objective    Exam  /82 (BP Location: Right arm, Patient Position: Sitting, Cuff Size: Adult Large)   Pulse 60   Temp 97.2  F (36.2  C) (Skin)   Resp 18   Ht 1.905 m (6' 3\")   Wt 119.3 kg (263 lb)   SpO2 97%   BMI 32.87 kg/m     Estimated body mass index is 32.87 kg/m  as calculated from the following:    Height as of this encounter: 1.905 m (6' 3\").    Weight as of this encounter: 119.3 kg (263 lb).    Physical Exam  GENERAL: alert and no " distress  EYES: Eyes grossly normal to inspection, PERRL and conjunctivae and sclerae normal  HENT: ear canals and TM's normal, nose and mouth without ulcers or lesions  NECK: no adenopathy, no asymmetry, masses, or scars  RESP: lungs clear to auscultation - no rales, rhonchi or wheezes  CV: regular rate and rhythm, normal S1 S2, no S3 or S4, no murmur, click or rub, no peripheral edema  ABDOMEN: soft, nontender, no hepatosplenomegaly, no masses and bowel sounds normal  MS: no gross musculoskeletal defects noted, no edema  SKIN: no suspicious lesions or rashes  NEURO: Normal strength and tone, mentation intact and speech normal  PSYCH: mentation appears normal, affect normal/bright        Signed Electronically by: Jyotsna Mora DO

## 2025-03-12 NOTE — RESULT ENCOUNTER NOTE
Dear Sinan,   Your test results are all back -   Per our previous discussion about thyroid - dose increased and plan to recheck in 6 weeks.  The PSA was elevated and I would like to have you see urology - have you seen anyone before?  Good news - you have immunity to measles so no need for the vaccine.  Let us know if you have any questions.  -Jyotsna Mora, DO

## 2025-03-26 ENCOUNTER — OFFICE VISIT (OUTPATIENT)
Dept: PODIATRY | Facility: CLINIC | Age: 55
End: 2025-03-26
Payer: COMMERCIAL

## 2025-03-26 VITALS — BODY MASS INDEX: 32.33 KG/M2 | HEIGHT: 75 IN | WEIGHT: 260 LBS

## 2025-03-26 DIAGNOSIS — L84 CALLUS OF FOOT: Primary | ICD-10-CM

## 2025-03-26 DIAGNOSIS — M21.41 PES PLANUS OF BOTH FEET: ICD-10-CM

## 2025-03-26 DIAGNOSIS — M21.42 PES PLANUS OF BOTH FEET: ICD-10-CM

## 2025-03-26 DIAGNOSIS — M77.42 METATARSALGIA, LEFT FOOT: ICD-10-CM

## 2025-03-26 PROCEDURE — 99213 OFFICE O/P EST LOW 20 MIN: CPT | Performed by: PODIATRIST

## 2025-03-26 NOTE — PATIENT INSTRUCTIONS
Thank you for choosing [facility] Podiatry / Foot & Ankle Surgery!    DR. LOZA'S CLINIC LOCATIONS:     [facility] TRIAGE LINE: [phone]   [address] APPOINTMENTS: [phone]   [place] [address] RADIOLOGY: [phone]   (Every other Tues - Wed - Fri PM) SET UP SURGERY: [phone]    PHYSICAL THERAPY: [phone]   [place] SPECIALTY BILLING QUESTIONS: [phone] [address] FAX: [phone]   [place] [address]    (Thurs & Fri AM)         CALLUS / CORNS / IPKs  When there is excessive friction or pressure on the skin, the body responds by making the skin thicker to protect the deeper structures from becoming exposed. While this works well to protect the deeper structures, the thickened skin can increase pressure and pain.    CALLUS: Flat, diffuse thickening are simple calluses and they are usually caused by friction. Often these are the result of rubbing on a shoe or going barefoot.    CORNS: Calluses with a central core between the toes are called corns. These result from prominent joints on adjacent toes rubbing together. Theses are a symptom of bone malalignment and will always recur unless the underlying bones are addressed surgically.    IPKs: Calluses with a central core on the ball of the foot are usually IPKs (intractable plantar keratosis). These are caused by excessive pressure from the metatarsals, the bones that make up the ball of the foot. Often one of these bones is too long or too prominent.  Again, these will always recur unless the underlying bone issue is addressed. There is no cure for these. They will either go away by themselves, recur, or more could develop.    ROUTINE MAINTENANCE  1. File them down with a pumice stone or callus file a couple times a week.   2. An electric callus removing device. Amope Pedi Perfect Electronic Pedicure Foot File and Callus Remover can be a good option.   3. Lotion can be applied to soften the callus. A urea  based cream such as Kersal or Vanicream or thicker cream with shea butter are good options.  4. Toe spacers or toe covers can be used for corns, gel pads can be used for other lesions on the bottom of the foot.   If there is a surgical pathology noted, such as a prominent bone, often this needs to be addressed surgically to minimize recurrence. However, sometimes the lesion simply migrates to another spot after surgery, so it is not a guaranteed cure.     **If you come back to clinic for treatment, insurance does not cover it, and you would be billed. This charge could range from $100 - $227**     CAPSULITIS / METATARSALGIA  All joints in the body are surrounded by a capsule, or a covering of soft tissue and ligaments. The capsule holds bones together and secretes joint fluid to help lubricate the joint. If a joint capsule is exposed to excessive force, it can develop microscopic tears and become inflamed. This commonly occurs in the foot due to mild variation in anatomy. Hammertoes, bunions, irregular bone length, joint immobility, etc. can all lead to excessive force on the joint. Capsule injury can also occur due to repetitive stress from exercise, insufficient support from shoes, excessive bare foot walking and excessive weight.      Conservative treatments include ice, rest from the aggravating activity, weight loss, orthotic inserts, improving shoes and shoe modifications. You can purchase an over the counter felt metatarsal pad to place in your shoes at [facility] or on [facility]. Appropriate shoes will protect the inflamed tissue improving the chances of healing. Avoidance of standing or walking barefoot, including around the house, is necessary to allow healing. Casts are sometimes used for more aggressive protection.  NSAIDs such as Advil are also used to help with pain and decreasing inflammation. If pain continues over a period of weeks with continuous rest and icing, Corticosteroid injections can be a  "treatment option to try and help decrease inflammation.    Surgery is often necessary to correct the underlying structural problem. Surgery might include shortening an excessively long bone, repairing bunion or hammertoe, lengthening a tight Achilles  tendon, etc. These are same day surgeries that might be pursued if more conservative measures fail to provide relief.      The inflamed joint capsule has the potential to completely tear. This will allow the toe to drift off the ground, curving toward the other toes. The involved toe may under or overlap the adjacent toes as drift continues. The pain may improve after the joint tears or this new position will be permanent. Surgery can address the toe alignment. Your goal of treating capsulitis is to avoid this scenario.        ** You can find felt metatarsal pads to place in your shoes at our [facility], [employer], [employer], or on [employer]     Dr. Omalley likes the Hapad brand.    FOREFOOT PAIN RECOMMENDATIONS    1) Please consider stiffer/ rigid - soled shoes as much as possible. These take stress off of the ball of your foot. This might be short term, until pain resolves, or long term to keep pain controlled.    2) Avoid barefoot walking, walking in socks, flexible shoes, flip flops, shoes without arch support, etc.    3) It is a good idea to wear a shoe at all times, including when at home.    4) Consider purchasing a felt metatarsal pad.  A good brand is \"Hapad.\"  You can find these and others online.  Also, these can be built into custom/prescription arch supports.    5) Consider a quality over-the-counter arch support. These can be found at [employer].  Some of these have a metatarsal pad built in.   If Dr. Omalley prescribed custom orthoses, please consider this option.    6) Ice and anti inflammatories can be helpful, earlier on in the process.  Anti inflammatories are not good for you, if taken for a long period of time.     7) Gentle " calf stretching can take pressure off of the ball of your foot.    Calf/Achilles Stretching: Do the stretching gently. Do not bounce or stretch to the point of pain. Hold each stretch for 30 seconds. Stretch 10 times per set, three sets per day. Morning, afternoon and evening. If your heel pain is very severe in the morning, consider doing the first set of stretches before you get out of bed.               Hold each stretch for 30 seconds. Stretch 10 times per set, three sets per day. Morning, afternoon and evening. If your heel pain is very severe in the morning, consider doing the first set of stretches before you get out of bed.       [facility] LOCATIONS  [facility]  [address]  [place] [address]  Phone: [phone]  Fax: [phone] [facility]   [address]  [place] [address]  Phone: [phone]  Fax: [phone]   [facility]  [address]  [place] [address]  Phone: [phone]  Fax: [phone] [facility]  [address]  [place] [address]  Phone: [phone]   Fax: [phone]   * Please call any location listed to make an appointment for a casting/fitting. Your referral was sent to their central office and they will all have the order on file.

## 2025-03-26 NOTE — PROGRESS NOTES
ASSESSMENT:  Encounter Diagnoses   Name Primary?    Callus of foot Yes    Metatarsalgia, left foot     Pes planus of both feet      MEDICAL DECISION MAKING:  Although Sinan is having discomfort below the left third metatarsal head region, I could find no hyperkeratotic lesion to trim or debride today.  I asked him to feel and visualize the area.  He was agreeable.    I discussed that there are other structures that are affected by high forefoot pressure that can cause pain.  The relevant anatomy was discussed including skin, flexor tendons, joint capsule, and bone.    We reviewed other ways to help offload the forefoot to reduce pain:  Stiffer soled shoes  Cushioned insert with aperture  Felt metatarsal padding  Custom orthoses with metatarsal padding -the referral was placed  Avoidance of barefoot walking nonsupportive footwear  Ongoing filing when needed    I am happy to pare down any hyperkeratotic lesion in the future.    Follow-up on an as-needed basis.    Disclaimer: This note consists of symbols derived from keyboarding, dictation and/or voice recognition software. As a result, there may be errors in the script that have gone undetected. Please consider this when interpreting information found in this chart.    Tj Omalley, ROSSI, FACFAS, MS    [facility] Department of Podiatry/Foot & Ankle Surgery      ____________________________________________________________________    HPI:       Sinan Rodriguez presents today reporting painful corns on the left foot.  He is a bother for 5 years.  Throbbing pain when walking  He is try to file them at home.  He was last evaluated in podiatry by Dr. Calix who trimmed these lesions down.  A prescription for Lac-Hydrin was provided.  He specifies the region below the left third metatarsal head.  He has mainly paid out-of-pocket for the trimming of these lesions in the past.  *  Past Medical History:   Diagnosis Date    Cancer (H) 2/5/20    Hypertension     Hypothyroidism  9/3/2020   *  *  Past Surgical History:   Procedure Laterality Date    COLONOSCOPY N/A 8/23/2019    Procedure: COLONOSCOPY;  Surgeon: Silvano Segovia MD;  Location:  GI    KNEE SURGERY Bilateral     scope multiple - most recent 2014    ORTHOPEDIC SURGERY      STRIP VEIN BILATERAL Bilateral 2015   *  *  Current Outpatient Medications   Medication Sig Dispense Refill    amLODIPine (NORVASC) 10 MG tablet TAKE 1 TABLET DAILY 90 tablet 3    ammonium lactate (AMLACTIN) 12 % external cream Apply topically 2 times daily. 385 g 3    aspirin (ASPIRIN ADULT) 325 MG tablet daily       BD Microtainer Lancets MISC USE TO TEST 1 TIME DAILY OR AS DIRECTED 100 each 2    blood glucose (NO BRAND SPECIFIED) lancets standard Use to test blood sugar one times daily or as directed. 100 each 3    blood glucose (ONETOUCH VERIO IQ) test strip USE TO TEST BLOOD SUGAR ONCE DAILY OR AS DIRECTED 100 strip 3    Blood Glucose Monitoring Suppl (ONETOUCH VERIO REFLECT) w/Device KIT 1 Device daily 1 kit 0    Cabozantinib S-Malate (CABOMETYX) 60 MG Take 60 mg by mouth daily Every 3 days      cyclobenzaprine (FLEXERIL) 10 MG tablet Take 1 tablet (10 mg) by mouth 3 times daily as needed for muscle spasms. 90 tablet 1    cyclobenzaprine (FLEXERIL) 10 MG tablet TAKE 1 TABLET THREE TIMES A DAY AS NEEDED FOR MUSCLE SPASMS 90 tablet 0    EPINEPHrine (EPIPEN 2-CHERELLE) 0.3 MG/0.3ML injection 2-pack Inject 0.3 mLs (0.3 mg) into the muscle once as needed for anaphylaxis. 1 each 3    levothyroxine (SYNTHROID/LEVOTHROID) 75 MCG tablet Take 1 tablet (75 mcg) by mouth every morning (before breakfast). 90 tablet 0    losartan (COZAAR) 25 MG tablet TAKE 3 TABLETS DAILY 270 tablet 1    metFORMIN (GLUCOPHAGE XR) 500 MG 24 hr tablet Take 2 tablets (1,000 mg) by mouth daily (with dinner). 180 tablet 2    methylPREDNISolone (MEDROL DOSEPAK) 4 MG tablet therapy pack Follow Package Directions 21 tablet 1    Nivolumab (OPDIVO IV)       omeprazole (PRILOSEC) 20 MG   "capsule TAKE 1 CAPSULE DAILY 90 capsule 3    rosuvastatin (CRESTOR) 20 MG tablet TAKE 1 TABLET DAILY 90 tablet 3    zoledronic acid (ZOMETA) 4 MG/100ML SOLN Inject 100 mLs (4 mg) into the vein once for 1 dose 100 mL 0    zolpidem (AMBIEN) 10 MG tablet Take 1 tablet (10 mg) by mouth nightly as needed for sleep. 30 tablet 1         EXAM:    Vitals: Ht 1.905 m (6' 3\")   Wt 117.9 kg (260 lb)   BMI 32.50 kg/m    BMI: Body mass index is 32.5 kg/m .    Vasc:      Pedal pulses are palpable for the dorsalis pedis posterior tibial artery, bilateral foot.  Capillary fill time </= 3 seconds  Pedal skin appears well-perfused  Neuro:      Light touch sensation intact to all sensory nerve distributions, bilateral foot.  No apparent spastic contractures or other deformity secondary to neurologic compromise.  Derm:      There is an area below the left third metatarsal head that appears as if it has been filed down.  There is no palpable hyperkeratotic lesion found.  No wounds   No worrisome lesions  MSK:      Decrease in medial longitudinal arch with weightbearing.      "

## 2025-03-26 NOTE — LETTER
3/26/2025      Sinan Rodriguez  [address]  [place] [address]      Dear Colleague,    Thank you for referring your patient, Sinan Rodriguez, to the [facility]. Please see a copy of my visit note below.    ASSESSMENT:  Encounter Diagnoses   Name Primary?     Callus of foot Yes     Metatarsalgia, left foot      Pes planus of both feet      MEDICAL DECISION MAKING:  Although Sinan is having discomfort below the left third metatarsal head region, I could find no hyperkeratotic lesion to trim or debride today.  I asked him to feel and visualize the area.  He was agreeable.    I discussed that there are other structures that are affected by high forefoot pressure that can cause pain.  The relevant anatomy was discussed including skin, flexor tendons, joint capsule, and bone.    We reviewed other ways to help offload the forefoot to reduce pain:  Stiffer soled shoes  Cushioned insert with aperture  Felt metatarsal padding  Custom orthoses with metatarsal padding -the referral was placed  Avoidance of barefoot walking nonsupportive footwear  Ongoing filing when needed    I am happy to pare down any hyperkeratotic lesion in the future.    Follow-up on an as-needed basis.    Disclaimer: This note consists of symbols derived from keyboarding, dictation and/or voice recognition software. As a result, there may be errors in the script that have gone undetected. Please consider this when interpreting information found in this chart.    Tj Omalley DPM, FACFAS, [facility] Department of Podiatry/Foot & Ankle Surgery      ____________________________________________________________________    HPI:       Sinan Rodriguez presents today reporting painful corns on the left foot.  He is a bother for 5 years.  Throbbing pain when walking  He is try to file them at home.  He was last evaluated in podiatry by Dr. Calix who trimmed these lesions down.  A prescription for  Lac-Hydrin was provided.  He specifies the region below the left third metatarsal head.  He has mainly paid out-of-pocket for the trimming of these lesions in the past.  *  Past Medical History:   Diagnosis Date     Cancer (H) 2/5/20     Hypertension      Hypothyroidism 9/3/2020   *  *  Past Surgical History:   Procedure Laterality Date     COLONOSCOPY N/A 8/23/2019    Procedure: COLONOSCOPY;  Surgeon: Silvano Segovia MD;  Location:  GI     KNEE SURGERY Bilateral     scope multiple - most recent 2014     ORTHOPEDIC SURGERY       STRIP VEIN BILATERAL Bilateral 2015   *  *  Current Outpatient Medications   Medication Sig Dispense Refill     amLODIPine (NORVASC) 10 MG tablet TAKE 1 TABLET DAILY 90 tablet 3     ammonium lactate (AMLACTIN) 12 % external cream Apply topically 2 times daily. 385 g 3     aspirin (ASPIRIN ADULT) 325 MG tablet daily        BD Microtainer Lancets MISC USE TO TEST 1 TIME DAILY OR AS DIRECTED 100 each 2     blood glucose (NO BRAND SPECIFIED) lancets standard Use to test blood sugar one times daily or as directed. 100 each 3     blood glucose (ONETOUCH VERIO IQ) test strip USE TO TEST BLOOD SUGAR ONCE DAILY OR AS DIRECTED 100 strip 3     Blood Glucose Monitoring Suppl (ONETOUCH VERIO REFLECT) w/Device KIT 1 Device daily 1 kit 0     Cabozantinib S-Malate (CABOMETYX) 60 MG Take 60 mg by mouth daily Every 3 days       cyclobenzaprine (FLEXERIL) 10 MG tablet Take 1 tablet (10 mg) by mouth 3 times daily as needed for muscle spasms. 90 tablet 1     cyclobenzaprine (FLEXERIL) 10 MG tablet TAKE 1 TABLET THREE TIMES A DAY AS NEEDED FOR MUSCLE SPASMS 90 tablet 0     EPINEPHrine (EPIPEN 2-CHERELLE) 0.3 MG/0.3ML injection 2-pack Inject 0.3 mLs (0.3 mg) into the muscle once as needed for anaphylaxis. 1 each 3     levothyroxine (SYNTHROID/LEVOTHROID) 75 MCG tablet Take 1 tablet (75 mcg) by mouth every morning (before breakfast). 90 tablet 0     losartan (COZAAR) 25 MG tablet TAKE 3 TABLETS DAILY 270 tablet  "1     metFORMIN (GLUCOPHAGE XR) 500 MG 24 hr tablet Take 2 tablets (1,000 mg) by mouth daily (with dinner). 180 tablet 2     methylPREDNISolone (MEDROL DOSEPAK) 4 MG tablet therapy pack Follow Package Directions 21 tablet 1     Nivolumab (OPDIVO IV)        omeprazole (PRILOSEC) 20 MG DR capsule TAKE 1 CAPSULE DAILY 90 capsule 3     rosuvastatin (CRESTOR) 20 MG tablet TAKE 1 TABLET DAILY 90 tablet 3     zoledronic acid (ZOMETA) 4 MG/100ML SOLN Inject 100 mLs (4 mg) into the vein once for 1 dose 100 mL 0     zolpidem (AMBIEN) 10 MG tablet Take 1 tablet (10 mg) by mouth nightly as needed for sleep. 30 tablet 1         EXAM:    Vitals: Ht 1.905 m (6' 3\")   Wt 117.9 kg (260 lb)   BMI 32.50 kg/m    BMI: Body mass index is 32.5 kg/m .    Vasc:      Pedal pulses are palpable for the dorsalis pedis posterior tibial artery, bilateral foot.  Capillary fill time </= 3 seconds  Pedal skin appears well-perfused  Neuro:      Light touch sensation intact to all sensory nerve distributions, bilateral foot.  No apparent spastic contractures or other deformity secondary to neurologic compromise.  Derm:      There is an area below the left third metatarsal head that appears as if it has been filed down.  There is no palpable hyperkeratotic lesion found.  No wounds   No worrisome lesions  MSK:      Decrease in medial longitudinal arch with weightbearing.        Again, thank you for allowing me to participate in the care of your patient.        Sincerely,        Tj Omalley DPM    Electronically signed"

## 2025-04-04 ENCOUNTER — TRANSFERRED RECORDS (OUTPATIENT)
Dept: HEALTH INFORMATION MANAGEMENT | Facility: CLINIC | Age: 55
End: 2025-04-04
Payer: COMMERCIAL

## 2025-05-13 ENCOUNTER — MYC MEDICAL ADVICE (OUTPATIENT)
Dept: FAMILY MEDICINE | Facility: CLINIC | Age: 55
End: 2025-05-13
Payer: COMMERCIAL

## 2025-05-30 ENCOUNTER — TRANSFERRED RECORDS (OUTPATIENT)
Dept: HEALTH INFORMATION MANAGEMENT | Facility: CLINIC | Age: 55
End: 2025-05-30
Payer: COMMERCIAL

## 2025-06-05 DIAGNOSIS — E03.8 OTHER SPECIFIED HYPOTHYROIDISM: ICD-10-CM

## 2025-06-05 RX ORDER — LEVOTHYROXINE SODIUM 75 UG/1
75 TABLET ORAL
Qty: 90 TABLET | Refills: 2 | Status: SHIPPED | OUTPATIENT
Start: 2025-06-05

## 2025-06-27 ENCOUNTER — TRANSFERRED RECORDS (OUTPATIENT)
Dept: HEALTH INFORMATION MANAGEMENT | Facility: CLINIC | Age: 55
End: 2025-06-27
Payer: COMMERCIAL

## 2025-07-22 ENCOUNTER — OFFICE VISIT (OUTPATIENT)
Dept: PODIATRY | Facility: CLINIC | Age: 55
End: 2025-07-22
Payer: COMMERCIAL

## 2025-07-22 DIAGNOSIS — L60.0 INGROWING RIGHT GREAT TOENAIL: Primary | ICD-10-CM

## 2025-07-22 DIAGNOSIS — M79.674 GREAT TOE PAIN, RIGHT: ICD-10-CM

## 2025-07-22 DIAGNOSIS — M77.42 METATARSALGIA, LEFT FOOT: ICD-10-CM

## 2025-07-22 PROCEDURE — 11750 EXCISION NAIL&NAIL MATRIX: CPT | Mod: T5 | Performed by: PODIATRIST

## 2025-07-22 PROCEDURE — 99213 OFFICE O/P EST LOW 20 MIN: CPT | Mod: 25 | Performed by: PODIATRIST

## 2025-07-22 NOTE — PATIENT INSTRUCTIONS
Thank you for choosing [facility] / [facility]le Surgery!    DR. LOZA'S CLINIC LOCATIONS:     [facility] TRIAGE LINE: [phone]   [address] [place] [address] APPOINTMENTS: [phone]   [place] [address] RADIOLOGY: [phone]   (Every other Tues - Wed - Fri PM) SET UP SURGERY: [phone]    PHYSICAL THERAPY: [phone]   [place] [facility] BILLING QUESTIONS: [phone] [address] FAX: [phone]   [place] [address]    (Thurs & Fri AM)         DR. LOZA'S RECOMMENDATIONS FOR HEALING AFTER A NAIL REMOVAL PROCEDURE    1. Try to keep the bandage on until bedtime or the morning after your procedure.     2. Some bleeding is normal. If bleeding seems excessive to you, place ice on top of your foot for 15-20 minutes, elevate your foot above heart level and reinforce the dressing (add additional covering)    3. Over the counter pain medication (tylenol / ibuprofen), elevating your foot and cold application is all you should need for pain control. Pain is usually easily managed.      4. For 1-2 weeks, soak your foot twice a day in mild, skin friendly soap water solution for 15 minutes (dish soap, hand soap, body wash, etc). After soaking, blot the area dry and apply a regular band aid. An antibiotic ointment is not needed.  If the guaze dressing sticks to your toe, soak your foot for a few minutes with the dressing on. This should help loosen it.     6. It is normal to experience some discomfort and redness around the nail for several days following the procedure. Drainage will likely appear a red and/or yellow. This is normal. If your toe is still draining fluid after 2 weeks, continue soaking.    7. Initial discomfort might last for 2-3 days. You may resume with regular activity as soon as you want,  as long as you keep the wound clean, dry and follow the soaking instruction. It is recommended that you do not enter public swimming pools/hot tubs while your toe is  draining.    8. If you are experiencing worsening pain and redness or notice pus after 2-3 days please contact the clinic. Ask to speak with a triage nurse and they will inform our team of your symptoms and we can advise if a follow up is needed.      IF YOU HAD A PERMANENT NAIL REMOVAL PROCEDURE    - Healing will take longer and you might need to soak for 2-3 weeks. You are healing from the nail being removed and the chemical burn.  - Expect some drainage, crust  and redness. This is from the acid (phenol) that was applied and the chemical burn.  - After soaking, use a Q-tip to clean under and around the skin where the nail was removed. This helps get rid of the brownish material and helps the wound drain  - Sometimes it is hard to know if the redness and drainage is normal versus a developing infection. If in doubt, reach out to Dr. Omalley's office via My Chart or phone.   - If redness extends back to the middle of the toe, you should have it looked at in clinic.     After 2-3 days, if you are experiencing worsening pain and redness, or notice pus, please contact the clinic. Ask to speak with a triage nurse and they will inform our team of your symptoms and we can advise if a follow up is needed.     CAPSULITIS / METATARSALGIA  All joints in the body are surrounded by a capsule, or a covering of soft tissue and ligaments. The capsule holds bones together and secretes joint fluid to help lubricate the joint. If a joint capsule is exposed to excessive force, it can develop microscopic tears and become inflamed. This commonly occurs in the foot due to mild variation in anatomy. Hammertoes, bunions, irregular bone length, joint immobility, etc. can all lead to excessive force on the joint. Capsule injury can also occur due to repetitive stress from exercise, insufficient support from shoes, excessive bare foot walking and excessive weight.      Conservative treatments include ice, rest from the aggravating activity,  weight loss, orthotic inserts, improving shoes and shoe modifications. You can purchase an over the counter felt metatarsal pad to place in your shoes at [facility] or on [employer]. Appropriate shoes will protect the inflamed tissue improving the chances of healing. Avoidance of standing or walking barefoot, including around the house, is necessary to allow healing. Casts are sometimes used for more aggressive protection.  NSAIDs such as Advil are also used to help with pain and decreasing inflammation. If pain continues over a period of weeks with continuous rest and icing, Corticosteroid injections can be a treatment option to try and help decrease inflammation.    Surgery is often necessary to correct the underlying structural problem. Surgery might include shortening an excessively long bone, repairing bunion or hammertoe, lengthening a tight Achilles  tendon, etc. These are same day surgeries that might be pursued if more conservative measures fail to provide relief.      The inflamed joint capsule has the potential to completely tear. This will allow the toe to drift off the ground, curving toward the other toes. The involved toe may under or overlap the adjacent toes as drift continues. The pain may improve after the joint tears or this new position will be permanent. Surgery can address the toe alignment. Your goal of treating capsulitis is to avoid this scenario.        ** You can find felt metatarsal pads to place in your shoes at our [facility], [facility], [facility], or on [employer]     Dr. Omalley likes the [employer] brand.    FOREFOOT PAIN RECOMMENDATIONS    1) Please consider stiffer/ rigid - soled shoes as much as possible. These take stress off of the ball of your foot. This might be short term, until pain resolves, or long term to keep pain controlled.    2) Avoid barefoot walking, walking in socks, flexible shoes, flip flops, shoes without arch support, etc.    3) It is a good idea to  "wear a shoe at all times, including when at home.    4) Consider purchasing a felt metatarsal pad.  A good brand is \"[employer].\"  You can find these and others online.  Also, these can be built into custom/prescription arch supports.    5) Consider a quality over-the-counter arch support. These can be found at [employer].  Some of these have a metatarsal pad built in.   If Dr. Omalley prescribed custom orthoses, please consider this option.    6) Ice and anti inflammatories can be helpful, earlier on in the process.  Anti inflammatories are not good for you, if taken for a long period of time.     7) Gentle calf stretching can take pressure off of the ball of your foot.    Calf/Achilles Stretching: Do the stretching gently. Do not bounce or stretch to the point of pain. Hold each stretch for 30 seconds. Stretch 10 times per set, three sets per day. Morning, afternoon and evening. If your heel pain is very severe in the morning, consider doing the first set of stretches before you get out of bed.               Hold each stretch for 30 seconds. Stretch 10 times per set, three sets per day. Morning, afternoon and evening. If your heel pain is very severe in the morning, consider doing the first set of stretches before you get out of bed.      "

## 2025-07-22 NOTE — PROGRESS NOTES
"ASSESSMENT:  Encounter Diagnoses   Name Primary?    Ingrowing right great toenail Yes    Great toe pain, right     Metatarsalgia, left foot      MEDICAL DECISION MAKING:  We again reviewed the potential causes of pain involving his left plantar forefoot.  It certainly might be related to the callus below the third metatarsal head, yet I discussed the relevant anatomy and other structures, notably the joint capsule, that can cause pain.  Efforts to treat are focused on offloading.  We reviewed the similar recommendations made at his last visit on 3/26/2025.  He is considering a trial of a fiber carbon insert to convert his shoe to a stiffer shoe.  I think this is reasonable.  Ongoing use of the metatarsal pad.  Good arch support  Avoidance of barefoot walking  Gentle calf stretching  Cushioned insert with or without aperture    Given his nail related pain on the right great toe, I think his request is reasonable.  He would like to proceed with a total chemical matrixectomy, right great toenail.    Chemical Matrixectomy/ Permanent Nail Removal:    The chemical matrixectomy procedure was reviewed, including the risks, benefits, and post-procedure cares.  The risk of discomfort and infection was discussed.  The chance of nail regrowth was discussed.  Verbal and written consent was obtained.  The site was marked and the \"Time Out\" called.      The base of the right great was injected with 3 cc of  2% Lidocaine plain.  The toe was then prepped with betadine solution.  A tourniquet was applied around the base of the toe to for hemostasis.   Next the toe was checked for adequate anesthesia.  The nail was freed from the nail bed and marginal soft tissue attachments with a small spatula. The nail was firmly grasped with a hemostat and removed in total.      Using small applicator sticks, three 30 second applications of phenol to the unveiled germinal matrix was performed.  The area was irrigated/diluted with isopropyl " "alcohol.  It was blotted dry.    Next the tourniquet was removed. Bacitracin ointment was applied to the nail bed, followed by a compressive dressing.  Sinan Rodriguez tolerated the procedure well. Post-procedure instructions were reviewed and also outlined in the provided after visit summary.         Disclaimer: This note consists of symbols derived from keyboarding, dictation and/or voice recognition software. As a result, there may be errors in the script that have gone undetected. Please consider this when interpreting information found in this chart.    Tj Omalley DPM, FACFAS, MS    [facility] & Ankle Surgery      ____________________________________________________________________    HPI:       Sinan Rodriguez presents today with 2 concerns.  His primary concern is pain related to his right great toe.  There is a periungual hard bump that causes significant pain.  He reports that this toenail is \"dead.  \"  History of having the left great toenail permanently removed with good results.  He is interested in having this done on the right.    He also continues to have left forefoot pain which he relates to a callus.  Pain has moved around in the forefoot.  I treated him for this on 3/26/2025.  Recommendations were made for offloading the forefoot.  He did not find the custom orthoses helpful.  He has used the felt metatarsal pad.  *  Past Medical History:   Diagnosis Date    Cancer (H) 2/5/20    Hypertension     Hypothyroidism 9/3/2020   *  *  Past Surgical History:   Procedure Laterality Date    COLONOSCOPY N/A 8/23/2019    Procedure: COLONOSCOPY;  Surgeon: Silvano Segovia MD;  Location:  GI    KNEE SURGERY Bilateral     scope multiple - most recent 2014    ORTHOPEDIC SURGERY      STRIP VEIN BILATERAL Bilateral 2015   *  *  Current Outpatient Medications   Medication Sig Dispense Refill    amLODIPine (NORVASC) 10 MG tablet TAKE 1 TABLET DAILY 90 tablet 3    ammonium lactate " (AMLACTIN) 12 % external cream Apply topically 2 times daily. 385 g 3    aspirin (ASPIRIN ADULT) 325 MG tablet daily       BD Microtainer Lancets MISC USE TO TEST 1 TIME DAILY OR AS DIRECTED 100 each 2    blood glucose (NO BRAND SPECIFIED) lancets standard Use to test blood sugar one times daily or as directed. 100 each 3    blood glucose (ONETOUCH VERIO IQ) test strip USE TO TEST BLOOD SUGAR ONCE DAILY OR AS DIRECTED 100 strip 3    Blood Glucose Monitoring Suppl (ONETOUCH VERIO REFLECT) w/Device KIT 1 Device daily 1 kit 0    Cabozantinib S-Malate (CABOMETYX) 60 MG Take 60 mg by mouth daily Every 3 days      cyclobenzaprine (FLEXERIL) 10 MG tablet Take 1 tablet (10 mg) by mouth 3 times daily as needed for muscle spasms. 90 tablet 1    cyclobenzaprine (FLEXERIL) 10 MG tablet TAKE 1 TABLET THREE TIMES A DAY AS NEEDED FOR MUSCLE SPASMS 90 tablet 0    EPINEPHrine (EPIPEN 2-CHERELLE) 0.3 MG/0.3ML injection 2-pack Inject 0.3 mLs (0.3 mg) into the muscle once as needed for anaphylaxis. 1 each 3    levothyroxine (SYNTHROID/LEVOTHROID) 75 MCG tablet Take 1 tablet (75 mcg) by mouth every morning (before breakfast). 90 tablet 2    losartan (COZAAR) 25 MG tablet TAKE 3 TABLETS DAILY 270 tablet 1    metFORMIN (GLUCOPHAGE XR) 500 MG 24 hr tablet Take 2 tablets (1,000 mg) by mouth daily (with dinner). 180 tablet 2    methylPREDNISolone (MEDROL DOSEPAK) 4 MG tablet therapy pack Follow Package Directions 21 tablet 1    Nivolumab (OPDIVO IV)       omeprazole (PRILOSEC) 20 MG DR capsule TAKE 1 CAPSULE DAILY 90 capsule 3    rosuvastatin (CRESTOR) 20 MG tablet TAKE 1 TABLET DAILY 90 tablet 3    zoledronic acid (ZOMETA) 4 MG/100ML SOLN Inject 100 mLs (4 mg) into the vein once for 1 dose 100 mL 0    zolpidem (AMBIEN) 10 MG tablet Take 1 tablet (10 mg) by mouth nightly as needed for sleep. 30 tablet 1         EXAM:    Vitals: There were no vitals taken for this visit.  BMI: There is no height or weight on file to calculate BMI.    Vasc:       Pedal pulses are palpable for the dorsalis pedis posterior tibial artery, bilateral foot.  Capillary fill time </= 3 seconds  Pedal skin appears well-perfused  Neuro:      Light touch sensation intact to all sensory nerve distributions, bilateral foot.  No apparent spastic contractures or other deformity secondary to neurologic compromise.  Derm:      The right hallux nail is thickened discolored.  There appears to be nail plate deformity at its most proximal lateral aspect.  There is thickening of the nail which causes irritation to the overlying skin.  No erythema, edema or wounds.  Hyperkeratotic skin below the left third metatarsal head region.  MSK:      pes planus bilateral

## 2025-07-22 NOTE — LETTER
"7/22/2025      Sinan Rodriguez  [address]  [place] [address]      Dear Colleague,    Thank you for referring your patient, Sinan Rodriguez, to the [facility]. Please see a copy of my visit note below.    ASSESSMENT:  Encounter Diagnoses   Name Primary?     Ingrowing right great toenail Yes     Great toe pain, right      Metatarsalgia, left foot      MEDICAL DECISION MAKING:  We again reviewed the potential causes of pain involving his left plantar forefoot.  It certainly might be related to the callus below the third metatarsal head, yet I discussed the relevant anatomy and other structures, notably the joint capsule, that can cause pain.  Efforts to treat are focused on offloading.  We reviewed the similar recommendations made at his last visit on 3/26/2025.  He is considering a trial of a fiber carbon insert to convert his shoe to a stiffer shoe.  I think this is reasonable.  Ongoing use of the metatarsal pad.  Good arch support  Avoidance of barefoot walking  Gentle calf stretching  Cushioned insert with or without aperture    Given his nail related pain on the right great toe, I think his request is reasonable.  He would like to proceed with a total chemical matrixectomy, right great toenail.    Chemical Matrixectomy/ Permanent Nail Removal:    The chemical matrixectomy procedure was reviewed, including the risks, benefits, and post-procedure cares.  The risk of discomfort and infection was discussed.  The chance of nail regrowth was discussed.  Verbal and written consent was obtained.  The site was marked and the \"Time Out\" called.      The base of the right great was injected with 3 cc of  2% Lidocaine plain.  The toe was then prepped with betadine solution.  A tourniquet was applied around the base of the toe to for hemostasis.   Next the toe was checked for adequate anesthesia.  The nail was freed from the nail bed and marginal soft tissue attachments with " "a small spatula. The nail was firmly grasped with a hemostat and removed in total.      Using small applicator sticks, three 30 second applications of phenol to the unveiled germinal matrix was performed.  The area was irrigated/diluted with isopropyl alcohol.  It was blotted dry.    Next the tourniquet was removed. Bacitracin ointment was applied to the nail bed, followed by a compressive dressing.  Sinan Rodriguez tolerated the procedure well. Post-procedure instructions were reviewed and also outlined in the provided after visit summary.         Disclaimer: This note consists of symbols derived from keyboarding, dictation and/or voice recognition software. As a result, there may be errors in the script that have gone undetected. Please consider this when interpreting information found in this chart.    Tj Omalley DPM, FACBRANDAN, MS    [facility] Podiatry/Foot & Ankle Surgery      ____________________________________________________________________    HPI:       Sinan Rodriguez presents today with 2 concerns.  His primary concern is pain related to his right great toe.  There is a periungual hard bump that causes significant pain.  He reports that this toenail is \"dead.  \"  History of having the left great toenail permanently removed with good results.  He is interested in having this done on the right.    He also continues to have left forefoot pain which he relates to a callus.  Pain has moved around in the forefoot.  I treated him for this on 3/26/2025.  Recommendations were made for offloading the forefoot.  He did not find the custom orthoses helpful.  He has used the felt metatarsal pad.  *  Past Medical History:   Diagnosis Date     Cancer (H) 2/5/20     Hypertension      Hypothyroidism 9/3/2020   *  *  Past Surgical History:   Procedure Laterality Date     COLONOSCOPY N/A 8/23/2019    Procedure: COLONOSCOPY;  Surgeon: Silvano Segovia MD;  Location:  GI     KNEE SURGERY Bilateral     " scope multiple - most recent 2014     ORTHOPEDIC SURGERY       STRIP VEIN BILATERAL Bilateral 2015   *  *  Current Outpatient Medications   Medication Sig Dispense Refill     amLODIPine (NORVASC) 10 MG tablet TAKE 1 TABLET DAILY 90 tablet 3     ammonium lactate (AMLACTIN) 12 % external cream Apply topically 2 times daily. 385 g 3     aspirin (ASPIRIN ADULT) 325 MG tablet daily        BD Microtainer Lancets MISC USE TO TEST 1 TIME DAILY OR AS DIRECTED 100 each 2     blood glucose (NO BRAND SPECIFIED) lancets standard Use to test blood sugar one times daily or as directed. 100 each 3     blood glucose (ONETOUCH VERIO IQ) test strip USE TO TEST BLOOD SUGAR ONCE DAILY OR AS DIRECTED 100 strip 3     Blood Glucose Monitoring Suppl (ONETOUCH VERIO REFLECT) w/Device KIT 1 Device daily 1 kit 0     Cabozantinib S-Malate (CABOMETYX) 60 MG Take 60 mg by mouth daily Every 3 days       cyclobenzaprine (FLEXERIL) 10 MG tablet Take 1 tablet (10 mg) by mouth 3 times daily as needed for muscle spasms. 90 tablet 1     cyclobenzaprine (FLEXERIL) 10 MG tablet TAKE 1 TABLET THREE TIMES A DAY AS NEEDED FOR MUSCLE SPASMS 90 tablet 0     EPINEPHrine (EPIPEN 2-CHERELLE) 0.3 MG/0.3ML injection 2-pack Inject 0.3 mLs (0.3 mg) into the muscle once as needed for anaphylaxis. 1 each 3     levothyroxine (SYNTHROID/LEVOTHROID) 75 MCG tablet Take 1 tablet (75 mcg) by mouth every morning (before breakfast). 90 tablet 2     losartan (COZAAR) 25 MG tablet TAKE 3 TABLETS DAILY 270 tablet 1     metFORMIN (GLUCOPHAGE XR) 500 MG 24 hr tablet Take 2 tablets (1,000 mg) by mouth daily (with dinner). 180 tablet 2     methylPREDNISolone (MEDROL DOSEPAK) 4 MG tablet therapy pack Follow Package Directions 21 tablet 1     Nivolumab (OPDIVO IV)        omeprazole (PRILOSEC) 20 MG DR capsule TAKE 1 CAPSULE DAILY 90 capsule 3     rosuvastatin (CRESTOR) 20 MG tablet TAKE 1 TABLET DAILY 90 tablet 3     zoledronic acid (ZOMETA) 4 MG/100ML SOLN Inject 100 mLs (4 mg) into the  vein once for 1 dose 100 mL 0     zolpidem (AMBIEN) 10 MG tablet Take 1 tablet (10 mg) by mouth nightly as needed for sleep. 30 tablet 1         EXAM:    Vitals: There were no vitals taken for this visit.  BMI: There is no height or weight on file to calculate BMI.    Vasc:      Pedal pulses are palpable for the dorsalis pedis posterior tibial artery, bilateral foot.  Capillary fill time </= 3 seconds  Pedal skin appears well-perfused  Neuro:      Light touch sensation intact to all sensory nerve distributions, bilateral foot.  No apparent spastic contractures or other deformity secondary to neurologic compromise.  Derm:      The right hallux nail is thickened discolored.  There appears to be nail plate deformity at its most proximal lateral aspect.  There is thickening of the nail which causes irritation to the overlying skin.  No erythema, edema or wounds.  Hyperkeratotic skin below the left third metatarsal head region.  MSK:      pes planus bilateral    Again, thank you for allowing me to participate in the care of your patient.        Sincerely,        Tj Omalley DPM    Electronically signed

## 2025-07-28 ENCOUNTER — MYC MEDICAL ADVICE (OUTPATIENT)
Dept: FAMILY MEDICINE | Facility: CLINIC | Age: 55
End: 2025-07-28
Payer: COMMERCIAL

## 2025-07-28 DIAGNOSIS — R73.03 PREDIABETES: ICD-10-CM

## 2025-07-29 RX ORDER — METFORMIN HYDROCHLORIDE 500 MG/1
1500 TABLET, EXTENDED RELEASE ORAL
Qty: 270 TABLET | Refills: 2 | Status: SHIPPED | OUTPATIENT
Start: 2025-07-29

## 2025-07-31 ENCOUNTER — OFFICE VISIT (OUTPATIENT)
Dept: FAMILY MEDICINE | Facility: CLINIC | Age: 55
End: 2025-07-31
Payer: COMMERCIAL

## 2025-07-31 VITALS
OXYGEN SATURATION: 96 % | SYSTOLIC BLOOD PRESSURE: 121 MMHG | DIASTOLIC BLOOD PRESSURE: 85 MMHG | TEMPERATURE: 97.3 F | WEIGHT: 261.2 LBS | BODY MASS INDEX: 32.48 KG/M2 | HEART RATE: 89 BPM | RESPIRATION RATE: 16 BRPM | HEIGHT: 75 IN

## 2025-07-31 DIAGNOSIS — Z71.84 TRAVEL ADVICE ENCOUNTER: Primary | ICD-10-CM

## 2025-07-31 DIAGNOSIS — C64.2 RENAL CELL CARCINOMA OF LEFT KIDNEY (H): Primary | ICD-10-CM

## 2025-07-31 DIAGNOSIS — Z29.9 ENCOUNTER FOR PREVENTIVE MEASURE: ICD-10-CM

## 2025-07-31 PROCEDURE — 90472 IMMUNIZATION ADMIN EACH ADD: CPT | Performed by: NURSE PRACTITIONER

## 2025-07-31 PROCEDURE — 90715 TDAP VACCINE 7 YRS/> IM: CPT | Performed by: NURSE PRACTITIONER

## 2025-07-31 PROCEDURE — 3074F SYST BP LT 130 MM HG: CPT | Performed by: NURSE PRACTITIONER

## 2025-07-31 PROCEDURE — 90619 MENACWY-TT VACCINE IM: CPT | Performed by: NURSE PRACTITIONER

## 2025-07-31 PROCEDURE — 1125F AMNT PAIN NOTED PAIN PRSNT: CPT | Performed by: NURSE PRACTITIONER

## 2025-07-31 PROCEDURE — 90691 TYPHOID VACCINE IM: CPT | Performed by: NURSE PRACTITIONER

## 2025-07-31 PROCEDURE — 3079F DIAST BP 80-89 MM HG: CPT | Performed by: NURSE PRACTITIONER

## 2025-07-31 PROCEDURE — 99402 PREV MED CNSL INDIV APPRX 30: CPT | Mod: 25 | Performed by: NURSE PRACTITIONER

## 2025-07-31 PROCEDURE — 90471 IMMUNIZATION ADMIN: CPT | Performed by: NURSE PRACTITIONER

## 2025-07-31 RX ORDER — AZITHROMYCIN 500 MG/1
500 TABLET, FILM COATED ORAL DAILY
Qty: 3 TABLET | Refills: 0 | Status: SHIPPED | OUTPATIENT
Start: 2025-07-31 | End: 2025-08-03

## 2025-07-31 RX ORDER — ATOVAQUONE AND PROGUANIL HYDROCHLORIDE 250; 100 MG/1; MG/1
1 TABLET, FILM COATED ORAL DAILY
Qty: 26 TABLET | Refills: 0 | Status: SHIPPED | OUTPATIENT
Start: 2025-07-31

## 2025-07-31 ASSESSMENT — PAIN SCALES - GENERAL: PAINLEVEL_OUTOF10: MILD PAIN (3)

## 2025-07-31 NOTE — NURSING NOTE
Patient received TDAP vaccine, MenQuadfi vaccine, and typhoid vaccine per BENNIE Crandall CNP's orders. Patient given VIS form(s) prior to immunization administration.   Reviewed patient's allergies, temperature is WNL.   Prior to immunization administration, this RN provided patient with Patient Responsibility Waiver form. Patient reviewed form(s), verbalized understanding, and signed/completed form requesting vaccination this visit. [facility].   Prior to immunization administration, this RN verified patient's identity by having patient verbalize first and last name and date of birth.   Patient was instructed to remain in clinic for 15 minutes afterwards and to report any adverse reactions.     - SCOTT HoodN, RN

## 2025-07-31 NOTE — LETTER
July 31, 2025      Name: Sinan Rodriguez  YOB: 1970  Dates of travel: 10/22/2025-11/07/2025  Countries of travel: [place]         Immunization Exemption Letter: Yellow Fever  The traveler named above is my patient and under my medical care and must be exempted from the requirement for yellow fever immunization. This exemption is valid only for the current trip (noted above).       Sincerely,          BENNIE Izquierdo CNP  7/31/2025 3:31 PM

## 2025-07-31 NOTE — PROGRESS NOTES
"Nurse Note ( Pre-Travel Consult)    Itinerary:  [place]    Departure Date: 10/22    Return Date: 11/07    Length of Trip 2 weeks    Reason for Travel: Tourism         Urban or rural: both    Accommodations: Hotel        IMMUNIZATION HISTORY  Have you received any immunizations within the past 4 weeks?  No  Have you ever fainted from having your blood drawn or from an injection?  No  Have you ever had a fever reaction to vaccination?  No  Have you ever had any bad reaction or side effect from any vaccination?  No  Do you live (or work closely) with anyone who has AIDS, an AIDS-like condition, any other immune disorder or who is on chemotherapy for cancer?  No  Do you have a family history of immunodeficiency?  No  Have you received any injection of immune globulin or any blood products during the past 12 months?  No    Patient roomed by Steve Pacheco  Sinan Rodriguez is a 54 year old male seen today alone for counsultation for international travel.   Patient will be departing in  3 month(s) and  traveling with organized tour group  And a girlfriend .      Patient itinerary :  will be in the [facility] > [employer] > [facility] [employer] [place] [facility]  region of *** which risk for { Diseases:928761}. exposure.      Patient's activities will include { Activities:256029}.    Patient's country of birth is { Country of birth:[place]}    Special medical concerns: {985191:458404}  Pre-travel questionnaire was completed by patient and reviewed by provider.     Vitals: /85   Pulse 89   Temp 97.3  F (36.3  C) (Temporal)   Resp 16   Ht 1.905 m (6' 3\")   Wt 118.5 kg (261 lb 3.2 oz)   SpO2 96%   BMI 32.65 kg/m    BMI= Body mass index is 32.65 kg/m .    EXAM:  General:  Well-nourished, well-developed in no acute distress.  Appears to be stated age, interacts appropriately and expresses understanding of information given to patient.    Current Outpatient Medications   Medication Sig Dispense Refill    " amLODIPine (NORVASC) 10 MG tablet TAKE 1 TABLET DAILY 90 tablet 3    ammonium lactate (AMLACTIN) 12 % external cream Apply topically 2 times daily. 385 g 3    aspirin (ASPIRIN ADULT) 325 MG tablet daily       BD Microtainer Lancets MISC USE TO TEST 1 TIME DAILY OR AS DIRECTED 100 each 2    blood glucose (NO BRAND SPECIFIED) lancets standard Use to test blood sugar one times daily or as directed. 100 each 3    blood glucose (ONETOUCH VERIO IQ) test strip USE TO TEST BLOOD SUGAR ONCE DAILY OR AS DIRECTED 100 strip 3    Blood Glucose Monitoring Suppl (ONETOUCH VERIO REFLECT) w/Device KIT 1 Device daily 1 kit 0    Cabozantinib S-Malate (CABOMETYX) 60 MG Take 60 mg by mouth daily Every 3 days      cyclobenzaprine (FLEXERIL) 10 MG tablet Take 1 tablet (10 mg) by mouth 3 times daily as needed for muscle spasms. 90 tablet 1    cyclobenzaprine (FLEXERIL) 10 MG tablet TAKE 1 TABLET THREE TIMES A DAY AS NEEDED FOR MUSCLE SPASMS 90 tablet 0    EPINEPHrine (EPIPEN 2-CHERELLE) 0.3 MG/0.3ML injection 2-pack Inject 0.3 mLs (0.3 mg) into the muscle once as needed for anaphylaxis. 1 each 3    levothyroxine (SYNTHROID/LEVOTHROID) 75 MCG tablet Take 1 tablet (75 mcg) by mouth every morning (before breakfast). 90 tablet 2    losartan (COZAAR) 25 MG tablet TAKE 3 TABLETS DAILY 270 tablet 1    metFORMIN (GLUCOPHAGE XR) 500 MG 24 hr tablet Take 3 tablets (1,500 mg) by mouth daily (with dinner). 270 tablet 2    methylPREDNISolone (MEDROL DOSEPAK) 4 MG tablet therapy pack Follow Package Directions 21 tablet 1    Nivolumab (OPDIVO IV)       omeprazole (PRILOSEC) 20 MG DR capsule TAKE 1 CAPSULE DAILY 90 capsule 3    rosuvastatin (CRESTOR) 20 MG tablet TAKE 1 TABLET DAILY 90 tablet 3    zoledronic acid (ZOMETA) 4 MG/100ML SOLN Inject 100 mLs (4 mg) into the vein once for 1 dose 100 mL 0    zolpidem (AMBIEN) 10 MG tablet Take 1 tablet (10 mg) by mouth nightly as needed for sleep. 30 tablet 1     Patient Active Problem List   Diagnosis     Chondromalacia of patella    Knee pain    Low back pain    Essential hypertension    Clot    Mixed hyperlipidemia    Gastroesophageal reflux disease without esophagitis    Other insomnia    Lumbar herniated disc    Ineffective esophageal motility    Obesity, unspecified    Prediabetes    Cholelithiasis    Varicose veins of other specified sites    Metastatic renal cell carcinoma to bone (H)    Hypothyroidism due to medication    Elevated glucose    Abnormal results of liver function studies    Anxiety    Atypical nevi    Embolism and thrombosis of superficial veins of right lower extremity    Hypothyroidism    Malignant neoplasm metastatic to lung (H)    Multiple pulmonary nodules    Renal cell carcinoma of left kidney (H)    Renal mass    Sexual dysfunction     Allergies   Allergen Reactions    Atorvastatin Muscle Pain (Myalgia)     Mildly elevated CK    With mildly elevated CK   Mildly elevated CK    With mildly elevated CK    Erythromycin      No reaction to eye drops recently    Other Reaction(s): *Unknown - Childhood Rxn, Other (see comments)    Iodine      injectable    Father allergice    Phenobarbital      Other Reaction(s): Agitation, Other (see comments)         Immunizations discussed include:   Covid 19: { IMMUNIZATIONS:548597}  Hepatitis A:  { IMMUNIZATIONS:061005}  Hepatitis B: { IMMUNIZATIONS:551586}  Influenza: { IMMUNIZATIONS:428724}  Typhoid: { IMMUNIZATIONS:793958}  Rabies: { IMMUNIZATIONS:153130}  Yellow Fever: { Yellow Fever:390243}  Venezuelan Encephalitis: { Venezuelan Encephalitis:053590}  Meningococcus: { IMMUNIZATIONS:296135}  Tetanus/Diphtheria: { IMMUNIZATIONS:797044}  Measles/Mumps/Rubella: { MMR:963948}  Cholera: { Cholera:365807}  Polio: { IMMUNIZATIONS:376874}  Pneumococcal: { Pneumococcal:276268}  Varicella: { IMMUNIZATIONS:007309}  Shingrix: { IMMUNIZATIONS: 640107}  HPV:  { IMMUNIZATIONS:741282}   Ch[place]unguya: { IMMUNIZATIONS:936771}   Mpox:  {  IMMUNIZATIONS:784018}     TB: ***    Altitude Exposure on this trip: ***  Past tolerance to Altitude: ***    ASSESSMENT/PLAN:  {Diag Picklist:496786}  I have reviewed general recommendations for safe travel   including: food/water precautions, insect precautions, safer sex   practices given high prevalence of Zika, HIV, MPox and other STDs,   roadway safety. Educational materials and [employer] report provided.    [place] prophylaxis recommended: {PTMALARIAPX:305436}  Symptomatic treatment for traveler's diarrhea: {PTTRAVELDIARRHEA:715200:x}  Altitude illness prevention and treatment: ***      Evacuation insurance advised and resources were provided to patient.    Total visit time {MINUTES:651172} minutes  with over 50% of time spent counseling patient and shared decision making as detailed above.    Rachle Luz CNP  Certificate in Travel Health

## 2025-07-31 NOTE — PATIENT INSTRUCTIONS
Thank you for visiting the [facility] : [phone]  Today July 31, 2025 you received the    Tetanus (Tdap) Vaccine    Meningococcal (Menactra) Vaccine    Typhoid - injectable. This vaccine is valid for two years.       Covid and Flu shot in the Fall    Start antimalarial on 10/21/2025 and take every 24 hours     Follow up vaccine appointments can be made as a NURSE ONLY visit at the Travel Clinic, (BE PREPARED TO WAIT, ) or at designated [facility].    If you are receiving the Rabies vaccines series, it is important that you follow the exact schedule ordered.     Pre-travel     We recommend that you purchase Medical Evacuation Insurance prior to your departure.  Https://wwwnc.cdc.gov/travel/page/insurance    [place] your travel plans with the [employer] Department of State through STEP ( Smart Traveler Enrollment Program ) https://step.[employer].gov.  STEP is a free service to allow [place] citizens and nationals traveling and living abroad to enroll their trip with the nearest [place] Embassy or Consulate.    Animal Exposure: Avoid all mammals even if they look healthy.  If there is a bite, scratch or even a lick, wash area immediately with soap and water for 15 minutes and seek medical care within 24 hours for evaluation of Rabies post exposure treatment.  Contact your Medical Evacuation Insurance.    Repiratory illness prevention strategies ( Covid and Influenza ) CDC recommendations:  Consider wearing a mask in crowded or poorly ventilated indoor areas, including on public transportation and in transportation hubs.  Hand washing: frequent, thorough handwashing with soap and water for 20 seconds. Use an alcohol-based hand  with at least 60% alcohol if soap and water are not readily available. Avoid touching face, nose, eyes, mouth unless you have done appropriate hand washing as above.  VACCINES: Staying up to date on COVID-19 vaccines significantly lowers the risk of  getting very sick, being hospitalized, or dying from COVID-19. CDC recommends that everyone stay up to date on their COVID-19 vaccines, especially people with weakened immune systems.    Travel Covid 19 Testing:  updated 12/06/2021  International travelers: Pre-travel:  See country specific Embassy websites or airline websites.    Post travel: CDC recommends getting tested 3-5 days after your trip     Post-travel illness:  Contact your provider or [facility] if you develop a fever, rash, cough, diarrhea or other symptoms for up to 1 year after travel.  Inform your healthcare provider when and where you traveled to.    Please call the [facility] with any questions [phone]  Or send your provider a 'My Chart' note.

## 2025-08-16 ENCOUNTER — MYC MEDICAL ADVICE (OUTPATIENT)
Dept: PODIATRY | Facility: CLINIC | Age: 55
End: 2025-08-16
Payer: COMMERCIAL

## 2025-08-21 ENCOUNTER — ANCILLARY PROCEDURE (OUTPATIENT)
Dept: GENERAL RADIOLOGY | Facility: CLINIC | Age: 55
End: 2025-08-21
Attending: PODIATRIST
Payer: COMMERCIAL

## 2025-08-21 ENCOUNTER — OFFICE VISIT (OUTPATIENT)
Dept: PODIATRY | Facility: CLINIC | Age: 55
End: 2025-08-21
Payer: COMMERCIAL

## 2025-08-21 DIAGNOSIS — M21.41 PES PLANUS OF BOTH FEET: ICD-10-CM

## 2025-08-21 DIAGNOSIS — M77.42 METATARSALGIA, LEFT FOOT: Primary | ICD-10-CM

## 2025-08-21 DIAGNOSIS — L03.031 CELLULITIS OF GREAT TOE, RIGHT: ICD-10-CM

## 2025-08-21 DIAGNOSIS — Z09 SURGERY FOLLOW-UP EXAMINATION: ICD-10-CM

## 2025-08-21 DIAGNOSIS — M21.42 PES PLANUS OF BOTH FEET: ICD-10-CM

## 2025-09-03 ENCOUNTER — TELEPHONE (OUTPATIENT)
Dept: FAMILY MEDICINE | Facility: CLINIC | Age: 55
End: 2025-09-03

## (undated) RX ORDER — FENTANYL CITRATE 50 UG/ML
INJECTION, SOLUTION INTRAMUSCULAR; INTRAVENOUS
Status: DISPENSED
Start: 2019-08-23